# Patient Record
Sex: FEMALE | Race: WHITE | NOT HISPANIC OR LATINO | Employment: PART TIME | URBAN - METROPOLITAN AREA
[De-identification: names, ages, dates, MRNs, and addresses within clinical notes are randomized per-mention and may not be internally consistent; named-entity substitution may affect disease eponyms.]

---

## 2017-11-20 ENCOUNTER — TRANSFERRED RECORDS (OUTPATIENT)
Dept: HEALTH INFORMATION MANAGEMENT | Facility: CLINIC | Age: 67
End: 2017-11-20

## 2017-11-29 ASSESSMENT — ENCOUNTER SYMPTOMS
EYE PAIN: 0
LEG PAIN: 0
ORTHOPNEA: 0
EYE REDNESS: 0
SYNCOPE: 0
POOR WOUND HEALING: 0
EYE WATERING: 0
EYE IRRITATION: 0
EXERCISE INTOLERANCE: 0
NAIL CHANGES: 0
PALPITATIONS: 0
SKIN CHANGES: 0
LIGHT-HEADEDNESS: 1
HYPOTENSION: 0
SLEEP DISTURBANCES DUE TO BREATHING: 0
DOUBLE VISION: 0
HYPERTENSION: 0

## 2017-12-01 ENCOUNTER — TRANSFERRED RECORDS (OUTPATIENT)
Dept: HEALTH INFORMATION MANAGEMENT | Facility: CLINIC | Age: 67
End: 2017-12-01

## 2017-12-08 NOTE — TELEPHONE ENCOUNTER
APPT INFO    Date /Time: 12/12/17, 10:30am    Reason for Appt: St[place]    Ref Provider/Clinic: [facility]INC[facility]   Are there internal records? If yes, list:    Patient Contact (Y/N) & Call Details: No, referred    Action: Faxed cover sheet     OUTSIDE RECORDS CHECKLIST     CLINIC NAME COMMENTS REC (x) IMG (x)   [facility]   x n/a   [facility] Neurological [facility]  x x

## 2017-12-11 NOTE — TELEPHONE ENCOUNTER
Records Received From: Mahamed     Date/Exam/Location  (specify location if different)   Office Notes: Karla notes: 11/20/17   Radiology Reports: - MRA head 12/1/17- Karla   - MRA neck 12/1/17- Karla   - MR brain 12/1/17- Karla

## 2017-12-11 NOTE — TELEPHONE ENCOUNTER
Phone Call:    Who did you talk to? (or) Who did you call? Karla Hicks    Call Detail/Action: Karla- will have disc ready to be  today  Kurt- set  for disc

## 2017-12-12 ENCOUNTER — PRE VISIT (OUTPATIENT)
Dept: NEUROLOGY | Facility: CLINIC | Age: 67
End: 2017-12-12

## 2017-12-12 ENCOUNTER — OFFICE VISIT (OUTPATIENT)
Dept: NEUROLOGY | Facility: CLINIC | Age: 67
End: 2017-12-12

## 2017-12-12 VITALS
SYSTOLIC BLOOD PRESSURE: 145 MMHG | BODY MASS INDEX: 20.23 KG/M2 | OXYGEN SATURATION: 100 % | DIASTOLIC BLOOD PRESSURE: 77 MMHG | HEART RATE: 82 BPM | RESPIRATION RATE: 20 BRPM | WEIGHT: 128.9 LBS | TEMPERATURE: 97.5 F | HEIGHT: 67 IN

## 2017-12-12 DIAGNOSIS — G45.3 AMAUROSIS FUGAX OF LEFT EYE: Primary | ICD-10-CM

## 2017-12-12 DIAGNOSIS — Q28.3 CONGENITAL ANOMALY OF CEREBROVASCULAR SYSTEM: ICD-10-CM

## 2017-12-12 RX ORDER — VALACYCLOVIR HYDROCHLORIDE 1 G/1
2 TABLET, FILM COATED ORAL 2 TIMES DAILY PRN
Refills: 2 | COMMUNITY
Start: 2017-05-29 | End: 2020-09-24

## 2017-12-12 RX ORDER — ALBUTEROL SULFATE 90 UG/1
2 AEROSOL, METERED RESPIRATORY (INHALATION) PRN
COMMUNITY
Start: 2016-09-01 | End: 2019-03-14

## 2017-12-12 RX ORDER — ATORVASTATIN CALCIUM 10 MG/1
10 TABLET, FILM COATED ORAL DAILY
Qty: 30 TABLET | Refills: 2 | Status: SHIPPED | OUTPATIENT
Start: 2017-12-12 | End: 2019-03-14

## 2017-12-12 ASSESSMENT — PAIN SCALES - GENERAL: PAINLEVEL: MILD PAIN (3)

## 2017-12-12 NOTE — LETTER
"12/12/2017       RE: Sarah العلي  [address]  [place] [address]     Dear Colleague,    Thank you for referring your patient, Sarah العلي, to the [facility] NEUROLOGY at [facility]. Please see a copy of my visit note below.    STROKE CLINIC NOTE     Sarah العلي is a 67 year old female referred to our clinic from Brittaney Tompkins. She is a retired pediatric RN.    HPI     This is a 67 year old right handed woman with history of recent left eye peripheral vision loss and incidental finding of Right ICA aneurysm.    She has a history of Migraine with aura for 17 years.  Uncharacteristically, her Migraines started after her menopause. She went to an ophthalmologist who diagnosed her with migraine with aura. She has since had migraine attacks intermittently over the years. She reports association with strobe lights or bright lights etc. Her auras consist of fortification spectrum, scintillating scotomas and other visual phenomenon. They usually start in peripheral part of the eyes and last for 10 -15 minutes. Tylenol and Ibuprofen help her headaches; and she only seldom needs them. She has been having Aura without migraine since the last few years.    Most recently about 8 weeks back, she was in a movie theater and she started having an episode of vision loss in the temporal aspect of her left eye field. She describes it as a \" curtain was drawn across\". This episode lasted for 2- 10 minutes.     She visited an ophthalmologist who told her that her exam was fine. He gave her the diagnosis of ocular migraine and discussed with her to go to ER if it lasts more than 30 minutes.    Her PCP got MRI Brain and vascular imaging which showed a 5 mm anterior genu carotid siphon aneurysm and she was then referred to our clinic.    Her biggest question is : what are the changes of aneurysm rupture, growth and what are the changes that this aneurysm is a cause of her " visual aura episode.    Stroke risk factors     Migraine with aura.  Family history of stroke.  Right ICA Aneurysm.  HLD  Ex smoker - quit in 1983, smoked for 15 years, 1/2 ppd.  HTN  No history of kidney cysts or kidney diseases- has microhematuria.  First cousin had a fatal aneurysm rupture.    PMH     Past Medical History:   Diagnosis Date     GERD (gastroesophageal reflux disease)      Lichen planus      Osteopenia 2010       Past Surgical History:   Procedure Laterality Date     DILATION AND CURETTAGE       LAPAROSCOPY       TONSILLECTOMY & ADENOIDECTOMY         Medications: I have reviewed this patient's current medications.  Current Outpatient Prescriptions   Medication     Cholecalciferol (VITAMIN D3) 1000 UNITS CAPS     albuterol (PROAIR HFA/PROVENTIL HFA/VENTOLIN HFA) 108 (90 BASE) MCG/ACT Inhaler     valACYclovir (VALTREX) 1000 mg tablet     aspirin 81 MG tablet     atorvastatin (LIPITOR) 10 MG tablet     fluocinonide (LIDEX) 0.05 % gel     [DISCONTINUED] albuterol (PROAIR HFA, PROVENTIL HFA, VENTOLIN HFA) 108 (90 BASE) MCG/ACT inhaler     No current facility-administered medications for this visit.        Allergies   Allergen Reactions     Nickel      Other reaction(s): *Unknown       Family History: Father had a stroke at 85 years of age.    Social History: I have reviewed this patient's social history. Quit smoking.        ROS       The 10 point Review of Systems is negative other than noted in the HPI or here.   Answers for HPI/ROS submitted by the patient on 11/29/2017   General Symptoms: No  Skin Symptoms: Yes  HENT Symptoms: No  EYE SYMPTOMS: Yes  HEART SYMPTOMS: Yes  LUNG SYMPTOMS: No  INTESTINAL SYMPTOMS: No  URINARY SYMPTOMS: No  GYNECOLOGIC SYMPTOMS: No  BREAST SYMPTOMS: No  SKELETAL SYMPTOMS: No  BLOOD SYMPTOMS: No  NERVOUS SYSTEM SYMPTOMS: No  MENTAL HEALTH SYMPTOMS: No  Changes in hair: No  Changes in moles/birth marks: No  Itching: No  Rashes: No  Changes in nails: No  Acne: No  Hair in  "places you don't want it: No  Change in facial hair: No  Warts: No  Non-healing sores: No  Scarring: No  Flaking of skin: No  Color changes of hands/feet in cold : Yes  Sun sensitivity: No  Skin thickening: No  Eye pain: No  Vision loss: Yes  Dry eyes: No  Watery eyes: No  Eye bulging: No  Double vision: No  Flashing of lights: Yes  Spots: No  Floaters: No  Redness: No  Crossed eyes: No  Tunnel Vision: No  Yellowing of eyes: No  Eye irritation: No  Chest pain or pressure: No  Fast or irregular heartbeat: No  Pain in legs with walking: No  Trouble breathing while lying down: No  Fingers or toes appear blue: Yes  High blood pressure: No  Low blood pressure: No  Fainting: No  Murmurs: No  Pacemaker: No  Varicose veins: No  Edema or swelling: No  Wake up at night with shortness of breath: No  Light-headedness: Yes  Exercise intolerance: No      Objective       VITALS  /77  Pulse 82  Temp 97.5  F (36.4  C)  Resp 20  Ht 1.697 m (5' 6.8\")  Wt 58.5 kg (128 lb 14.4 oz)  SpO2 100%  Breastfeeding? No  BMI 20.31 kg/m2    PHYSICAL EXAMINATION    General:  patient lying in bed without any acute distress    HEENT:  normocephalic/atraumatic  Cardio:  RRR  Pulmonary:  no respiratory distress  Abdomen:  soft, non-tender, non-distended  Extremities:  no edema, peripheral pulses palpable  Skin:  intact, warm/dry     Neurologic  Mental Status:  fully alert, attentive and oriented, follows commands, speech clear and fluent  Cranial Nerves:  visual fields intact, PERRL, EOMI with normal smooth pursuit, facial sensation intact and symmetric, facial movements symmetric, hearing not formally tested but intact to conversation, palate elevation symmetric and uvula midline, no dysarthria, shoulder shrug strong bilaterally, tongue protrusion midline  Motor:  no abnormal movements, normal tone throughout, normal muscle bulk, no pronator drift  Reflexes:  2+ and symmetric throughout  Sensory:  intact/symmetric to light touch and pin " "prick throughout upper and lower extremities  Coordination:  FNF and HS intact without dysmetria  Station/Gait:  normal width, stride length, turn, and arm swing     LABS/IMAGING and other ancillary data     Recent Labs   Lab Test  08/10/15   0906  07/22/13   0828   CHOL  216*  232*   HDL  83  78   LDL  109  117   TRIG  118  181*   CHOLHDLRATIO  2.6  3.0     No results found for: A1C    No results found for this or any previous visit (from the past 4320 hour(s)).    MRI Brain and MRA reviewed.    ASSESSMENT     # Right Carotid artery aneurysm- Carotid cave region- anteromedial aspect.  # Episode of vision loss: negative scotoma/migraine aura vs amaurosis fugax( less likely)    PLAN     Will get ECHO.  Continue ASA indefinitely and Statin for 3 months.  Home blood pressure checks.  Discussed with neuro IR, will get a CTA( will help us determine if the carotid cave aneurysm is intradural vs extradural).  Does not need to follow up with us. She will call us after her ECHO and CTA are done, so that we can go over the results.  Discussed about the risks of rupture and growth for anterior circulation aneurysms.  F/u in neuro IR clinic in 1 year.      Alfred VILLAFUERTE  PGY-5, Vascular Neurology Fellow  10:39 AM December 12, 2017      VASCULAR NEUROLOGY ATTENDING ATTESTATION    I saw the patient December 12, 2017 with the stroke fellow.  I reviewed all laboratory studies and neuroimaging.  I discussed the plan with the fellow and agree with their note.    In summary, this is a 67 year old female with migraine aura who developed transient monocular vision loss OS with follow-up imaging revealing a medially and posteriorly projecting 4.5 mm aneurysm at the genu of the R carotid siphon.  Upon clarification, the visual deficit was felt to be monocular and gradual as \"if a shade came across her vision\" but was vertical rather than the classic horizontal visual field cut.      Follow-up CTA was unchanged from her prior MRA.   " "PAULIE unremarkable.  .    Impression:  1. atypical amaurosis fugax vs. \"retinal migraine\" or atypical migraine aura -- unable to clarify definitively by history  2. Incidental finding of R. ICA aneurysm  3. Unremarkable interim stroke evaluation    Plan:   1. Aspirin 81mg daily  2. Atorvastatin 10mg for 3 months, continue indefinitely if no side effects  3. Risk of rupture of a <7mm anuerysm in the anterior circulation without prior SAH is low.  Follow-up with Dr. Brandt in one year with repeat MRA.  4. Record blood pressures twice a day      Bimal Cortes MD, MS  Vascular Neurology    "

## 2017-12-12 NOTE — PATIENT INSTRUCTIONS
Investigation for possible amaurosis fugax:    1. Echocardiogram  2. Repeat imaging of your blood vessels    Risk factors for aneurysm growth/stroke  1. Hypertension: please check BP twice a day and record them.  Goal < 140/90, but even tighter control is now advocated    For stroke prevention:  1. Continue aspirin 81mg indefinitely  2. Low dose Atorvastatin for 3 months, then could stop.

## 2017-12-12 NOTE — TELEPHONE ENCOUNTER
Received Imaging From: Karla     Image Type (x): Disc:_x____  Pacs:_____      Exam Date/Name: MR head 12/1/17  MR angio head 12/1/17  MR angio 12/1/17 Comments:

## 2017-12-12 NOTE — PROGRESS NOTES
"STROKE CLINIC NOTE     Sarah العلي is a 67 year old female referred to our clinic from Brittaney Tompkins. She is a retired pediatric RN.    HPI     This is a 67 year old right handed woman with history of recent left eye peripheral vision loss and incidental finding of Right ICA aneurysm.    She has a history of Migraine with aura for 17 years.  Uncharacteristically, her Migraines started after her menopause. She went to an ophthalmologist who diagnosed her with migraine with aura. She has since had migraine attacks intermittently over the years. She reports association with strobe lights or bright lights etc. Her auras consist of fortification spectrum, scintillating scotomas and other visual phenomenon. They usually start in peripheral part of the eyes and last for 10 -15 minutes. Tylenol and Ibuprofen help her headaches; and she only seldom needs them. She has been having Aura without migraine since the last few years.    Most recently about 8 weeks back, she was in a movie theater and she started having an episode of vision loss in the temporal aspect of her left eye field. She describes it as a \" curtain was drawn across\". This episode lasted for 2- 10 minutes.     She visited an ophthalmologist who told her that her exam was fine. He gave her the diagnosis of ocular migraine and discussed with her to go to ER if it lasts more than 30 minutes.    Her PCP got MRI Brain and vascular imaging which showed a 5 mm anterior genu carotid siphon aneurysm and she was then referred to our clinic.    Her biggest question is : what are the changes of aneurysm rupture, growth and what are the changes that this aneurysm is a cause of her visual aura episode.    Stroke risk factors     Migraine with aura.  Family history of stroke.  Right ICA Aneurysm.  HLD  Ex smoker - quit in 1983, smoked for 15 years, 1/2 ppd.  HTN  No history of kidney cysts or kidney diseases- has microhematuria.  First cousin had a fatal aneurysm " rupture.    PMH     Past Medical History:   Diagnosis Date     GERD (gastroesophageal reflux disease)      Lichen planus      Osteopenia 2010       Past Surgical History:   Procedure Laterality Date     DILATION AND CURETTAGE       LAPAROSCOPY       TONSILLECTOMY & ADENOIDECTOMY         Medications: I have reviewed this patient's current medications.  Current Outpatient Prescriptions   Medication     Cholecalciferol (VITAMIN D3) 1000 UNITS CAPS     albuterol (PROAIR HFA/PROVENTIL HFA/VENTOLIN HFA) 108 (90 BASE) MCG/ACT Inhaler     valACYclovir (VALTREX) 1000 mg tablet     aspirin 81 MG tablet     atorvastatin (LIPITOR) 10 MG tablet     fluocinonide (LIDEX) 0.05 % gel     [DISCONTINUED] albuterol (PROAIR HFA, PROVENTIL HFA, VENTOLIN HFA) 108 (90 BASE) MCG/ACT inhaler     No current facility-administered medications for this visit.        Allergies   Allergen Reactions     Nickel      Other reaction(s): *Unknown       Family History: Father had a stroke at 85 years of age.    Social History: I have reviewed this patient's social history. Quit smoking.        ROS       The 10 point Review of Systems is negative other than noted in the HPI or here.   Answers for HPI/ROS submitted by the patient on 11/29/2017   General Symptoms: No  Skin Symptoms: Yes  HENT Symptoms: No  EYE SYMPTOMS: Yes  HEART SYMPTOMS: Yes  LUNG SYMPTOMS: No  INTESTINAL SYMPTOMS: No  URINARY SYMPTOMS: No  GYNECOLOGIC SYMPTOMS: No  BREAST SYMPTOMS: No  SKELETAL SYMPTOMS: No  BLOOD SYMPTOMS: No  NERVOUS SYSTEM SYMPTOMS: No  MENTAL HEALTH SYMPTOMS: No  Changes in hair: No  Changes in moles/birth marks: No  Itching: No  Rashes: No  Changes in nails: No  Acne: No  Hair in places you don't want it: No  Change in facial hair: No  Warts: No  Non-healing sores: No  Scarring: No  Flaking of skin: No  Color changes of hands/feet in cold : Yes  Sun sensitivity: No  Skin thickening: No  Eye pain: No  Vision loss: Yes  Dry eyes: No  Watery eyes: No  Eye bulging:  "No  Double vision: No  Flashing of lights: Yes  Spots: No  Floaters: No  Redness: No  Crossed eyes: No  Tunnel Vision: No  Yellowing of eyes: No  Eye irritation: No  Chest pain or pressure: No  Fast or irregular heartbeat: No  Pain in legs with walking: No  Trouble breathing while lying down: No  Fingers or toes appear blue: Yes  High blood pressure: No  Low blood pressure: No  Fainting: No  Murmurs: No  Pacemaker: No  Varicose veins: No  Edema or swelling: No  Wake up at night with shortness of breath: No  Light-headedness: Yes  Exercise intolerance: No      Objective       VITALS  /77  Pulse 82  Temp 97.5  F (36.4  C)  Resp 20  Ht 1.697 m (5' 6.8\")  Wt 58.5 kg (128 lb 14.4 oz)  SpO2 100%  Breastfeeding? No  BMI 20.31 kg/m2    PHYSICAL EXAMINATION    General:  patient lying in bed without any acute distress    HEENT:  normocephalic/atraumatic  Cardio:  RRR  Pulmonary:  no respiratory distress  Abdomen:  soft, non-tender, non-distended  Extremities:  no edema, peripheral pulses palpable  Skin:  intact, warm/dry     Neurologic  Mental Status:  fully alert, attentive and oriented, follows commands, speech clear and fluent  Cranial Nerves:  visual fields intact, PERRL, EOMI with normal smooth pursuit, facial sensation intact and symmetric, facial movements symmetric, hearing not formally tested but intact to conversation, palate elevation symmetric and uvula midline, no dysarthria, shoulder shrug strong bilaterally, tongue protrusion midline  Motor:  no abnormal movements, normal tone throughout, normal muscle bulk, no pronator drift  Reflexes:  2+ and symmetric throughout  Sensory:  intact/symmetric to light touch and pin prick throughout upper and lower extremities  Coordination:  FNF and HS intact without dysmetria  Station/Gait:  normal width, stride length, turn, and arm swing     LABS/IMAGING and other ancillary data     Recent Labs   Lab Test  08/10/15   0906  07/22/13   0828   CHOL  216*  232* " "  HDL  83  78   LDL  109  117   TRIG  118  181*   CHOLHDLRATIO  2.6  3.0     No results found for: A1C    No results found for this or any previous visit (from the past 4320 hour(s)).    MRI Brain and MRA reviewed.    ASSESSMENT     # Right Carotid artery aneurysm- Carotid cave region- anteromedial aspect.  # Episode of vision loss: negative scotoma/migraine aura vs amaurosis fugax( less likely)    PLAN     Will get ECHO.  Continue ASA indefinitely and Statin for 3 months.  Home blood pressure checks.  Discussed with neuro IR, will get a CTA( will help us determine if the carotid cave aneurysm is intradural vs extradural).  Does not need to follow up with us. She will call us after her ECHO and CTA are done, so that we can go over the results.  Discussed about the risks of rupture and growth for anterior circulation aneurysms.  F/u in neuro IR clinic in 1 year.      Alfred VILLAFUERTE  PGY-5, Vascular Neurology Fellow  10:39 AM December 12, 2017      VASCULAR NEUROLOGY ATTENDING ATTESTATION    I saw the patient December 12, 2017 with the stroke fellow.  I reviewed all laboratory studies and neuroimaging.  I discussed the plan with the fellow and agree with their note.    In summary, this is a 67 year old female with migraine aura who developed transient monocular vision loss OS with follow-up imaging revealing a medially and posteriorly projecting 4.5 mm aneurysm at the genu of the R carotid siphon.  Upon clarification, the visual deficit was felt to be monocular and gradual as \"if a shade came across her vision\" but was vertical rather than the classic horizontal visual field cut.      Follow-up CTA was unchanged from her prior MRA.   PAULIE unremarkable.  .    Impression:  1. atypical amaurosis fugax vs. \"retinal migraine\" or atypical migraine aura -- unable to clarify definitively by history  2. Incidental finding of R. ICA aneurysm  3. Unremarkable interim stroke evaluation    Plan:   1. Aspirin 81mg " daily  2. Atorvastatin 10mg for 3 months, continue indefinitely if no side effects  3. Risk of rupture of a <7mm anuerysm in the anterior circulation without prior SAH is low.  Follow-up with Dr. Brandt in one year with repeat MRA.  4. Record blood pressures twice a day      Bimal Cortes MD, MS  Vascular Neurology

## 2017-12-12 NOTE — MR AVS SNAPSHOT
After Visit Summary   12/12/2017    Sarah العلي    MRN: [id number]           Patient Information     Date Of Birth          1950        Visit Information        Provider Department      12/12/2017 10:30 AM Bimal Cortes MD [facility] Neurology        Today's Diagnoses     Amaurosis fugax of left eye    -  1    Congenital anomaly of cerebrovascular system          Care Instructions    Investigation for possible amaurosis fugax:    1. Echocardiogram  2. Repeat imaging of your blood vessels    Risk factors for aneurysm growth/stroke  1. Hypertension: please check BP twice a day and record them.  Goal < 140/90, but even tighter control is now advocated    For stroke prevention:  1. Continue aspirin 81mg indefinitely  2. Low dose Atorvastatin for 3 months, then could stop.          Follow-ups after your visit        Your next 10 appointments already scheduled     Dec 11, 2018  3:10 PM CST   (Arrive by 2:55 PM)   New Stroke with Tyler Brandt MD   [facility] ([facility])    [address] [place] [id number] [phone]              Who to contact     Please call your clinic at [phone] to:    Ask questions about your health    Make or cancel appointments    Discuss your medicines    Learn about your test results    Speak to your doctor   If you have compliments or concerns about an experience at your clinic, or if you wish to file a complaint, please contact [facility] Patient Relations at [phone] or email us at [email or web address]         Additional Information About Your Visit        MyChart Information     [employer] gives you secure access to your electronic health record. If you see a primary care provider, you can also send messages to your care team and make appointments. If you have questions, please call your primary care clinic.  If you do not have a primary  "care provider, please call [phone] and they will assist you.      [employer] is an electronic gateway that provides easy, online access to your medical records. With [employer], you can request a clinic appointment, read your test results, renew a prescription or communicate with your care team.     To access your existing account, please contact your [facility] or call [phone] for assistance.        Care EveryWhere ID     This is your Care EveryWhere ID. This could be used by other organizations to access your [facility] medical records  [id number]        Your Vitals Were     Pulse Temperature Respirations Height Pulse Oximetry Breastfeeding?    82 97.5  F (36.4  C) 20 1.697 m (5' 6.8\") 100% No    BMI (Body Mass Index)                   20.31 kg/m2            Blood Pressure from Last 3 Encounters:   12/12/17 145/77   08/04/15 123/71   04/21/15 130/69    Weight from Last 3 Encounters:   12/12/17 58.5 kg (128 lb 14.4 oz)   08/04/15 62.3 kg (137 lb 6.4 oz)   04/21/15 60.8 kg (134 lb)                 Today's Medication Changes          These changes are accurate as of: 12/12/17 11:59 PM.  If you have any questions, ask your nurse or doctor.               Start taking these medicines.        Dose/Directions    atorvastatin 10 MG tablet   Commonly known as:  LIPITOR   Used for:  Amaurosis fugax of left eye   Started by:  Bimal Cortes MD        Dose:  10 mg   Take 1 tablet (10 mg) by mouth daily   Quantity:  30 tablet   Refills:  2            Where to get your medicines      These medications were sent to [facility] [address] [place], [place] - [address] [facility]  [address] [facility] [address]    Hours:  Added 10/26 CK Checked with pharmacy Phone:  [phone]     atorvastatin 10 MG tablet                Primary Care Provider Office Phone # Fax #    Brittaney Tompkins [phone]       [facility] [address]  [place] " [id number]        Equal Access to Services     [facility]: Hadii francy cardoso sarinarenate Judyali, wacarolynemilee woodcarmelinaha, qacarlos eliasnatachaanny balderas. So [facility] [phone].    ATENCIÓN: Si habla español, tiene a quispe disposición servicios gratuitos de asistencia lingüística. Emyame al [phone].    We comply with applicable federal civil rights laws and [place] laws. We do not discriminate on the basis of race, color, national origin, age, disability, sex, sexual orientation, or gender identity.            Thank you!     Thank you for choosing [facility] NEUROLOGY  for your care. Our goal is always to provide you with excellent care. Hearing back from our patients is one way we can continue to improve our services. Please take a few minutes to complete the written survey that you may receive in the mail after your visit with us. Thank you!             Your Updated Medication List - Protect others around you: Learn how to safely use, store and throw away your medicines at www.disposemymeds.org.          This list is accurate as of: 12/12/17 11:59 PM.  Always use your most recent med list.                   Brand Name Dispense Instructions for use Diagnosis    albuterol 108 (90 BASE) MCG/ACT Inhaler    PROAIR HFA/PROVENTIL HFA/VENTOLIN HFA     Inhale 2 puffs into the lungs as needed        aspirin 81 MG tablet      Take 81 mg by mouth daily        atorvastatin 10 MG tablet    LIPITOR    30 tablet    Take 1 tablet (10 mg) by mouth daily    Amaurosis fugax of left eye       fluocinonide 0.05 % topical gel    LIDEX     Apply topically 2 times daily        valACYclovir 1000 mg tablet    VALTREX     Take 2 tablets by mouth 2 times daily as needed        vitamin D3 1000 UNITS Caps      Take 1 capsule by mouth daily

## 2017-12-12 NOTE — NURSING NOTE
Chief Complaint   Patient presents with     Consult     UMP- AMAUROSIS FUGAX     Howard Domínguez, CMA

## 2017-12-15 ENCOUNTER — RADIANT APPOINTMENT (OUTPATIENT)
Dept: CARDIOLOGY | Facility: CLINIC | Age: 67
End: 2017-12-15
Payer: COMMERCIAL

## 2017-12-15 ENCOUNTER — RADIANT APPOINTMENT (OUTPATIENT)
Dept: CT IMAGING | Facility: CLINIC | Age: 67
End: 2017-12-15
Payer: COMMERCIAL

## 2017-12-15 DIAGNOSIS — G45.3 AMAUROSIS FUGAX OF LEFT EYE: ICD-10-CM

## 2017-12-15 DIAGNOSIS — Q28.3 CONGENITAL ANOMALY OF CEREBROVASCULAR SYSTEM: ICD-10-CM

## 2017-12-15 LAB
CREAT BLD-MCNC: 0.8 MG/DL (ref 0.52–1.04)
GFR SERPL CREATININE-BSD FRML MDRD: 72 ML/MIN/1.7M2

## 2017-12-15 RX ORDER — IOPAMIDOL 755 MG/ML
75 INJECTION, SOLUTION INTRAVASCULAR ONCE
Status: COMPLETED | OUTPATIENT
Start: 2017-12-15 | End: 2017-12-15

## 2017-12-15 RX ADMIN — Medication 6 ML: at 09:21

## 2017-12-15 RX ADMIN — IOPAMIDOL 75 ML: 755 INJECTION, SOLUTION INTRAVASCULAR at 07:49

## 2017-12-15 NOTE — DISCHARGE INSTRUCTIONS

## 2017-12-18 ENCOUNTER — TELEPHONE (OUTPATIENT)
Dept: NEUROLOGY | Facility: CLINIC | Age: 67
End: 2017-12-18

## 2017-12-18 NOTE — TELEPHONE ENCOUNTER
Message sent to Dr. Garzon and Dr. Cortes to review results. Patient informed and will await call back from stroke team.

## 2017-12-18 NOTE — TELEPHONE ENCOUNTER
----- Message from Judi Carroll RN sent at 12/18/2017 10:42 AM CST -----  Regarding: FW: Pt calling asking for a call back from [facility] about MRI/CT/Echo Results  Contact: [phone]      ----- Message -----     From: Radha Aguirre     Sent: 12/18/2017  10:35 AM       To: [facility]-Neurosci--Adult-Csc  Subject: Pt calling asking for a call back from [facility] #    Pt calling asking for a call back from [facility] about about MRI/CT/Echo Results. Pt can be reached at [phone]    Thank You,  Radha    Please DO NOT send this message and/or reply back to sender.  Call Center Representatives DO NOT respond to messages.

## 2017-12-21 NOTE — TELEPHONE ENCOUNTER
FW:  Pt calling asking for a call back today about medical question  Received: Today       Perlita Gandhi, RN  Angelita Godfrey RN       Phone Number: [phone]                       Previous Messages       ----- Message -----      From: Radha Aguirre      Sent: 12/21/2017   8:15 AM        To: [facility]-Neurosci--Adult-Csc   Subject:  Pt calling asking for a call b*     Pt calling waiting for CTA/MRI/Echo Results and is going out of town tomorrow and needs some medical questions answered before she travels. Pt mentioned how important it is that she gets a call back today.   Pt can be reached at [phone]     Thank You,   Radha          Patient states she driving to [place] tomorrow. Patient is wondering about the 90 day risk and wonders if it is safe to travel. She will not be driving. May inform her via My Chart. Message sent to Dr. Cortes and Dr. Garzon.

## 2017-12-26 ENCOUNTER — TELEPHONE (OUTPATIENT)
Dept: NEUROLOGY | Facility: CLINIC | Age: 67
End: 2017-12-26

## 2017-12-26 NOTE — TELEPHONE ENCOUNTER
Spoke with patient regarding results of work-up.  Tests results are reassuring, no changes in management.    Bimal Cortes MD, MS  Vascular Neurology

## 2018-04-12 ENCOUNTER — TRANSFERRED RECORDS (OUTPATIENT)
Dept: HEALTH INFORMATION MANAGEMENT | Facility: CLINIC | Age: 68
End: 2018-04-12

## 2018-12-06 ENCOUNTER — OFFICE VISIT (OUTPATIENT)
Dept: FAMILY MEDICINE | Facility: CLINIC | Age: 68
End: 2018-12-06
Payer: COMMERCIAL

## 2018-12-06 VITALS
SYSTOLIC BLOOD PRESSURE: 130 MMHG | WEIGHT: 129.2 LBS | RESPIRATION RATE: 16 BRPM | BODY MASS INDEX: 20.36 KG/M2 | DIASTOLIC BLOOD PRESSURE: 82 MMHG | OXYGEN SATURATION: 100 % | HEART RATE: 73 BPM | TEMPERATURE: 97.5 F

## 2018-12-06 DIAGNOSIS — H91.93 BILATERAL HEARING LOSS, UNSPECIFIED HEARING LOSS TYPE: ICD-10-CM

## 2018-12-06 DIAGNOSIS — Z76.89 ENCOUNTER TO ESTABLISH CARE: Primary | ICD-10-CM

## 2018-12-06 DIAGNOSIS — R03.0 WHITE COAT SYNDROME WITHOUT DIAGNOSIS OF HYPERTENSION: ICD-10-CM

## 2018-12-06 DIAGNOSIS — I67.1 RIGHT INTERNAL CAROTID ARTERY ANEURYSM: ICD-10-CM

## 2018-12-06 DIAGNOSIS — M85.80 OSTEOPENIA, UNSPECIFIED LOCATION: ICD-10-CM

## 2018-12-06 NOTE — PATIENT INSTRUCTIONS
Here is the plan from today's visit    Encounter to establish care    White coat syndrome without diagnosis of hypertension  - please check BP at home periodically before next visit and bring BP cuff to calibrate    Bilateral hearing loss, unspecified hearing loss type  - AUDIOLOGY ADULT REFERRAL - INTERNAL - Hearing aid consultation    Right internal carotid artery aneurysm  Ocular migraine with aura  - established with neurology and neuroophthalmogy. Will review all records    Osteopenia, unspecified location  - DX Hip/Pelvis/Spine (DEXA); Future      Please call or return to clinic if your symptoms don't go away.    Follow up plan  1-2 months for recheck  Can choose to do complete annual Medicare Wellness exam  Bring BP cuff next visit  Thank you for coming to [facility] today.  Lab Testing:  **If you had lab testing today and your results are reassuring or normal they will be mailed to you or sent through [employer] within 7 days.   **If the lab tests need quick action we will call you with the results.  The phone number we will call with results is # [phone] (home) none (work). If this is not the best number please call our clinic and change the number.  Medication Refills:  If you need any refills please call your pharmacy and they will contact us.   If you need to  your refill at a new pharmacy, please contact the new pharmacy directly. The new pharmacy will help you get your medications transferred faster.   Scheduling:  If you have any concerns about today's visit or wish to schedule another appointment please call our office during normal business hours [phone] (8-5:00 M-F)  If a referral was made to a [facility] and you don't get a call from central scheduling please call [phone].  If a Mammogram was ordered for you at [facility] call [phone] to schedule or change your appointment.  If you had an XRay/CT/Ultrasound/MRI ordered the number is  [phone] to schedule or change your radiology appointment.   Medical Concerns:  If you have urgent medical concerns please call [phone] at any time of the day.    Alyson Unger MD    Audiology referral  [phone]  Hi there, pt is scheduled on 01/17           Jacye

## 2018-12-06 NOTE — MR AVS SNAPSHOT
After Visit Summary   12/6/2018    Sarah العلي    MRN: [id number]           Patient Information     Date Of Birth          1950        Visit Information        Provider Department      12/6/2018 2:00 PM Alyson Unger MD [facility]        Today's Diagnoses     Encounter to establish care    -  1    White coat syndrome without diagnosis of hypertension        Bilateral hearing loss, unspecified hearing loss type        Right internal carotid artery aneurysm        Osteopenia, unspecified location          Care Instructions    Here is the plan from today's visit    Encounter to establish care    White coat syndrome without diagnosis of hypertension  - please check BP at home periodically before next visit and bring BP cuff to calibrate    Bilateral hearing loss, unspecified hearing loss type  - AUDIOLOGY ADULT REFERRAL - INTERNAL - Hearing aid consultation    Right internal carotid artery aneurysm  Ocular migraine with aura  - established with neurology and neuroophthalmogy. Will review all records    Osteopenia, unspecified location  - DX Hip/Pelvis/Spine (DEXA); Future      Please call or return to clinic if your symptoms don't go away.    Follow up plan  1-2 months for recheck  Can choose to do complete annual Medicare Wellness exam  Bring BP cuff next visit  Thank you for coming to [facility] today.  Lab Testing:  **If you had lab testing today and your results are reassuring or normal they will be mailed to you or sent through [employer] within 7 days.   **If the lab tests need quick action we will call you with the results.  The phone number we will call with results is # [phone] (home) none (work). If this is not the best number please call our clinic and change the number.  Medication Refills:  If you need any refills please call your pharmacy and they will contact us.   If you need to  your refill at a new pharmacy, please contact the new pharmacy  directly. The new pharmacy will help you get your medications transferred faster.   Scheduling:  If you have any concerns about today's visit or wish to schedule another appointment please call our office during normal business hours [phone] (8-5:00 M-F)  If a referral was made to a [facility] and you don't get a call from central scheduling please call [phone].  If a Mammogram was ordered for you at [facility] call [phone] to schedule or change your appointment.  If you had an XRay/CT/Ultrasound/MRI ordered the number is [phone] to schedule or change your radiology appointment.   Medical Concerns:  If you have urgent medical concerns please call [phone] at any time of the day.    Alyson Unger MD          Follow-ups after your visit        Additional Services     AUDIOLOGY ADULT REFERRAL - INTERNAL       Your provider has referred you to: ealth: [facility] [place] [phone]   [email or web address]    Specialty Testing:  Hearing Aid Consultation                  Future tests that were ordered for you today     Open Future Orders        Priority Expected Expires Ordered    DX Hip/Pelvis/Spine (DEXA) Routine  12/6/2019 12/6/2018            Who to contact     Please call your clinic at [phone] to:    Ask questions about your health    Make or cancel appointments    Discuss your medicines    Learn about your test results    Speak to your doctor            Additional Information About Your Visit        [employer] Information     [employer] gives you secure access to your electronic health record. If you see a primary care provider, you can also send messages to your care team and make appointments. If you have questions, please call your primary care clinic.  If you do not have a primary care provider, please call [phone] and they will assist you.      [employer] is  an electronic gateway that provides easy, online access to your medical records. With [employer], you can request a clinic appointment, read your test results, renew a prescription or communicate with your care team.     To access your existing account, please contact your [facility] or call [phone] for assistance.        Care EveryWhere ID     This is your Care EveryWhere ID. This could be used by other organizations to access your [facility] medical records  [id number]        Your Vitals Were     Pulse Temperature Respirations Pulse Oximetry BMI (Body Mass Index)       73 97.5  F (36.4  C) (Oral) 16 100% 20.36 kg/m2        Blood Pressure from Last 3 Encounters:   12/06/18 130/82   12/12/17 145/77   08/04/15 123/71    Weight from Last 3 Encounters:   12/06/18 129 lb 3.2 oz (58.6 kg)   12/12/17 128 lb 14.4 oz (58.5 kg)   08/04/15 137 lb 6.4 oz (62.3 kg)              We Performed the Following     AUDIOLOGY ADULT REFERRAL - INTERNAL        Primary Care Provider Office Phone # Fax #    Brittaney MILLER Abhinavkory [phone]       [facility] [address] [address]        Equal Access to Services     ALIVIA BEAVER : Hadii aad ku hadasho Soomaali, waaxda luqadaha, qaybta kaalmada adeegyada, waxay idiin hayaan alli romano larosalva . So [facility] [phone].    ATENCIÓN: Si habla español, tiene a quispe disposición servicios gratuitos de asistencia lingüística. Llame al [phone].    We comply with applicable federal civil rights laws and [place] laws. We do not discriminate on the basis of race, color, national origin, age, disability, sex, sexual orientation, or gender identity.            Thank you!     Thank you for choosing [facility]  for your care. Our goal is always to provide you with excellent care. Hearing back from our patients is one way we can continue to improve our services. Please take a few minutes to complete the  written survey that you may receive in the mail after your visit with us. Thank you!             Your Updated Medication List - Protect others around you: Learn how to safely use, store and throw away your medicines at www.disposemymeds.org.          This list is accurate as of 12/6/18  3:18 PM.  Always use your most recent med list.                   Brand Name Dispense Instructions for use Diagnosis    albuterol 108 (90 Base) MCG/ACT inhaler    PROAIR HFA/PROVENTIL HFA/VENTOLIN HFA     Inhale 2 puffs into the lungs as needed        aspirin 81 MG tablet    ASA     Take 81 mg by mouth daily        atorvastatin 10 MG tablet    LIPITOR    30 tablet    Take 1 tablet (10 mg) by mouth daily    Amaurosis fugax of left eye       fluocinonide 0.05 % external gel    LIDEX     Apply topically 2 times daily        valACYclovir 1000 mg tablet    VALTREX     Take 2 tablets by mouth 2 times daily as needed        vitamin D3 1000 units Caps      Take 1 capsule by mouth daily        ZANTAC PO      Take 150 mg by mouth as needed for heartburn

## 2018-12-06 NOTE — PROGRESS NOTES
HPI       Sarah العلي is a 68 year old  who presents for   Chief Complaint   Patient presents with     Establish Care     discuss healthcare: right ear hearing/pressure in the ear, migraines, checks BP at home per pt     Refill Request     ventolin inhaler     Sarah العلي is a 69 yo woman who presents today to establish care with new PCP.    PMH significant for small (5mm) ICA aneurysm found incidentally on imaging obtained by [facility] to evaluate possible TIA in 6/18. Ultimately diagnosed with ocular migraine with aura and established with Dr. Ramírez in [facility].  Sought second opinion re aneurysm at [place]. All providers have recommended surveillance without surgical intervention.   Migraines are managed with Ibuprofen with onset of aura. Typically keeps her from going on to have headache. When headache does come, it is L sided, associated with photophobia.    Additional active issues:     Raynaud's phenomenon, episodic   -Not on any medications     GERD  - well controlled on ranitidine and diet  -stopped taking PPI because she read that is is associated with dementia    Osteopenia   -last DEXA scan was 2015.   -she does not take calcium because it exacerbates her constipation  -takes vitamin D    Constipation  -taking stool softener prn    Chronic microscopic hematuria.   -had a cystogram, renal US, and IVP done.    Problem, Medication and Allergy Lists were      Patient Active Problem List    Diagnosis Date Noted     White coat syndrome without diagnosis of hypertension 12/06/2018     Priority: Medium     Bilateral hearing loss, unspecified hearing loss type 12/06/2018     Priority: Medium     Right internal carotid artery aneurysm 12/06/2018     Priority: Medium     5 mm       CARDIOVASCULAR SCREENING; LDL GOAL LESS THAN 160 07/17/2013     Priority: Medium     GERD (gastroesophageal reflux disease)      Priority: Medium   ,     Current Outpatient Medications   Medication Sig  Dispense Refill     albuterol (PROAIR HFA/PROVENTIL HFA/VENTOLIN HFA) 108 (90 BASE) MCG/ACT Inhaler Inhale 2 puffs into the lungs as needed       aspirin 81 MG tablet Take 81 mg by mouth daily       Cholecalciferol (VITAMIN D3) 1000 UNITS CAPS Take 1 capsule by mouth daily       fluocinonide (LIDEX) 0.05 % gel Apply topically 2 times daily       RaNITidine HCl (ZANTAC PO) Take 150 mg by mouth as needed for heartburn       valACYclovir (VALTREX) 1000 mg tablet Take 2 tablets by mouth 2 times daily as needed  2     atorvastatin (LIPITOR) 10 MG tablet Take 1 tablet (10 mg) by mouth daily (Patient not taking: Reported on 12/6/2018) 30 tablet 2   ,     Allergies   Allergen Reactions     Nickel      Other reaction(s): *Unknown   .    Patient is a new patient to this clinic and so  I reviewed/updated the Past Medical History, the Family History and the Social History .         Review of Systems:   Review of Systems   Constitutional: Negative.    HENT: Positive for hearing loss. Negative for congestion, dental problem, ear discharge, ear pain, mouth sores, nosebleeds, postnasal drip, rhinorrhea, sinus pressure, sinus pain, sneezing, sore throat, tinnitus and trouble swallowing.         Decreased hearing B. R> L. Has occurred over time   Eyes: Negative for discharge, redness and itching.   Respiratory: Negative.    Cardiovascular: Negative.    Gastrointestinal: Positive for constipation. Negative for abdominal distention, abdominal pain, anal bleeding, blood in stool, diarrhea, nausea, rectal pain and vomiting.   Endocrine: Negative.    Genitourinary: Negative.    Musculoskeletal: Negative.    Allergic/Immunologic: Negative.    Neurological: Positive for headaches. Negative for dizziness, tremors, seizures, syncope, facial asymmetry, speech difficulty, weakness, light-headedness and numbness.        Perimenopausal onset of migraines. Occur 3-4 times a month. Always starts with aura. Abortive therapy stops full blown headache  from occurring.   Hematological: Negative.    Psychiatric/Behavioral: Negative for confusion, decreased concentration, dysphoric mood and sleep disturbance. The patient is nervous/anxious.         Anxious about aneurysm              Physical Exam:     Vitals:    12/06/18 1409 12/06/18 1410   BP: 146/80 130/82   Pulse: 73    Resp: 16    Temp: 97.5  F (36.4  C)    TempSrc: Oral    SpO2: 100%    Weight: 129 lb 3.2 oz (58.6 kg)      Body mass index is 20.36 kg/(m^2).       Physical Exam   Constitutional: She is oriented to person, place, and time. She appears well-developed and well-nourished. No distress.   HENT:   Mouth/Throat: Oropharynx is clear and moist.   Eyes: Conjunctivae and EOM are normal. Pupils are equal, round, and reactive to light. No scleral icterus.   Neck: Normal range of motion. Neck supple. No JVD present. No thyromegaly present.   Cardiovascular: Normal rate and regular rhythm. Exam reveals no gallop and no friction rub.   No murmur heard.  Pulmonary/Chest: Effort normal and breath sounds normal. No respiratory distress.   Abdominal: Soft. There is no tenderness.   Musculoskeletal: She exhibits no edema.   Lymphadenopathy:     She has no cervical adenopathy.   Neurological: She is alert and oriented to person, place, and time. She has normal reflexes. No cranial nerve deficit. Coordination normal.   Psychiatric: She has a normal mood and affect.         Results:   No testing ordered today    Assessment and Plan      Sarah العلي is a 68 y.o female with a pmh of ICA-aneurysm,  that presents to establish care.     Encounter to establish care    Right internal carotid artery aneurysm  Ocular migraine with aura  - established with neurology and neuroophthalmogy. Will review all records    Suspected white coat syndrome without diagnosis of hypertension  - please check BP at home periodically before next visit and bring BP cuff to calibrate    Bilateral hearing loss, unspecified hearing loss  type  -patient ok with using hearing aid  -mostly localizes to right ear.   -previously saw ENT  - AUDIOLOGY ADULT REFERRAL - INTERNAL - Hearing aid consultation      Osteopenia, unspecified location  - DX Hip/Pelvis/Spine (DEXA); Future  -Increase po calcium intake   -continue taking Vitamin D  -moderate weight bearing exercise as tolerated     Follow up plan  1-2 months for recheck  Can choose to do complete annual Medicare Wellness exam  Bring BP cuff next visit     There are no discontinued medications.    Options for treatment and follow-up care were reviewed with the patient. Sarah العلي  engaged in the decision making process and verbalized understanding of the options discussed and agreed with the final plan.    Yosvany Aguilar, MS3  Family Medicine Service    I was present with the medical student who participated in the service and in the documentation of this note. I have verified the history and personally performed the physical exam and medical decision making, and have verified the content of the note, which accurately reflects my assessment of the patient and the plan of care.     Alyson Unger MD

## 2018-12-10 ENCOUNTER — TRANSFERRED RECORDS (OUTPATIENT)
Dept: HEALTH INFORMATION MANAGEMENT | Facility: CLINIC | Age: 68
End: 2018-12-10

## 2018-12-11 ENCOUNTER — TELEPHONE (OUTPATIENT)
Dept: FAMILY MEDICINE | Facility: CLINIC | Age: 68
End: 2018-12-11

## 2018-12-11 NOTE — TELEPHONE ENCOUNTER
[facility] phone call message - order or referral request from patient:     Order or referral being requested: Referral to radiology  At Location: [facility] radiology    Additional Details: Patient calling to advise that she called to schedule DEXA scan, but was not able to schedule appointment because referral did not include location of osteopenia. Medicare will not pay for scan without this information.    Referral only -Specialty Radiology Location [facility] radiology    OK to leave a message on voice mail? Yes    Primary language: English      needed? No    Call taken on December 11, 2018 at 8:21 AM by Camilla Gray    Order request route to [facility] TRIAGE   Referrals Route to [facility] (Green/[place]/Purple) CARE COORDINATOR

## 2018-12-12 ENCOUNTER — TELEPHONE (OUTPATIENT)
Dept: FAMILY MEDICINE | Facility: CLINIC | Age: 68
End: 2018-12-12

## 2018-12-12 DIAGNOSIS — M85.852 OSTEOPENIA OF LEFT HIP: Primary | ICD-10-CM

## 2018-12-12 ASSESSMENT — ENCOUNTER SYMPTOMS
ALLERGIC/IMMUNOLOGIC NEGATIVE: 1
DIARRHEA: 0
TROUBLE SWALLOWING: 0
RESPIRATORY NEGATIVE: 1
ENDOCRINE NEGATIVE: 1
ABDOMINAL PAIN: 0
EYE REDNESS: 0
CONSTITUTIONAL NEGATIVE: 1
SINUS PAIN: 0
ABDOMINAL DISTENTION: 0
ANAL BLEEDING: 0
HEMATOLOGIC/LYMPHATIC NEGATIVE: 1
DYSPHORIC MOOD: 0
CONFUSION: 0
DIZZINESS: 0
RECTAL PAIN: 0
SPEECH DIFFICULTY: 0
CONSTIPATION: 1
DECREASED CONCENTRATION: 0
HEADACHES: 1
LIGHT-HEADEDNESS: 0
NUMBNESS: 0
RHINORRHEA: 0
SEIZURES: 0
FACIAL ASYMMETRY: 0
CARDIOVASCULAR NEGATIVE: 1
MUSCULOSKELETAL NEGATIVE: 1
EYE ITCHING: 0
SLEEP DISTURBANCE: 0
BLOOD IN STOOL: 0
SORE THROAT: 0
NAUSEA: 0
TREMORS: 0
VOMITING: 0
NERVOUS/ANXIOUS: 1
EYE DISCHARGE: 0
WEAKNESS: 0
SINUS PRESSURE: 0

## 2018-12-12 NOTE — TELEPHONE ENCOUNTER
[facility] phone call message- general phone call:    Reason for call: patient states she has a dexa scan scheduled for 12/21 and they need the specific location of the osteopenia (hips) for medicare to pay. She had the last one done at the [facility] in 2015.    Action Desired: She would like a return call when this is corrected.    Return call needed: Yes    OK to leave a message on voice mail? Yes    Advised patient response may take up to 2 business days: Yes    Primary language: English      needed? No    Call taken on December 12, 2018 at 9:41 AM by Geraldine Frazier    Route to [facility] TRIAGE

## 2018-12-13 NOTE — TELEPHONE ENCOUNTER
12/11/18 Contacted patient to inform her that order has been updated and she can call to schedule her Dexa Scan. Patient Thankful.    Leidy Lynn  Care Coordinator

## 2018-12-21 ENCOUNTER — OFFICE VISIT (OUTPATIENT)
Dept: AUDIOLOGY | Facility: CLINIC | Age: 68
End: 2018-12-21
Attending: FAMILY MEDICINE
Payer: COMMERCIAL

## 2018-12-21 ENCOUNTER — ANCILLARY PROCEDURE (OUTPATIENT)
Dept: BONE DENSITY | Facility: CLINIC | Age: 68
End: 2018-12-21
Attending: FAMILY MEDICINE
Payer: COMMERCIAL

## 2018-12-21 ENCOUNTER — TELEPHONE (OUTPATIENT)
Dept: FAMILY MEDICINE | Facility: CLINIC | Age: 68
End: 2018-12-21

## 2018-12-21 DIAGNOSIS — H93.11 TINNITUS, RIGHT: Primary | ICD-10-CM

## 2018-12-21 DIAGNOSIS — H90.3 SENSORINEURAL HEARING LOSS, BILATERAL: Primary | ICD-10-CM

## 2018-12-21 DIAGNOSIS — M85.852 OSTEOPENIA OF LEFT HIP: ICD-10-CM

## 2018-12-21 DIAGNOSIS — R26.89 IMBALANCE: ICD-10-CM

## 2018-12-21 NOTE — TELEPHONE ENCOUNTER
[facility] phone call message - order or referral request from patient:     Order or referral being requested: Requested order for Otologist referral    Referral only -Specialty Otologist-referral to [facility] [address]    OK to leave a message on voice mail? Yes    Primary language: English      needed? No    Call taken on December 21, 2018 at 3:00 PM by Treva Pena    Order request route to [facility] TRIAGE   Referrals Route to [facility] (Green/[place]/Purple) CARE COORDINATOR

## 2018-12-21 NOTE — PROGRESS NOTES
"AUDIOLOGY REPORT    SUBJECTIVE:  Sarah العلي is a 68 year old female who was seen in Audiology at the [facility] [place] for audiologic evaluation, referred by Alyson Unger. The patient underwent an audiologic evaluation at [facility] in 2004 and results indicated a severe high-frequency sensorineural hearing loss for the right ear with normal hearing in the left. The patient attributed this asymmetry in hearing to the fact that she wore an in-the-ear piece for talking on the phone at work for approximately twenty years. The patient reports the onset of bilateral \"ear plugging\" in February of 2018 after an airline flight. The patient reports that her left ear did eventually clear but that her right ear remains \"plugged\" since that time, although the severity has lessened. Another hearing evaluation completed at [facility] April of 2018 indicated borderline normal to moderate sensorineural hearing loss for the left ear and borderline normal to severe sensorineural hearing loss for the right ear with 100% speech understanding bilaterally. The patient reports that she discussed with the clinic concerns for imbalance at that time. The patient reports that an old MRI was reviewed and that ENT had no further concerns at that time; the patient is unsure if contrast was used for that image but she does not believe so. The patient reports the onset of right constant buzzing tinnitus a few weeks ago and she reports a suspected further decline in right ear hearing. The patient denies any other ear related issues, true vertigo, history of head trauma.      OBJECTIVE:  Fall Risk Screen:  1. Have you fallen two or more times in the past year? No  2. Have you fallen and had an injury in the past year? No    Otoscopic exam indicates ears are clear of cerumen bilaterally     Pure Tone Thresholds assessed using conventional audiometry with good  reliability from 250-8000 Hz " bilaterally using circumaural headphones and reassessed using insert earphones    RIGHT:  Moderate sloping to severe sensorineural hearing loss; 10-25 dB decrease 250-3000 Hz and 20 dB improvement 8000 Hz    LEFT:    Normal at 250 Hz sloping to mild sensorineural hearing loss 500-1000 Hz rising to borderline normal hearing 3338-1797 Hz; 25 dB improvement 8000 Hz  Tympanogram:    RIGHT: normal eardrum mobility    LEFT:   normal eardrum mobility    Reflexes (reported by stimulus ear):  RIGHT: Ipsilateral is present at normal levels  RIGHT: Contralateral is absent at frequencies tested  LEFT:   Ipsilateral is present at normal levels  LEFT:   Contralateral is absent at frequencies tested    Speech Reception/Detection Threshold:    RIGHT: 45 dB HL (SDT)    LEFT:   30 dB HL (SRT)  Word Recognition Score:     RIGHT: 24% at 75 dB HL using NU-6 recorded word list; significant decrease (100% 4/2018)    LEFT:   100% at 65 dB HL using NU-6 recorded word list; stable    ASSESSMENT:  Left ear normal to mild sensorineural hearing loss, right ear moderate to severe sensorineural hearing loss with decreased speech understanding abilities.    PLAN:    Patient was counseled regarding hearing loss and impact on communication.  It is recommended that the patient consult with our team in ear, nose and throat due to asymmetry in hearing and speech understanding and symptoms of right-ear tinnitus and imbalance. Patient may consider the use of a BiCROS hearing system (contralateral routing of signals to hearing aid on better hearing ear); traditional hearing amplification is contraindicated for the right ear due to decreased speech understanding abilities.  Please call this clinic with questions regarding these results or recommendations.      You Aranda.  Licensed Audiologist  [place] #[id number]

## 2018-12-26 NOTE — TELEPHONE ENCOUNTER
12/26/18 Forwarded message to  for Otologists referral, if appropriate.    Leidy Lynn  Care Coordinator

## 2018-12-28 NOTE — TELEPHONE ENCOUNTER
ENT referral  [phone]  Hi!      I tried calling pt but phone number in chart is not working.       Thanks!   Isabel     Sent letter to patient home

## 2018-12-31 ENCOUNTER — TELEPHONE (OUTPATIENT)
Dept: FAMILY MEDICINE | Facility: CLINIC | Age: 68
End: 2018-12-31

## 2018-12-31 NOTE — TELEPHONE ENCOUNTER
[facility] phone call message- patient requesting results.    Test: Dexa  Date of test: 12/21/2018    Additional Comments:please call with results    Lab tab checked to verify if result comment present with in each order: Yes    Letters tab checked to verify if lab result letter has been entered: Yes    OK to leave a message on voice mail? Yes    Advised patient response may take up to 2 business days: Yes    Primary language: English      needed? No    Call taken on December 31, 2018 at 10:22 AM by Treva Pena    [facility]

## 2019-01-07 DIAGNOSIS — M81.0 AGE-RELATED OSTEOPOROSIS WITHOUT CURRENT PATHOLOGICAL FRACTURE: Primary | ICD-10-CM

## 2019-01-07 DIAGNOSIS — H90.5 UNILATERAL SENSORINEURAL HEARING LOSS: Primary | ICD-10-CM

## 2019-01-08 NOTE — PROGRESS NOTES
Endocrinology referral    Patient is scheduled on Friday, 2/22/19 at 9:50am with Dr. Nichols. Have a great day!      Thanks,   Alina

## 2019-01-14 ENCOUNTER — PATIENT OUTREACH (OUTPATIENT)
Dept: CARE COORDINATION | Facility: CLINIC | Age: 69
End: 2019-01-14

## 2019-01-23 NOTE — TELEPHONE ENCOUNTER
FUTURE VISIT INFORMATION      FUTURE VISIT INFORMATION:    Date: 1/26/19    Time: 8AM    Location: [facility]  REFERRAL INFORMATION:    Referring provider:  Alyson Unger MD    Referring providers clinic:  [facility]     Reason for visit/diagnosis  Unilateral sensorineural hearing loss     RECORDS REQUESTED FROM:       Clinic name Comments Records Status Imaging Status   [facility]  12/21/18 Audio with Sammie Smart EPIC    [facility] 12/10/18 MRI Brain Angio Report: scanned in [facility]    [facility] 4/12/18 Audio     4/4/16 notes with Dr Roque Cruz  Scanned in [facility]   2/26/18 telephone encounter with Dr Brittaney Tompkins    2/8/18 notes with Dr Tompkins   Care Everywhere

## 2019-01-26 ENCOUNTER — ANCILLARY PROCEDURE (OUTPATIENT)
Dept: CT IMAGING | Facility: CLINIC | Age: 69
End: 2019-01-26
Payer: COMMERCIAL

## 2019-01-26 ENCOUNTER — PRE VISIT (OUTPATIENT)
Dept: OTOLARYNGOLOGY | Facility: CLINIC | Age: 69
End: 2019-01-26

## 2019-01-26 ENCOUNTER — OFFICE VISIT (OUTPATIENT)
Dept: OTOLARYNGOLOGY | Facility: CLINIC | Age: 69
End: 2019-01-26
Attending: FAMILY MEDICINE
Payer: COMMERCIAL

## 2019-01-26 VITALS — WEIGHT: 129 LBS | BODY MASS INDEX: 20.25 KG/M2 | HEIGHT: 67 IN

## 2019-01-26 DIAGNOSIS — H90.3 SENSORINEURAL HEARING LOSS (SNHL) OF BOTH EARS: ICD-10-CM

## 2019-01-26 DIAGNOSIS — R42 DIZZINESS: Primary | ICD-10-CM

## 2019-01-26 DIAGNOSIS — R42 DIZZINESS: ICD-10-CM

## 2019-01-26 ASSESSMENT — MIFFLIN-ST. JEOR: SCORE: 1147.77

## 2019-01-26 ASSESSMENT — PAIN SCALES - GENERAL: PAINLEVEL: NO PAIN (0)

## 2019-01-26 NOTE — PATIENT INSTRUCTIONS
1.  You were seen in the ENT Clinic today by Dr. Patel.  If you have any questions or concerns after your appointment, please call [phone]. Press option #1 for scheduling related needs. Press option #3 for Nurse advice.  2.  Please schedule an appointment for the following:   - CT Scan - Temporal Bones   - Audiology - Vestibular Testing  3.  Plan is to return to clinic to see Dr. Nissen or Dr. Conde for further evaluation.    Dolores Fitch LPN  [facility]    The patient presents with a history of dizziness.  The patient and I have discussed proceeding with further evaluation of the symptoms.  The patient will be referred for vestibular evaluation and hearing testing, a CT scan temporal bones and a neurotology evaluation with Dr. Manuel Conde or Dr. Rick Nissen.

## 2019-01-26 NOTE — PROGRESS NOTES
The patient presents with a history of dizziness. The patient reports that this began in the past year after an airplane flight. During landing, she was unable to clear her ears. After the flight, she developed dizziness. She reports that after flying or driving, she develops dizziness more than during the period of motion. She has a history of migraine headaches. She has been evaluated in the past year for a period of decreased vision in one part of the left eye vision. She has a progressive sensorineural hearing loss that is worse in the right ear than in the left ear. The patient denies sinusitis, rhinitis, facial pain, nasal obstruction or purulent nasal discharge. The patient denies chronic or recurrent tonsillitis, chronic or recurrent pharyngitis. The patient is formerly a stewardess and a nurse.     This patient is seen in consultation at the request of Dr. Alyson Unger.    All other systems were reviewed and they are either negative or they are not directly pertinent to this Otolaryngology examination.      Past Medical History:    Past Medical History:   Diagnosis Date     Cerebral aneurysm 12/2017     GERD (gastroesophageal reflux disease)      Lichen planus      Migraine     with auora     Osteopenia 2010       Past Surgical History:    Past Surgical History:   Procedure Laterality Date     DILATION AND CURETTAGE       LAPAROSCOPY       TONSILLECTOMY & ADENOIDECTOMY         Medications:      Current Outpatient Medications:      albuterol (PROAIR HFA/PROVENTIL HFA/VENTOLIN HFA) 108 (90 BASE) MCG/ACT Inhaler, Inhale 2 puffs into the lungs as needed, Disp: , Rfl:      aspirin 81 MG tablet, Take 81 mg by mouth daily, Disp: , Rfl:      Cholecalciferol (VITAMIN D3) 1000 UNITS CAPS, Take 1 capsule by mouth daily, Disp: , Rfl:      fluocinonide (LIDEX) 0.05 % gel, Apply topically 2 times daily, Disp: , Rfl:      RaNITidine HCl (ZANTAC PO), Take 150 mg by mouth as needed for heartburn, Disp: , Rfl:       valACYclovir (VALTREX) 1000 mg tablet, Take 2 tablets by mouth 2 times daily as needed, Disp: , Rfl: 2     atorvastatin (LIPITOR) 10 MG tablet, Take 1 tablet (10 mg) by mouth daily (Patient not taking: Reported on 12/6/2018), Disp: 30 tablet, Rfl: 2    Allergies:    Atorvastatin and Nickel    Physical Examination:    The patient is a well developed, well nourished female in no apparent distress.  She is normocephalic, atraumatic with pupils equally round and reactive to light.    Oral Cavity Examination:  Normal mucosa with no masses or lesions  Nasal Examination: Normal mucosa with no masses or lesions  Ear Examination: Ear canals clear, tympanic membranes and middle ear spaces normal  Neurological Examination: Facial nerve function intact and symmetric.  The patient has no gaze induced or spontaneous nystagmus.   Integumentary Examination: No lesions on the skin of the head and neck  Neck Examination: No masses or lesions, no lymphadenopathy  Endocrine Examination: Normal thyroid examination    Assessment and Plan:    The patient presents with a history of dizziness.  The patient and I have discussed proceeding with further evaluation of the symptoms.  The patient will be referred for vestibular evaluation and hearing testing, a CT scan temporal bones and a neurotology evaluation with Dr. Manuel Conde or Dr. Rick Nissen.    CC: Dr. Alyson Unger

## 2019-01-26 NOTE — NURSING NOTE
"Chief Complaint   Patient presents with     Consult     Hearing Problem     Rapid Decrease Word Recognition      Tinnitus     Recent issue - Imbalance as well     Ht 1.702 m (5' 7\")   Wt 58.5 kg (129 lb)   BMI 20.20 kg/m      Dolores Fitch LPN    "

## 2019-01-26 NOTE — LETTER
1/26/2019       RE: Sarah العلي  [address] [place] [address]     Dear Colleague,    Thank you for referring your patient, Sarah العلي, to the [facility] at [facility]. Please see a copy of my visit note below.    The patient presents with a history of dizziness. The patient reports that this began in the past year after an airplane flight. During landing, she was unable to clear her ears. After the flight, she developed dizziness. She reports that after flying or driving, she develops dizziness more than during the period of motion. She has a history of migraine headaches. She has been evaluated in the past year for a period of decreased vision in one part of the left eye vision. She has a progressive sensorineural hearing loss that is worse in the right ear than in the left ear. The patient denies sinusitis, rhinitis, facial pain, nasal obstruction or purulent nasal discharge. The patient denies chronic or recurrent tonsillitis, chronic or recurrent pharyngitis. The patient is formerly a stewardess and a nurse.     This patient is seen in consultation at the request of Dr. Alyson Unger.    All other systems were reviewed and they are either negative or they are not directly pertinent to this Otolaryngology examination.      Past Medical History:    Past Medical History:   Diagnosis Date     Cerebral aneurysm 12/2017     GERD (gastroesophageal reflux disease)      Lichen planus      Migraine     with auora     Osteopenia 2010       Past Surgical History:    Past Surgical History:   Procedure Laterality Date     DILATION AND CURETTAGE       LAPAROSCOPY       TONSILLECTOMY & ADENOIDECTOMY         Medications:      Current Outpatient Medications:      albuterol (PROAIR HFA/PROVENTIL HFA/VENTOLIN HFA) 108 (90 BASE) MCG/ACT Inhaler, Inhale 2 puffs into the lungs as needed, Disp: , Rfl:      aspirin 81 MG tablet, Take 81 mg by mouth daily,  Disp: , Rfl:      Cholecalciferol (VITAMIN D3) 1000 UNITS CAPS, Take 1 capsule by mouth daily, Disp: , Rfl:      fluocinonide (LIDEX) 0.05 % gel, Apply topically 2 times daily, Disp: , Rfl:      RaNITidine HCl (ZANTAC PO), Take 150 mg by mouth as needed for heartburn, Disp: , Rfl:      valACYclovir (VALTREX) 1000 mg tablet, Take 2 tablets by mouth 2 times daily as needed, Disp: , Rfl: 2     atorvastatin (LIPITOR) 10 MG tablet, Take 1 tablet (10 mg) by mouth daily (Patient not taking: Reported on 12/6/2018), Disp: 30 tablet, Rfl: 2    Allergies:    Atorvastatin and Nickel    Physical Examination:    The patient is a well developed, well nourished female in no apparent distress.  She is normocephalic, atraumatic with pupils equally round and reactive to light.    Oral Cavity Examination:  Normal mucosa with no masses or lesions  Nasal Examination: Normal mucosa with no masses or lesions  Ear Examination: Ear canals clear, tympanic membranes and middle ear spaces normal  Neurological Examination: Facial nerve function intact and symmetric.  The patient has no gaze induced or spontaneous nystagmus.   Integumentary Examination: No lesions on the skin of the head and neck  Neck Examination: No masses or lesions, no lymphadenopathy  Endocrine Examination: Normal thyroid examination    Assessment and Plan:    The patient presents with a history of dizziness.  The patient and I have discussed proceeding with further evaluation of the symptoms.  The patient will be referred for vestibular evaluation and hearing testing, a CT scan temporal bones and a neurotology evaluation with Dr. Manuel Conde or Dr. Rick Nissen.    CC: Dr. Alyson Unger      Again, thank you for allowing me to participate in the care of your patient.      Sincerely,    Abelardo Patel MD

## 2019-02-08 ASSESSMENT — ENCOUNTER SYMPTOMS
SINUS PAIN: 0
DIZZINESS: 1
DYSURIA: 0
HEADACHES: 0
SMELL DISTURBANCE: 0
DIFFICULTY URINATING: 0
TROUBLE SWALLOWING: 0
POOR WOUND HEALING: 0
WEAKNESS: 0
TASTE DISTURBANCE: 0
SEIZURES: 0
NECK MASS: 0
SPEECH CHANGE: 0
PARALYSIS: 0
NUMBNESS: 0
TREMORS: 0
HEMATURIA: 0
HOARSE VOICE: 0
SINUS CONGESTION: 0
FLANK PAIN: 0
NAIL CHANGES: 0
LOSS OF CONSCIOUSNESS: 0
SKIN CHANGES: 0
SORE THROAT: 0
TINGLING: 0
DISTURBANCES IN COORDINATION: 0
MEMORY LOSS: 0

## 2019-02-21 NOTE — PROGRESS NOTES
Endocrinology Note         Sarah is a 68 year old female presents today for osteoporosis.    HPI  Sarah is a 68 year old female with hx of right internal carotid artery aneurysm, ocular migraine with aura, Raynaud's phenomenon, GERD presents today for osteoporosis    Patient also with osteopenia for long time.  DEXA scan in 2013 showed T score of -2.4 left femoral neck.  She has had serial DEXA scan every 2 or 3 years that continues to show osteopenia in the left femoral neck.  The most recent DEXA scan in December 2018 revealed T score of -2.7 at left femoral neck.    Patient denies history of fracture. She states her mother may have had osteoporosis.  She has been on proton pump inhibitor for more than 10 years for GERD and stopped 2 years ago.  She was on several short course of prednisone for erythema multiforme. Her last prednisone course was 2 years ago.  She has never been on any steroid injection.  She denies any history of rheumatoid arthritis or autoimmune disease.  She does not smoked.  She drinks wine a couple of time a month.  Weight has been stable.  BMI ranged in 20 kg/m2.  She is active and walks regularly. She denies imbalance or gait instability.  She has had constipation so she does not take calcium supplement.  She reports 3 glasses of milk, yogurt, cheese, fish and dark green vegetable.    Past Medical History  Past Medical History:   Diagnosis Date     Cerebral aneurysm 12/2017     GERD (gastroesophageal reflux disease)      Lichen planus      Migraine     with auora     Osteopenia 2010       Allergies  Allergies   Allergen Reactions     Atorvastatin Rash     Nickel Rash     Other reaction(s): *Unknown  Other reaction(s): *Unknown     Medications  Current Outpatient Medications   Medication Sig Dispense Refill     albuterol (PROAIR HFA/PROVENTIL HFA/VENTOLIN HFA) 108 (90 BASE) MCG/ACT Inhaler Inhale 2 puffs into the lungs as needed       aspirin 81 MG tablet Take 81 mg by mouth daily        atorvastatin (LIPITOR) 10 MG tablet Take 1 tablet (10 mg) by mouth daily (Patient not taking: Reported on 2018) 30 tablet 2     Cholecalciferol (VITAMIN D3) 1000 UNITS CAPS Take 1 capsule by mouth daily       fluocinonide (LIDEX) 0.05 % gel Apply topically 2 times daily       RaNITidine HCl (ZANTAC PO) Take 150 mg by mouth as needed for heartburn       valACYclovir (VALTREX) 1000 mg tablet Take 2 tablets by mouth 2 times daily as needed  2     Family History  family history includes Breast Cancer in her paternal aunt; Cancer in her paternal grandmother; Cerebrovascular Disease in her father; Diabetes in her maternal grandmother; Heart Failure in her maternal grandfather; Other - See Comments in her mother.   Maternal grandmother had type 1 diabetes  Cousin has type 1 diabetes    Social History  Social History     Tobacco Use     Smoking status: Former Smoker     Types: Cigarettes     Last attempt to quit: 1983     Years since quittin.1     Smokeless tobacco: Never Used   Substance Use Topics     Alcohol use: Yes     Alcohol/week: 0.6 - 1.2 oz     Types: 1 - 2 Glasses of wine per week     Comment: EVERY 2 MONTHS     Drug use: No   former smoker, quit   Drink wine a couple times per month  She is retired from register nurse    [place]  Constitutional: no weight change, good energy  Eyes: no vision change, diplopia or red eyes   Neck: no difficulty swallowing, no choking, no neck pain, no neck swelling  Cardiovascular: no chest pain, palpitations  Respiratory: no dyspnea, cough, shortness of breath or wheezing   GI: no nausea, vomiting, diarrhea, + constipation, no abdominal pain   : no change in urine, no dysuria or hematuria  Musculoskeletal: no joint or muscle pain or swelling   Integumentary: no concerning lesions or moles   Neuro: no loss of strength or sensation, no numbness or tingling, no tremor, no dizziness, no headache   Endo: no polyuria or polydipsia, no temperature intolerance  "  Heme/Lymph: no concerning bumps, no bleeding problems   Allergy: no environmental allergies   Psych: no depression or anxiety, no sleep problems.    Physical Exam  /75   Pulse 79   Ht 1.691 m (5' 6.58\")   Wt 59 kg (130 lb 1.6 oz)   BMI 20.64 kg/m    Body mass index is 20.64 kg/m .  Constitutional: no distress, comfortable, pleasant   Eyes: anicteric, normal extra-ocular movements, no lid lag or retraction  Neck: no thyromegaly, no discrete nodule  Cardiovascular: regular rate and rhythm, normal S1 and S2, no murmurs  Respiratory: clear to auscultation, no wheezes or crackles, normal breath sounds   Gastrointestinal:  nontender, no hepatosplenomegaly, no masses   Musculoskeletal: no edema   Skin: no concerning lesions, no jaundice   Neurological: cranial nerves intact, 2+reflexes at patella, normal gait, no tremor on outstretched hands bilaterally  Psychological: appropriate mood       RESULTS  I have personally reviewed labs and images. I also reviewed labs with patient and discussed the result and plan of care.    DXA 7/17/2013  FINDINGS: The worst lumbar spine T-score measurement is -0.7 at L2.   The right femoral neck T-score measurement is -2.0 and the left femoral neck measurement is -2.4, compared with a measurement of -1.6 on the 2011 exam. The bone density measurement left femoral neck is 0.702 gm/cm2.     IMPRESSION: Prominent osteopenia/borderline osteoporosis in the femoral neck, progressed since 2011.    DXA 8/10/2015  T-SCORES:  Lumbar Spine L1-L4 T-score: -0.5     Left Hip (Neck) T-score: -2.3  Right Hip (Neck) T-score: -1.9     Hip lowest BMD: 0.712 gm/cm2.     PERCENT CHANGE since 7/17/2013:  Lumbar Spine: +0.1%   Femurs: +2.7%     IMPRESSION: Bilateral hip osteopenia.     DXA 12/21/2018      ASSESSMENT:    Sarah is a 68 year old female with hx of right internal carotid artery aneurysm, ocular migraine with aura, Raynaud's phenomenon, GERD presents today for osteoporosis    1) " Osteoporosis: she has had long standing low bone density at least since 2013. The most recent DXA in 12/2018 showed osteoporosis particularly in the left hip. She is at risk from hip fracture.   - risk factors includes prolonged use of proton pump inhibitor, family hx of osteoporosis, intermittent use of steroid  - discussed indicator for pharmacologic treatment including bisphosphonate vs. Denosumab. Risk and benefit discussed  - she opts for zoledronic acid infusion at this time given hx of GERD  - she will continue to optimize calcium from dairy and vitamin D supplement  - she will continue with regular weight bear exercise -- walking    PLAN:   - lab today for calcium, vitamin D, Cr, PTH, phosphorus, TTG (celiac test)  - plan for Reclast infusion  - repeat DXA in 1 year after Reclast infusion and return visit afterward    Magdalena Davis MD  Division of Diabetes and Endocrinology  Department of Medicine  [phone]

## 2019-02-22 ENCOUNTER — TELEPHONE (OUTPATIENT)
Dept: ENDOCRINOLOGY | Facility: CLINIC | Age: 69
End: 2019-02-22

## 2019-02-22 ENCOUNTER — MYC MEDICAL ADVICE (OUTPATIENT)
Dept: ENDOCRINOLOGY | Facility: CLINIC | Age: 69
End: 2019-02-22

## 2019-02-22 ENCOUNTER — OFFICE VISIT (OUTPATIENT)
Dept: ENDOCRINOLOGY | Facility: CLINIC | Age: 69
End: 2019-02-22
Payer: COMMERCIAL

## 2019-02-22 VITALS
WEIGHT: 130.1 LBS | HEART RATE: 79 BPM | DIASTOLIC BLOOD PRESSURE: 75 MMHG | HEIGHT: 67 IN | SYSTOLIC BLOOD PRESSURE: 134 MMHG | BODY MASS INDEX: 20.42 KG/M2

## 2019-02-22 DIAGNOSIS — M81.0 AGE-RELATED OSTEOPOROSIS WITHOUT CURRENT PATHOLOGICAL FRACTURE: Primary | ICD-10-CM

## 2019-02-22 DIAGNOSIS — M81.0 AGE-RELATED OSTEOPOROSIS WITHOUT CURRENT PATHOLOGICAL FRACTURE: ICD-10-CM

## 2019-02-22 LAB
ALBUMIN SERPL-MCNC: 4.1 G/DL (ref 3.4–5)
CALCIUM SERPL-MCNC: 9 MG/DL (ref 8.5–10.1)
CREAT SERPL-MCNC: 0.71 MG/DL (ref 0.52–1.04)
DEPRECATED CALCIDIOL+CALCIFEROL SERPL-MC: 35 UG/L (ref 20–75)
GFR SERPL CREATININE-BSD FRML MDRD: 87 ML/MIN/{1.73_M2}
PTH-INTACT SERPL-MCNC: 54 PG/ML (ref 18–80)

## 2019-02-22 RX ORDER — ZOLEDRONIC ACID 5 MG/100ML
5 INJECTION, SOLUTION INTRAVENOUS ONCE
Status: CANCELLED
Start: 2019-02-25 | End: 2019-02-25

## 2019-02-22 ASSESSMENT — MIFFLIN-ST. JEOR: SCORE: 1146.01

## 2019-02-22 ASSESSMENT — PAIN SCALES - GENERAL: PAINLEVEL: NO PAIN (0)

## 2019-02-22 NOTE — LETTER
2/22/2019       RE: Sarah العلي  [id number] [place] [address]     Dear Colleague,    Thank you for referring your patient, Sarah العلي, to the [facility] ENDOCRINOLOGY at [facility]. Please see a copy of my visit note below.         Endocrinology Note         Sarah is a 68 year old female presents today for osteoporosis.    HPI  Sarah is a 68 year old female with hx of right internal carotid artery aneurysm, ocular migraine with aura, Raynaud's phenomenon, GERD presents today for osteoporosis    Patient also with osteopenia for long time.  DEXA scan in 2013 showed T score of -2.4 left femoral neck.  She has had serial DEXA scan every 2 or 3 years that continues to show osteopenia in the left femoral neck.  The most recent DEXA scan in December 2018 revealed T score of -2.7 at left femoral neck.    Patient denies history of fracture. She states her mother may have had osteoporosis.  She has been on proton pump inhibitor for more than 10 years for GERD and stopped 2 years ago.  She was on several short course of prednisone for erythema multiforme. Her last prednisone course was 2 years ago.  She has never been on any steroid injection.  She denies any history of rheumatoid arthritis or autoimmune disease.  She does not smoked.  She drinks wine a couple of time a month.  Weight has been stable.  BMI ranged in 20 kg/m2.  She is active and walks regularly. She denies imbalance or gait instability.  She has had constipation so she does not take calcium supplement.  She reports 3 glasses of milk, yogurt, cheese, fish and dark green vegetable.    Past Medical History  Past Medical History:   Diagnosis Date     Cerebral aneurysm 12/2017     GERD (gastroesophageal reflux disease)      Lichen planus      Migraine     with auora     Osteopenia 2010       Allergies  Allergies   Allergen Reactions     Atorvastatin Rash     Nickel Rash     Other reaction(s):  *Unknown  Other reaction(s): *Unknown     Medications  Current Outpatient Medications   Medication Sig Dispense Refill     albuterol (PROAIR HFA/PROVENTIL HFA/VENTOLIN HFA) 108 (90 BASE) MCG/ACT Inhaler Inhale 2 puffs into the lungs as needed       aspirin 81 MG tablet Take 81 mg by mouth daily       atorvastatin (LIPITOR) 10 MG tablet Take 1 tablet (10 mg) by mouth daily (Patient not taking: Reported on 2018) 30 tablet 2     Cholecalciferol (VITAMIN D3) 1000 UNITS CAPS Take 1 capsule by mouth daily       fluocinonide (LIDEX) 0.05 % gel Apply topically 2 times daily       RaNITidine HCl (ZANTAC PO) Take 150 mg by mouth as needed for heartburn       valACYclovir (VALTREX) 1000 mg tablet Take 2 tablets by mouth 2 times daily as needed  2     Family History  family history includes Breast Cancer in her paternal aunt; Cancer in her paternal grandmother; Cerebrovascular Disease in her father; Diabetes in her maternal grandmother; Heart Failure in her maternal grandfather; Other - See Comments in her mother.   Maternal grandmother had type 1 diabetes  Cousin has type 1 diabetes    Social History  Social History     Tobacco Use     Smoking status: Former Smoker     Types: Cigarettes     Last attempt to quit: 1983     Years since quittin.1     Smokeless tobacco: Never Used   Substance Use Topics     Alcohol use: Yes     Alcohol/week: 0.6 - 1.2 oz     Types: 1 - 2 Glasses of wine per week     Comment: EVERY 2 MONTHS     Drug use: No   former smoker, quit 1983  Drink wine a couple times per month  She is retired from register nurse    [place]  Constitutional: no weight change, good energy  Eyes: no vision change, diplopia or red eyes   Neck: no difficulty swallowing, no choking, no neck pain, no neck swelling  Cardiovascular: no chest pain, palpitations  Respiratory: no dyspnea, cough, shortness of breath or wheezing   GI: no nausea, vomiting, diarrhea, + constipation, no abdominal pain   : no change in urine, no  "dysuria or hematuria  Musculoskeletal: no joint or muscle pain or swelling   Integumentary: no concerning lesions or moles   Neuro: no loss of strength or sensation, no numbness or tingling, no tremor, no dizziness, no headache   Endo: no polyuria or polydipsia, no temperature intolerance   Heme/Lymph: no concerning bumps, no bleeding problems   Allergy: no environmental allergies   Psych: no depression or anxiety, no sleep problems.    Physical Exam  /75   Pulse 79   Ht 1.691 m (5' 6.58\")   Wt 59 kg (130 lb 1.6 oz)   BMI 20.64 kg/m     Body mass index is 20.64 kg/m .  Constitutional: no distress, comfortable, pleasant   Eyes: anicteric, normal extra-ocular movements, no lid lag or retraction  Neck: no thyromegaly, no discrete nodule  Cardiovascular: regular rate and rhythm, normal S1 and S2, no murmurs  Respiratory: clear to auscultation, no wheezes or crackles, normal breath sounds   Gastrointestinal:  nontender, no hepatosplenomegaly, no masses   Musculoskeletal: no edema   Skin: no concerning lesions, no jaundice   Neurological: cranial nerves intact, 2+reflexes at patella, normal gait, no tremor on outstretched hands bilaterally  Psychological: appropriate mood       RESULTS  I have personally reviewed labs and images. I also reviewed labs with patient and discussed the result and plan of care.    DXA 7/17/2013  FINDINGS: The worst lumbar spine T-score measurement is -0.7 at L2.   The right femoral neck T-score measurement is -2.0 and the left femoral neck measurement is -2.4, compared with a measurement of -1.6 on the 2011 exam. The bone density measurement left femoral neck is 0.702 gm/cm2.     IMPRESSION: Prominent osteopenia/borderline osteoporosis in the femoral neck, progressed since 2011.    DXA 8/10/2015  T-SCORES:  Lumbar Spine L1-L4 T-score: -0.5     Left Hip (Neck) T-score: -2.3  Right Hip (Neck) T-score: -1.9     Hip lowest BMD: 0.712 gm/cm2.     PERCENT CHANGE since 7/17/2013:  Lumbar " Spine: +0.1%   Femurs: +2.7%     IMPRESSION: Bilateral hip osteopenia.     DXA 12/21/2018      ASSESSMENT:    Sarah is a 68 year old female with hx of right internal carotid artery aneurysm, ocular migraine with aura, Raynaud's phenomenon, GERD presents today for osteoporosis    1) Osteoporosis: she has had long standing low bone density at least since 2013. The most recent DXA in 12/2018 showed osteoporosis particularly in the left hip. She is at risk from hip fracture.   - risk factors includes prolonged use of proton pump inhibitor, family hx of osteoporosis, intermittent use of steroid  - discussed indicator for pharmacologic treatment including bisphosphonate vs. Denosumab. Risk and benefit discussed  - she opts for zoledronic acid infusion at this time given hx of GERD  - she will continue to optimize calcium from dairy and vitamin D supplement  - she will continue with regular weight bear exercise -- walking    PLAN:   - lab today for calcium, vitamin D, Cr, PTH, phosphorus, TTG (celiac test)  - plan for Reclast infusion  - repeat DXA in 1 year after Reclast infusion and return visit afterward      Magdalena Davis MD

## 2019-02-22 NOTE — TELEPHONE ENCOUNTER
----- Message from Magdalena Davis MD sent at 2/22/2019 10:57 AM CST -----  Regarding: Reclast infusion  Hello,    I order for Reclast infusion. Could you please send to PA team to check on coverage? If approved, please let me know.    Thanks,  Magdalena

## 2019-02-22 NOTE — PATIENT INSTRUCTIONS
Lab today  Will set up Reclast infusion. My nurse will contact you once PA approval  Return in 1 year after bone density done    If you have any questions, please do not hesitate to call [phone] and ask for Endocrinology clinic.      After clinic hours, please contact [phone] and ask for Endocrinologist-on call    Sincerely,    Magdalena Davis MD  Endocrinology      CALCIUM Recommendation 1200 mg/day in divided dose of no more than 500 mg/dose. This includes what is in your food and also what is in any supplements you are taking.      Dietary sources of calcium: These also contain vitamin D  Milk / buttermilk              8 oz            300 mg  Dry milk powder       5 Tbsp             300 mg  Yogurt                          1 cup           400 mg  Ice cream                    1/2 cup          100 mg  Hard cheese                     1.5 oz          300 mg  Cottage cheese                1/2 cup        65 mg  Spinach, cooked          1 cup              240 mg  Tofu, soft regular           4 oz          120 to 390 mg  Sardines                       3 oz               370 mg  Mackerel canned         3 oz                250 mg  Canned salmon with bones 3 oz        170-210 mg  Calcium fortified cereals are available  SILK soy and almond milk products are calcium fortified  Orange juice  Fortified with Calcium       8 oz            300 mg  Low fat dairy sources are recommended      Recommended exercise goals:    30 minutes of moderate exercise on most days of the week .  Weight bearing exercise (ie, walking, jogging, hiking, dancing)    Strength training 2 or more times/week in addition to other weight -being exercise.  Strength training -- uses weight or resistance beyond that seen in everyday activities -(pilates, weight training with free weights, weight machines or resistance bands)

## 2019-02-25 LAB
TTG IGA SER-ACNC: <1 U/ML
TTG IGG SER-ACNC: <1 U/ML

## 2019-02-25 NOTE — TELEPHONE ENCOUNTER
Pt called about her appt last week with Dr. Nichols and the Reclast infusion.  Pt has questions about where and how to get the infusion set up, etc.  Please follow up with pt.  Thank you!

## 2019-03-01 ENCOUNTER — OFFICE VISIT (OUTPATIENT)
Dept: AUDIOLOGY | Facility: CLINIC | Age: 69
End: 2019-03-01
Payer: COMMERCIAL

## 2019-03-01 DIAGNOSIS — R42 DIZZINESS AND GIDDINESS: Primary | ICD-10-CM
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
## 2019-03-14 ENCOUNTER — OFFICE VISIT (OUTPATIENT)
Dept: FAMILY MEDICINE | Facility: CLINIC | Age: 69
End: 2019-03-14
Payer: COMMERCIAL

## 2019-03-14 VITALS
HEART RATE: 85 BPM | DIASTOLIC BLOOD PRESSURE: 63 MMHG | SYSTOLIC BLOOD PRESSURE: 124 MMHG | TEMPERATURE: 97.6 F | OXYGEN SATURATION: 100 % | WEIGHT: 130.8 LBS | RESPIRATION RATE: 16 BRPM | HEIGHT: 67 IN | BODY MASS INDEX: 20.53 KG/M2

## 2019-03-14 DIAGNOSIS — Z00.00 MEDICARE ANNUAL WELLNESS VISIT, INITIAL: ICD-10-CM

## 2019-03-14 DIAGNOSIS — J38.5 LARYNGOSPASM: Primary | ICD-10-CM

## 2019-03-14 RX ORDER — ALBUTEROL SULFATE 90 UG/1
2 AEROSOL, METERED RESPIRATORY (INHALATION) PRN
Qty: 8.5 G | Refills: 3 | Status: SHIPPED | OUTPATIENT
Start: 2019-03-14 | End: 2019-04-04

## 2019-03-14 ASSESSMENT — MIFFLIN-ST. JEOR: SCORE: 1147.99

## 2019-03-14 NOTE — PROGRESS NOTES
>65 year old Wellness Visit ( Medicare etc)         HPI       This 68 year old female presents as an established patient  Alyson Unger who presents for a  Annual Wellness Exam.    Other issues patient wants to address today:    yes, blood pressure review and calibration with her machine,  Slightly confused how often to do Mammograms-last done 10/6/2017, pt thought it was every 2 years, would like ophthalmology referral    Visual Acuity: :  Testing not done declined by patient    Patient Active Problem List   Diagnosis     GERD (gastroesophageal reflux disease)     CARDIOVASCULAR SCREENING; LDL GOAL LESS THAN 160     White coat syndrome without diagnosis of hypertension     Bilateral hearing loss, unspecified hearing loss type     Right internal carotid artery aneurysm     Osteopenia of left hip     Age-related osteoporosis without current pathological fracture       Past Medical History:   Diagnosis Date     Cerebral aneurysm 2017     GERD (gastroesophageal reflux disease)      Lichen planus      Migraine     with auora     Osteopenia 2010        Family History   Problem Relation Age of Onset     Cerebrovascular Disease Father          age 85     Other - See Comments Mother          58 scleroderma     Heart Failure Maternal Grandfather      Diabetes Maternal Grandmother      Cancer Paternal Grandmother          70 endometr ca     Breast Cancer Paternal Aunt          Problem List, Family History and past Medical History reviewed and unchanged/updated.       Health risk Assessment/ Review of Systems:       Constitutional:   Fevers or night sweats?  NO      Eyes:   Vision problems?  NO            Hearing:  Do you feel you have hearing loss?  YES-SEEING AUDIOLOGY  Cardiovascular:  Chest pain, palpitations, or pain with walking?  NO        Respiratory:   Breathing problems or cough?  NO    :   Difficulty controlling urination?  NO        Musculoskeletal:   Stiffness or sore joints?  NO            Skin:    concerning lesions or moles?  NO           Nervous System:   loss of strength or sensation,  numbness or tingling,  tremor,  dizziness,  headache?  NO         Mental Health:   depression or anxiety,  sleep problems?  NO   Cognition:  Memory problems?  NO       Weight Loss: Have you lost 10 or more pounds unintentionally in the previous year?  NO  Energy: How much of the time during the past 3 weeks did you feel tired?   (check one of the following):   ?  All of the time  ? Most of the time  ? Some of the time  ? A little of the time  X None of the Time    PHQ-2 Score:   PHQ-2 (  [employer]) 3/14/2019 2018   Q1: Little interest or pleasure in doing things 0 0   Q2: Feeling down, depressed or hopeless 0 0   PHQ-2 Score 0 0       PHQ-9 Score:   No flowsheet data found.  Medical Care:     What other specialists or organizations are involved in your medical care?  Neurology, neurosurgery and ENT   Patient Care Team       Relationship Specialty Notifications Start End    Alyson Unger MD PCP - General Family Practice  18     Phone: [phone] Fax: [phone]          [address] [facility] [address] [facility] [address]    Joselito Rebolledo MD MD Family Medicine - Sports Medicine  4/16/15     Phone: [phone] Fax: [phone]         [address] [facility] [address]    Magdalena Davis MD MD Internal Medicine  19     Phone: [phone] Fax: [phone]         [address] [place] [address] [address] [facility] [address]        Do you have an advanced directive? yes      Social History / Home Safety     Social History     Tobacco Use     Smoking status: Former Smoker     Types: Cigarettes     Last attempt to quit: 1983     Years since quittin.2     Smokeless tobacco: Never Used   Substance Use Topics     Alcohol use: Yes     Alcohol/week: 0.6 - 1.2 oz     Types: 1 - 2 Glasses of wine per week     Comment: EVERY 2 MONTHS     Marital Status:  Who lives in your household? , pt  and daughter  Does your home have any of the following safety concerns? Loose rugs in the hallway, no grab bars in the bathroom, no handrails on the stairs or have poorly lit areas?  No   Do you feel threatened or controlled by a partner, ex-partner or anyone in your life? {Yes No   Has anyone hurt you physically, for example by pushing, hitting, slapping or kicking you   or forcing you to have sex? No       Functional Status     Do you need help with dressing yourself, bathing, or walking?No   Do you need help with the phone, transportation, shopping, preparing meals, housework, laundry, medications or managing money?No   By yourself and not using aids, do you have any difficulty walking up 10 steps without resting? No   By yourself and not using aids, do you have any difficulty walking several hundred yards? No              Risk Behaviors and Healthy Habits     History   Smoking Status     Former Smoker     Types: Cigarettes     Quit date: 1/1/1983   Smokeless Tobacco     Never Used     How many servings of fruits and vegetables do you eat a day? 4-5  Exercise:walking  2-3 days/week for an average of 30-45 minutes  Do you frequently drive without a seatbelt? No   History   Smoking Status     Former Smoker     Types: Cigarettes     Quit date: 1/1/1983   Smokeless Tobacco     Never Used     Do you use any other drugs? No       Do you use alcohol?Yes  Number of drinking days a week very occasion (couple times a month)      Functional Ability   Was the patient's timed Up & Go test (Get up from chair walk, 10 feet turn, return to chair and sit down) unsteady or longer than 30 seconds? No     Fall risk:     1. Have you fallen two or more times in the past year? No   2. Have you fallen and had an injury in the past year?  No         Evaulation of Cognitive Function     Family member/caregiver input: Normal    1) Repeat 3 items (Leader, Season, Table)    2) Clock draw: NORMAL  3) 3 item recall: Recalls 3 objects  Results:  "3 items recalled: COGNITIVE IMPAIRMENT LESS LIKELY    Mini-CogTM Copyright S Anibal. Licensed by the author for use in [facility]; reprinted with permission ([email or web address]@.[email or web address]). All rights reserved.        Other Assessments:     CV Risk based on Pooled Cohort Risk (consider assessing every 4-6 years; consider statin in patients with 10-yr risk > 7.5%):   The ASCVD Risk score (Ynes SOTOMAYOR Jr., et al., 2013) failed to calculate for the following reasons:    Cannot find a previous HDL lab    Cannot find a previous total cholesterol lab     Lipids from 11/17 in Care Everywhere  Total 231  HDL 86    TG 90      Advance Directives: Discussed with patient and family as appropriate.  Has patient completed advance directives? Yes: Patient states has Advance Directive and will bring in a copy to clinic.      Immunization History   Administered Date(s) Administered     Flu, Unspecified 10/01/2005     Influenza (H1N1) 12/21/2009     Influenza (High Dose) 3 valent vaccine 10/15/2018     Influenza Vaccine IM 3yrs+ 4 Valent IIV4 09/18/2015     Pneumo Conj 13-V (2010&after) 08/04/2015     Pneumococcal 23 valent 09/01/2016     TDAP Vaccine (Boostrix) 02/23/2011     Td (Adult), Adsorbed 10/01/2005     Tdap (Adacel,Boostrix) 02/01/2010     Zoster vaccine, live 07/22/2013     Reviewed Immunization Record Today    Physical Exam     Vitals: /63 (BP Location: Left arm, Patient Position: Sitting, Cuff Size: Adult Regular)   Pulse 85   Temp 97.6  F (36.4  C) (Oral)   Resp 16   Ht 1.689 m (5' 6.5\")   Wt 59.3 kg (130 lb 12.8 oz)   SpO2 100%   BMI 20.80 kg/m    BMI= Body mass index is 20.8 kg/m .  GENERAL APPEARANCE: alert and no distress  HENT: ear canals patent and TM's normal; mouth without ulcers or lesions; and oral mucous membranes moist  Hearing loss screen:  Decreased on L. Working with ENT  DENTITION:  Good repair?  yes  RESP: lungs clear to auscultation - no rales, rhonchi or wheezes  CV: regular rates and " rhythm, no murmur, click or rub, no peripheral edema and peripheral pulses strong  SKIN: no suspicious lesions or rashes  NEURO: Normal strength and tone  MENTAL STATUS EXAM  Appearance: appropriate  Attitude: cooperative  Behavior: normal  Eye Contact: normal  Speech: normal  Orientation: oreinted to person , place, time and situation  Mood:  good  Affect: Mood Congruent  Thought Process: clear        Assessment and Plan       Welcome to Medicare Preventive Visit / Initial Medicare Annual Wellness Exam - reviewed Preventive Services and Plan form with patient as specified in Patient Instructions.    Sarah was seen today for medicare visit.    Diagnoses and all orders for this visit:     Medicare annual wellness visit, initial   - preventative care and recommended screening UTD     Osteoporosis       - Reclast infusions per orders         Unilateral hearing loss, tinnitus   Episodic dizziness   - Seen by Audiology, imaging done. Has appt with ENT scheduled.     Laryngospasm  -     Refill albuterol (PROAIR HFA/PROVENTIL HFA/VENTOLIN HFA) 108 (90 Base) MCG/ACT inhaler; Inhale 2 puffs into the lungs as needed               Options for treatment and follow-up care were reviewed with the Sarah العلي and/or guardian engaged in the decision making process and verbalized understanding of the options discussed and agreed with the final plan.    Alyson Unger MD

## 2019-03-14 NOTE — PATIENT INSTRUCTIONS
If you had an XRay/CT/Ultrasound/MRI ordered the number is [phone] to schedule or change your radiology appointment.   Medical Concerns:  If you have urgent medical concerns please call [phone] at any time of the day.    Alyson Unger MD  [facility] [employer]ICARE PERSONAL PREVENTIVE SERVICES PLAN - SERVICES     Review these tests with your doctor then decide which ones you want and take this page home for your reference  Immunization History   Administered Date(s) Administered     Flu, Unspecified 10/01/2005     Influenza (H1N1) 12/21/2009     Influenza (High Dose) 3 valent vaccine 10/15/2018     Influenza Vaccine IM 3yrs+ 4 Valent IIV4 09/18/2015     Pneumo Conj 13-V (2010&after) 08/04/2015     Pneumococcal 23 valent 09/01/2016     TDAP Vaccine (Boostrix) 02/23/2011     Td (Adult), Adsorbed 10/01/2005     Tdap (Adacel,Boostrix) 02/01/2010     Zoster vaccine, live 07/22/2013          IMMUNIZATIONS Description Recommend today?     Influenza (flu shot) Helps to prevent flu; you should get it every year no   PCV 13 Prevents pneumonia; given once no   PPSV 23 Prevents pneumonia; given once No; is up to date.   Tetanus Prevents tetanus; need every 10 years No; is up to date.   Herpes Zoster (shingles) Prevents or lessens symptoms from shingles; given once Yes; Recommended   Hepatitis B  If you have any of the following risk factors you should be immunized for hepatitis B: severe kidney disease, people who live in the same house as a carrier of Hepatitis B virus, people who live in  institutions (e.g. nursing homes or group homes), homosexual men, patients with hemophilia who received Factor VIII or IX concentrates, abusers of illicit injectable drugs No; is up to date.           Health Maintenance   Topic Date Due     ADVANCE DIRECTIVE PLANNING Q5 YRS  07/27/2005     ZOSTER IMMUNIZATION (2 of 3) 09/16/2013     MEDICARE ANNUAL WELLNESS VISIT  07/27/2015     MAMMO SCREEN Q2 YR (SYSTEM ASSIGNED)  08/12/2018     PHQ-2  Q1 YR  12/06/2019     FALL RISK ASSESSMENT  01/26/2020     LIPID SCREEN Q5 YR FEMALE (SYSTEM ASSIGNED)  08/10/2020     DTAP/TDAP/TD IMMUNIZATION (3 - Td) 02/23/2021     COLON CANCER SCREEN (SYSTEM ASSIGNED)  03/19/2028     DEXA SCAN SCREENING (SYSTEM ASSIGNED)  Completed     INFLUENZA VACCINE  Completed     PNEUMOCOCCAL IMMUNIZATION 65+ LOW/MEDIUM RISK  Completed     HEPATITIS C SCREENING  Completed     IPV IMMUNIZATION  Aged Out     MENINGITIS IMMUNIZATION  Aged Out       SCREENING TESTS     Description   Year of Last Screening   Recommended Today?   Heart disease screening blood tests    Cholesterol level Reducing cholesterol can reduce your risk of heart attacks by 25%.  Screening is recommended yearly if you are at risk of heart disease otherwise every 4-5 years  No; is up to date.   Diabetes screening tests    Hemoglobin A1c blood test   Finding and treating diabetes early can reduce complications.  Screening recommended/covered yearly if you have high blood pressure, high cholesterol, obesity (BMI >30), or a history of high blood glucose tests; or 2 of the following: family history of diabetes, overweight (BMI >25 but <30), age 65 years or older, and a history of diabetes of pregnancy or gave birth to baby weighing more than 9 lbs.  No; is up to date.   Hepatitis B screening Finding hepatitis B early can reduce complications.  Screening is recommended for persons with selected risk factors.  No; is up to date.   Hepatitis C screening Finding hepatitis C early can reduce complications.  Screening is recommended for all persons born from 1945 through 1965 and for those with selected other risk factors.   No; is up to date.   HIV screening Finding HIV early can reduce complications.  Screening is recommended for persons with risk factors for HIV infection.  No; is up to date.   Glaucoma screening Early detection of glaucoma can prevent blindness.   Please talk to your eye doctor about this.       Health  Maintenance   Topic Date Due     ADVANCE DIRECTIVE PLANNING Q5 YRS  07/27/2005     ZOSTER IMMUNIZATION (2 of 3) 09/16/2013     MEDICARE ANNUAL WELLNESS VISIT  07/27/2015     MAMMO SCREEN Q2 YR (SYSTEM ASSIGNED)  08/12/2018     PHQ-2 Q1 YR  12/06/2019     FALL RISK ASSESSMENT  01/26/2020     LIPID SCREEN Q5 YR FEMALE (SYSTEM ASSIGNED)  08/10/2020     DTAP/TDAP/TD IMMUNIZATION (3 - Td) 02/23/2021     COLON CANCER SCREEN (SYSTEM ASSIGNED)  03/19/2028     DEXA SCAN SCREENING (SYSTEM ASSIGNED)  Completed     INFLUENZA VACCINE  Completed     PNEUMOCOCCAL IMMUNIZATION 65+ LOW/MEDIUM RISK  Completed     HEPATITIS C SCREENING  Completed     IPV IMMUNIZATION  Aged Out     MENINGITIS IMMUNIZATION  Aged Out   }      SCREENING TESTS     Description   Year of Last Screening   Recommended Today?   Colorectal cancer screening    Fecal occult blood test     Screening colonoscopy Screening for colon cancer has been shown to reduce death from colon cancer by 25-30%. Screening recommended to start at 50 years and continuing until age 75 years.    No; is up to date.   Breast Cancer Screening (women)    Mammogram Mammogram screening for breast cancer has been shown to reduce the risk of dying from breast cancer and prolong life. Screening is recommended every 1-2 years for women aged 50 to 74 years.   No; is up to date.   Cervical Cancer screening (women)    Pap Cervical pap smears can reduce cervical cancer. Screening is recommended annually if high risk (history of abnormal pap smears) otherwise every 2-3 years, stop screening at 65 years of age if history of normal paps.  No; is up to date.   Screening for Osteoporosis:  Bone mass measurements (women)    Dexa Scan Screening and treating Osteoporosis can reduce the risk of hip and spine fractures. Screening is recommended in women 65 years or older and in women and men at risk of osteoporosis.  No; is up to date.   Screening for Lung Cancer     Low-dose CT scanning Screening can reduce  mortality in persons aged 55-80 who have smoked at least 30 pack-years and who are either still smoking or have quit in the past 15 years.  No: is not indicated today.   Abdominal Aortic Aneurysm (AAA) screening    Ultrasound (US)   An aneurysm treated before rupture is very safe -a ruptured aneurysm can be fatal.  Screening  by US for AAA is limited to patients who meet one of the following criteria:    Men who are 65-75 years old and have smoked more than 100 cigarettes in their lifetime    Anyone with a family history of abdominal aortic aneurysm  No: is not indicated today.       MEDICARE WELLNESS EXAM PATIENT INSTRUCTIONS    Yearly exam:     See your health care provider every year in order to review changes in your health, review medicines that you take, and discuss preventive care needs such as immunizations and cancer screening.    Get a flu shot each year.     Advance Directive:    If you have not done so, you are encouraged to complete an advance directive. If you would like support with this, please contact the clinic  through the main clinic line. More information about advance directives can be found at:     [email or web address]    Nutrition:     Eat at least 5 servings of fruits and vegetables each day.     Eat whole-grain bread, whole-wheat pasta and brown rice instead of white grains and rice.     Talk to your doctor about Calcium and Vitamin D.     Lifestyle:    Exercise for at least 150 minutes a week (30 minutes a day, 5 days a week). This will help you control your weight and prevent disease.     Limit alcohol to one drink per day.     If you smoke, try to quit - your doctor will be happy to help.     Wear sunscreen to prevent skin cancer.     See your dentist every six months for an exam and cleaning.     See your eye doctor every 1 to 2 years to screen for conditions such as glaucoma, macular degeneration and cataracts.

## 2019-03-22 NOTE — TELEPHONE ENCOUNTER
FUTURE VISIT INFORMATION      FUTURE VISIT INFORMATION:    Date: 3/26/19    Time: 1:30PM (audio)/ 2:30PM ([place])    [place] [facility] [place]:    Referring provider:  Dr Abelardo Valadez    Referring providers clinic[place]  [facility] [place] [facility]    Reason for visit/diagnosis  Dizziness     RECORDS REQUESTED FROM:       Clinic name Comments Records Status Imaging Status   [facility]  12/21/18 Audio with Sammie Smart EPIC     [facility] 12/10/18 MRI Brain Angio Report: scanned in [facility]     ENT Specialty Care 4/12/18 Audio      4/4/16 notes with Dr Roque Cruz  Scanned in [facility] Miguelina   2/26/18 telephone encounter with Dr Brittaney Tompkins     2/8/18 notes with Dr Tompkins    Care Everywhere       [facility] [place] [facility] [place]19 notes with Dr Valadez [facility]                        [facility] 1/26/19 CT Temporal bone  12/15/17 CT Angio Neck  [facility] PACS

## 2019-03-25 DIAGNOSIS — H91.90 HEARING LOSS, UNSPECIFIED HEARING LOSS TYPE, UNSPECIFIED LATERALITY: Primary | ICD-10-CM

## 2019-03-26 ENCOUNTER — PRE VISIT (OUTPATIENT)
Dept: OTOLARYNGOLOGY | Facility: CLINIC | Age: 69
End: 2019-03-26

## 2019-03-26 ENCOUNTER — OFFICE VISIT (OUTPATIENT)
Dept: OTOLARYNGOLOGY | Facility: CLINIC | Age: 69
End: 2019-03-26
Payer: COMMERCIAL

## 2019-03-26 ENCOUNTER — OFFICE VISIT (OUTPATIENT)
Dept: AUDIOLOGY | Facility: CLINIC | Age: 69
End: 2019-03-26
Attending: OTOLARYNGOLOGY
Payer: COMMERCIAL

## 2019-03-26 VITALS
DIASTOLIC BLOOD PRESSURE: 72 MMHG | WEIGHT: 131 LBS | HEART RATE: 70 BPM | RESPIRATION RATE: 13 BRPM | SYSTOLIC BLOOD PRESSURE: 153 MMHG | BODY MASS INDEX: 21.83 KG/M2 | HEIGHT: 65 IN

## 2019-03-26 DIAGNOSIS — H90.3 SENSORINEURAL HEARING LOSS, BILATERAL: Primary | ICD-10-CM

## 2019-03-26 DIAGNOSIS — H91.90 HEARING LOSS, UNSPECIFIED HEARING LOSS TYPE, UNSPECIFIED LATERALITY: ICD-10-CM

## 2019-03-26 ASSESSMENT — MIFFLIN-ST. JEOR: SCORE: 1130.7

## 2019-03-26 ASSESSMENT — PAIN SCALES - GENERAL: PAINLEVEL: NO PAIN (0)

## 2019-03-26 NOTE — PROGRESS NOTES
"Dear Alyson Milner:    I had the pleasure of meeting Sarah العلي in consultation today at the [facility] at your request.    CHIEF COMPLAINT: Right hearing loss    HISTORY OF PRESENT ILLNESS: Patient is a 68-year-old in today for neuro-otologic consultation on her right ear and hearing loss.  She noticed both ears were plugged and felt full after a flight last February.  Left seemed open but the right never did.  An audiogram at that time showed a mild right sensorineural hearing loss maintaining good discrimination.  In November she suddenly noticed she could not hear when having a conversation with her son from the right side.  She subsequently was found to have a severe right sensorineural hearing loss, mainly with severe loss of discrimination level.  She really has not had any dizziness.  She denies any pain or drainage in her ears.  Further denies any dysphagia, hoarseness, facial paresthesias.  She has had a recent full workup on the year including ENG, VEMP, ECOG, and CT scan.  All of which were normal.    ALLERGIES:    Allergies   Allergen Reactions     Atorvastatin Rash     Nickel Rash     Other reaction(s): *Unknown  Other reaction(s): *Unknown       HABITS: Social History    Substance and Sexual Activity      Alcohol use: Yes        Alcohol/week: 0.6 - 1.2 oz        Comment: Minimal 1 glass wine approx. 2x/month     History   Smoking Status     Former Smoker     Packs/day: 0.50     Years: 12.00     Types: Cigarettes     Start date: 9/1/1968     Quit date: 1/1/1980   Smokeless Tobacco     Never Used         PAST MEDICAL HISTORY: Please see today's intake form (for the remainder of the PMH) which I reviewed and signed.  Past Medical History:   Diagnosis Date     Benign positional vertigo decades     Cerebral aneurysm 12/2017     GERD (gastroesophageal reflux disease)      Hearing problem '04 & \"18    SNHL mild L ear;moderate-severe R ear  >75% loss word compre     " Lichen planus      Migraine     with auora     Migraines     Ocular migraine/ Migraine with Aura     Osteopenia 2010     Sensorineural hearing loss  &  worsened     Tinnitus 2018    R ear only       FAMILY HISTORY/SOCIAL HISTORY:   Family History   Problem Relation Age of Onset     Cerebrovascular Disease Father          CVA 2006     Other - See Comments Mother          58 scleroderma     Heart Failure Maternal Grandfather      Heart Disease Maternal Grandfather         CHF     Diabetes Maternal Grandmother         Type 1     Cancer Paternal Grandmother         Endometrial CA of uterus     Breast Cancer Paternal Aunt      Diabetes Cousin         Type 1      Social History     Socioeconomic History     Marital status:      Spouse name: Not on file     Number of children: Not on file     Years of education: Not on file     Highest education level: Not on file   Occupational History     Occupation: RN     Comment: [employer]   Social Needs     Financial resource strain: Not on file     Food insecurity:     Worry: Not on file     Inability: Not on file     Transportation needs:     Medical: Not on file     Non-medical: Not on file   Tobacco Use     Smoking status: Former Smoker     Packs/day: 0.50     Years: 12.00     Pack years: 6.00     Types: Cigarettes     Start date: 1968     Last attempt to quit: 1980     Years since quittin.2     Smokeless tobacco: Never Used   Substance and Sexual Activity     Alcohol use: Yes     Alcohol/week: 0.6 - 1.2 oz     Comment: Minimal 1 glass wine approx. 2x/month     Drug use: No     Sexual activity: Yes     Partners: Male   Lifestyle     Physical activity:     Days per week: Not on file     Minutes per session: Not on file     Stress: Not on file   Relationships     Social connections:     Talks on phone: Not on file     Gets together: Not on file     Attends Yazdanism service: Not on file     Active member of club or  organization: Not on file     Attends meetings of clubs or organizations: Not on file     Relationship status: Not on file     Intimate partner violence:     Fear of current or ex partner: Not on file     Emotionally abused: Not on file     Physically abused: Not on file     Forced sexual activity: Not on file   Other Topics Concern     Not on file   Social History Narrative    Semi retired, 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 3 x's    How much calcium per day? diet       How much caffeine per day? 2-3 cups    How much vitamin D per day? 2000 iu    Do you/your family wear seatbelts?  Yes    Do you/your family use safety helmets? n/a    Do you/your family use sunscreen? Yes    Do you/your family keep firearms in the home? No    Do you/your family have a smoke detector(s)? Yes        Do you feel safe in your home? Yes    Has anyone ever touched you in an unwanted manner? No     Explain         August 4, 2015 Alina Grant LPN               REVIEW OF SYSTEMS: Patient Supplied Answers to Review of Systems   ENT ROS 3/23/2019   Neurology -   Ears, Nose, Throat Hearing loss, Ringing/noise in ears, Nasal congestion or drainage   Gastrointestinal/Genitourinary -   Endocrine -       The remainder of the 10 point ROS is negative    PHYSICIAL EXAMINATION:  Constitutional: The patient was well-groomed and in no acute distress.   Skin: Warm and pink.  Psychiatric: The patient's affect was calm, cooperative, and appropriate.   Respiratory: Breathing comfortably without stridor or exertion of accessory muscles.  Eyes: Pupils were equal and reactive. Extraocular movement intact.   Head: Normocephalic and atraumatic. No lesions or scars.  Ears: Patient placed under the microscope for microscopic evaluation and cleaning of cerumen which was obscuring full visualization of both TMs. Under high power magnification, the right ear was  examined and cleaned of cerumen using curet, alligator forceps, and suction.  After cleaning, TM is fully visualized and has normal position with normal middle ear aeration. The left ear was then cleaned and inspected using microscope, instruments and similar techniques. After cleaning of cerumen, the TM has normal position with normal aeration to middle ear.  Nose: Sinuses were nontender. Anterior rhinoscopy revealed midline septum and absence of purulence or polyps.  Oral Cavity: Normal tongue, floor of mouth, buccal mucosa, and palate. No lesions or masses on inspection or palpation. No abnormal lymph tissue in the oropharynx.   Neck: The parotid is soft without masses. Supple with normal laryngeal and tracheal landmarks.   Lymphatic: There is no palpable lymphadenopathy or other masses in the neck.   Neurologic: Alert and oriented x 3. Cranial nerves III-XI within normal limits. Voice quality normal.  Cerebellar Function Tests:  Grossly normal    Audiogram: Audiogram performed shows mild mid frequency sensorineural hearing loss in the left ear and a moderate sensorineural hearing loss through all frequencies in the right.  She only has 24% discrimination in the right ear, 100% on the left.      IMPRESSION AND PLAN:   1. Severe right sensorineural hearing loss: Discussed this with her, with all her normal studies, most commonly with consider a right viral cause with the sudden sensorineural hearing loss syndrome.  Discussed this in detail.  Optimally would obtain an MRI scan but her CT scan review was normal and she prefers not to have gadolinium.  I did review MRAs which were done a year ago and has some visualization of the IAC, I do not see any enlargement of the right 8th nerve on the somewhat limited views.  This, along with a normal CT scan, along with her age, we discussed if any retrocochlear pathology found it was quite small and would like to monitor.  She again prefers just to monitor.  We discussed  options for hearing aids which she will pursue at her discretion.  Also would recommend follow-up in 6 months and then yearly to be sure left ear remains stable.    Thank you very much for the opportunity to participate in the care of your patient.    Rick L Nissen MD

## 2019-03-26 NOTE — NURSING NOTE
"Chief Complaint   Patient presents with     RECHECK     Dizziness, sensorineural hearing loss      Blood pressure 153/72, pulse 70, resp. rate 13, height 1.66 m (5' 5.35\"), weight 59.4 kg (131 lb), not currently breastfeeding.    Fly Quesada LPN    "

## 2019-03-26 NOTE — PROGRESS NOTES
AUDIOLOGY REPORT    SUMMARY: Audiology visit completed. See audiogram for results.      RECOMMENDATIONS: Follow-up with ENT.      You Aranda.  Licensed Audiologist  [place] #[id number]

## 2019-03-26 NOTE — LETTER
3/26/2019       RE: Sarah العلي  [address]  [place] [address]     Dear Colleague,    Thank you for referring your patient, Sarah العلي, to the [facility] at [facility]. Please see a copy of my visit note below.    Dear Alyson Milner:    I had the pleasure of meeting Sarah العلي in consultation today at the [facility] at your request.    CHIEF COMPLAINT: Right hearing loss    HISTORY OF PRESENT ILLNESS: Patient is a 68-year-old in today for neuro-otologic consultation on her right ear and hearing loss.  She noticed both ears were plugged and felt full after a flight last February.  Left seemed open but the right never did.  An audiogram at that time showed a mild right sensorineural hearing loss maintaining good discrimination.  In November she suddenly noticed she could not hear when having a conversation with her son from the right side.  She subsequently was found to have a severe right sensorineural hearing loss, mainly with severe loss of discrimination level.  She really has not had any dizziness.  She denies any pain or drainage in her ears.  Further denies any dysphagia, hoarseness, facial paresthesias.  She has had a recent full workup on the year including ENG, VEMP, ECOG, and CT scan.  All of which were normal.    ALLERGIES:    Allergies   Allergen Reactions     Atorvastatin Rash     Nickel Rash     Other reaction(s): *Unknown  Other reaction(s): *Unknown       HABITS: Social History    Substance and Sexual Activity      Alcohol use: Yes        Alcohol/week: 0.6 - 1.2 oz        Comment: Minimal 1 glass wine approx. 2x/month     History   Smoking Status     Former Smoker     Packs/day: 0.50     Years: 12.00     Types: Cigarettes     Start date: 9/1/1968     Quit date: 1/1/1980   Smokeless Tobacco     Never Used         PAST MEDICAL HISTORY: Please see today's intake form (for the  "remainder of the PMH) which I reviewed and signed.  Past Medical History:   Diagnosis Date     Benign positional vertigo decades     Cerebral aneurysm 2017     GERD (gastroesophageal reflux disease)      Hearing problem  & \"    SNHL mild L ear;moderate-severe R ear  >75% loss word compre     Lichen planus      Migraine     with auora     Migraines     Ocular migraine/ Migraine with Aura     Osteopenia 2010     Sensorineural hearing loss  &  worsened     Tinnitus 2018    R ear only       FAMILY HISTORY/SOCIAL HISTORY:   Family History   Problem Relation Age of Onset     Cerebrovascular Disease Father          CVA      Other - See Comments Mother          58 scleroderma     Heart Failure Maternal Grandfather      Heart Disease Maternal Grandfather         CHF     Diabetes Maternal Grandmother         Type 1     Cancer Paternal Grandmother         Endometrial CA of uterus     Breast Cancer Paternal Aunt      Diabetes Cousin         Type 1      Social History     Socioeconomic History     Marital status:      Spouse name: Not on file     Number of children: Not on file     Years of education: Not on file     Highest education level: Not on file   Occupational History     Occupation: RN     Comment: [employer]   Social Needs     Financial resource strain: Not on file     Food insecurity:     Worry: Not on file     Inability: Not on file     Transportation needs:     Medical: Not on file     Non-medical: Not on file   Tobacco Use     Smoking status: Former Smoker     Packs/day: 0.50     Years: 12.00     Pack years: 6.00     Types: Cigarettes     Start date: 1968     Last attempt to quit: 1980     Years since quittin.2     Smokeless tobacco: Never Used   Substance and Sexual Activity     Alcohol use: Yes     Alcohol/week: 0.6 - 1.2 oz     Comment: Minimal 1 glass wine approx. 2x/month     Drug use: No     Sexual activity: Yes     Partners: Male "   Lifestyle     Physical activity:     Days per week: Not on file     Minutes per session: Not on file     Stress: Not on file   Relationships     Social connections:     Talks on phone: Not on file     Gets together: Not on file     Attends Anabaptism service: Not on file     Active member of club or organization: Not on file     Attends meetings of clubs or organizations: Not on file     Relationship status: Not on file     Intimate partner violence:     Fear of current or ex partner: Not on file     Emotionally abused: Not on file     Physically abused: Not on file     Forced sexual activity: Not on file   Other Topics Concern     Not on file   Social History Narrative    Semi retired, 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 3 x's    How much calcium per day? diet       How much caffeine per day? 2-3 cups    How much vitamin D per day? 2000 iu    Do you/your family wear seatbelts?  Yes    Do you/your family use safety helmets? n/a    Do you/your family use sunscreen? Yes    Do you/your family keep firearms in the home? No    Do you/your family have a smoke detector(s)? Yes        Do you feel safe in your home? Yes    Has anyone ever touched you in an unwanted manner? No     Explain         August 4, 2015 Alina Grant LPN               REVIEW OF SYSTEMS: Patient Supplied Answers to Review of Systems   ENT ROS 3/23/2019   Neurology -   Ears, Nose, Throat Hearing loss, Ringing/noise in ears, Nasal congestion or drainage   Gastrointestinal/Genitourinary -   Endocrine -       The remainder of the 10 point ROS is negative    PHYSICIAL EXAMINATION:  Constitutional: The patient was well-groomed and in no acute distress.   Skin: Warm and pink.  Psychiatric: The patient's affect was calm, cooperative, and appropriate.   Respiratory: Breathing comfortably without stridor or exertion of accessory muscles.  Eyes: Pupils were  equal and reactive. Extraocular movement intact.   Head: Normocephalic and atraumatic. No lesions or scars.  Ears: Patient placed under the microscope for microscopic evaluation and cleaning of cerumen which was obscuring full visualization of both TMs. Under high power magnification, the right ear was examined and cleaned of cerumen using curet, alligator forceps, and suction.  After cleaning, TM is fully visualized and has normal position with normal middle ear aeration. The left ear was then cleaned and inspected using microscope, instruments and similar techniques. After cleaning of cerumen, the TM has normal position with normal aeration to middle ear.  Nose: Sinuses were nontender. Anterior rhinoscopy revealed midline septum and absence of purulence or polyps.  Oral Cavity: Normal tongue, floor of mouth, buccal mucosa, and palate. No lesions or masses on inspection or palpation. No abnormal lymph tissue in the oropharynx.   Neck: The parotid is soft without masses. Supple with normal laryngeal and tracheal landmarks.   Lymphatic: There is no palpable lymphadenopathy or other masses in the neck.   Neurologic: Alert and oriented x 3. Cranial nerves III-XI within normal limits. Voice quality normal.  Cerebellar Function Tests:  Grossly normal    Audiogram: Audiogram performed shows mild mid frequency sensorineural hearing loss in the left ear and a moderate sensorineural hearing loss through all frequencies in the right.  She only has 24% discrimination in the right ear, 100% on the left.      IMPRESSION AND PLAN:   1. Severe right sensorineural hearing loss: Discussed this with her, with all her normal studies, most commonly with consider a right viral cause with the sudden sensorineural hearing loss syndrome.  Discussed this in detail.  Optimally would obtain an MRI scan but her CT scan review was normal and she prefers not to have gadolinium.  I did review MRAs which were done a year ago and has some  visualization of the IAC, I do not see any enlargement of the right 8th nerve on the somewhat limited views.  This, along with a normal CT scan, along with her age, we discussed if any retrocochlear pathology found it was quite small and would like to monitor.  She again prefers just to monitor.  We discussed options for hearing aids which she will pursue at her discretion.  Also would recommend follow-up in 6 months and then yearly to be sure left ear remains stable.    Thank you very much for the opportunity to participate in the care of your patient.    Rick L Nissen MD

## 2019-03-26 NOTE — PATIENT INSTRUCTIONS
1.  You were seen in the ENT Clinic today by Dr. Nissen.  If you have any questions or concerns after your appointment, please call [phone]. Press option #1 for scheduling related needs. Press option #3 for Nurse advice.    2.  Plan is to return to clinic in 6 months with an Audiogram and Tympanogram prior to appointment, or sooner with any concerns.      Dolores Fitch LPN  [facility]  [phone]

## 2019-04-03 DIAGNOSIS — J38.5 LARYNGOSPASM: ICD-10-CM

## 2019-04-03 NOTE — TELEPHONE ENCOUNTER

## 2019-04-04 ENCOUNTER — TELEPHONE (OUTPATIENT)
Dept: FAMILY MEDICINE | Facility: CLINIC | Age: 69
End: 2019-04-04

## 2019-04-04 RX ORDER — ALBUTEROL SULFATE 90 UG/1
2 AEROSOL, METERED RESPIRATORY (INHALATION) PRN
Qty: 8.5 G | Refills: 3 | Status: SHIPPED | OUTPATIENT
Start: 2019-04-04 | End: 2023-05-10

## 2019-04-22 ENCOUNTER — INFUSION THERAPY VISIT (OUTPATIENT)
Dept: INFUSION THERAPY | Facility: CLINIC | Age: 69
End: 2019-04-22
Attending: INTERNAL MEDICINE
Payer: COMMERCIAL

## 2019-04-22 ENCOUNTER — APPOINTMENT (OUTPATIENT)
Dept: LAB | Facility: CLINIC | Age: 69
End: 2019-04-22
Attending: INTERNAL MEDICINE
Payer: COMMERCIAL

## 2019-04-22 VITALS
SYSTOLIC BLOOD PRESSURE: 110 MMHG | DIASTOLIC BLOOD PRESSURE: 68 MMHG | BODY MASS INDEX: 21.7 KG/M2 | TEMPERATURE: 96 F | WEIGHT: 131.8 LBS | RESPIRATION RATE: 16 BRPM | HEART RATE: 60 BPM | OXYGEN SATURATION: 100 %

## 2019-04-22 DIAGNOSIS — K21.9 GASTROESOPHAGEAL REFLUX DISEASE, ESOPHAGITIS PRESENCE NOT SPECIFIED: Primary | ICD-10-CM

## 2019-04-22 DIAGNOSIS — M81.0 AGE-RELATED OSTEOPOROSIS WITHOUT CURRENT PATHOLOGICAL FRACTURE: ICD-10-CM

## 2019-04-22 LAB
CALCIUM SERPL-MCNC: 9.7 MG/DL (ref 8.5–10.1)
CREAT SERPL-MCNC: 0.73 MG/DL (ref 0.52–1.04)
GFR SERPL CREATININE-BSD FRML MDRD: 85 ML/MIN/{1.73_M2}

## 2019-04-22 PROCEDURE — 82310 ASSAY OF CALCIUM: CPT | Performed by: INTERNAL MEDICINE

## 2019-04-22 PROCEDURE — 96365 THER/PROPH/DIAG IV INF INIT: CPT

## 2019-04-22 PROCEDURE — 25000128 H RX IP 250 OP 636: Mod: ZF | Performed by: INTERNAL MEDICINE

## 2019-04-22 PROCEDURE — 82565 ASSAY OF CREATININE: CPT | Performed by: INTERNAL MEDICINE

## 2019-04-22 RX ORDER — ZOLEDRONIC ACID 5 MG/100ML
5 INJECTION, SOLUTION INTRAVENOUS ONCE
Status: CANCELLED
Start: 2019-04-22

## 2019-04-22 RX ORDER — ZOLEDRONIC ACID 5 MG/100ML
5 INJECTION, SOLUTION INTRAVENOUS ONCE
Status: COMPLETED | OUTPATIENT
Start: 2019-04-22 | End: 2019-04-22

## 2019-04-22 RX ADMIN — ZOLEDRONIC ACID 5 MG: 0.05 INJECTION, SOLUTION INTRAVENOUS at 13:14

## 2019-04-22 ASSESSMENT — PAIN SCALES - GENERAL: PAINLEVEL: NO PAIN (0)

## 2019-04-22 NOTE — PROGRESS NOTES
Nursing Note  Sarah العلي presents today to [facility] for:   Chief Complaint   Patient presents with     Blood Draw     labs drawn with piv start by rn.  vs taken     Infusion     Reclast     During today's [facility] appointment, orders from Dr. Magdalena Tomas were completed.  Frequency: once    Progress note:  Patient identification verified by name and date of birth.  Assessment completed.  Vitals recorded in Doc Flowsheets.  Patient was provided with education regarding infusion and possible side effects.  Patient verbalized understanding.     present during visit today: Not Applicable.    Premedications: were not ordered.    Infusion length and rate:  infusion given over approximately 15 minutes.    Labs: were drawn prior to appointment on 4/22/19 during lab appointment.    Vascular access: peripheral IV was placed prior to appointment at [facility] on 4/22/19 during lab.    Treatment Conditions: patient's Creatinine Clearance and Calcium are within paramaters to administer medication per orders.    Post Infusion Assessment:  Patient tolerated infusion without incident.  Site patent and intact, free from redness, edema or discomfort.  No evidence of extravasations.  Access discontinued per protocol.     Discharge Plan:   Follow up plan of care with: ongoing infusions at [facility].  Discharge instructions were reviewed with patient.  Patient/representative verbalized understanding of discharge instructions and all questions answered.  Patient discharged from [facility] in stable condition.    Ericka Severson, RN    Administrations This Visit     zoledronic Acid (RECLAST) infusion 5 mg     Admin Date  04/22/2019 Action  New Bag Dose  5 mg Rate  400 mL/hr Route  Intravenous Administered By  Severson, Ericka, RN                /59 (BP Location:  Right arm, Patient Position: Sitting, Cuff Size: Adult Regular)   Pulse 66   Temp 96  F (35.6  C) (Oral)   Resp 16   Wt 59.8 kg (131 lb 12.8 oz)   SpO2 100%   BMI 21.70 kg/m

## 2019-04-22 NOTE — NURSING NOTE
Chief Complaint   Patient presents with     Blood Draw     labs drawn with piv start by rn.  vs taken     Labs drawn with PIV start by rn.  Pt tolerated well.  VS taken and pt checked in for next appt.  Annette Banks RN

## 2019-04-22 NOTE — PATIENT INSTRUCTIONS
Dear Sarah العلي    Thank you for choosing [facility] ([facility]) for your infusion.  The following information is a summary of our appointment as well as important reminders.        We look forward in seeing you on your next appointment here at [facility].  Please don t hesitate to call us at [phone] to reschedule any of your appointments or to speak with one of the [facility] registered nurses.  It was a pleasure taking care of you today.    Sincerely,    [facility]  [address] [place] [address]  [address]  Phone:  [phone]    Patient Education     Patient Education    Zoledronic Acid Solution for injection    Zoledronic Acid Solution for injection [Hypercalcemia of Malignancy]    Zoledronic Acid Solution for injection [Pagets Disease]  Zoledronic Acid Solution for injection  What is this medicine?  ZOLEDRONIC ACID (CARA le dron ik AS id) lowers the amount of calcium loss from bone. It is used to treat Paget's disease and osteoporosis in women.  This medicine may be used for other purposes; ask your health care provider or pharmacist if you have questions.  What should I tell my health care provider before I take this medicine?  They need to know if you have any of these conditions:    aspirin-sensitive asthma    cancer, especially if you are receiving medicines used to treat cancer    dental disease or wear dentures    infection    kidney disease    low levels of calcium in the blood    past surgery on the parathyroid gland or intestines    receiving corticosteroids like dexamethasone or prednisone    an unusual or allergic reaction to zoledronic acid, other medicines, foods, dyes, or preservatives    pregnant or trying to get pregnant    breast-feeding  How should I use this medicine?  This medicine is for infusion into a vein. It is given by a health care professional in a hospital  or clinic setting.  Talk to your pediatrician regarding the use of this medicine in children. This medicine is not approved for use in children.  Overdosage: If you think you have taken too much of this medicine contact a poison control center or emergency room at once.  NOTE: This medicine is only for you. Do not share this medicine with others.  What if I miss a dose?  It is important not to miss your dose. Call your doctor or health care professional if you are unable to keep an appointment.  What may interact with this medicine?    certain antibiotics given by injection    NSAIDs, medicines for pain and inflammation, like ibuprofen or naproxen    some diuretics like bumetanide, furosemide    teriparatide  This list may not describe all possible interactions. Give your health care provider a list of all the medicines, herbs, non-prescription drugs, or dietary supplements you use. Also tell them if you smoke, drink alcohol, or use illegal drugs. Some items may interact with your medicine.  What should I watch for while using this medicine?  Visit your doctor or health care professional for regular checkups. It may be some time before you see the benefit from this medicine. Do not stop taking your medicine unless your doctor tells you to. Your doctor may order blood tests or other tests to see how you are doing.  Women should inform their doctor if they wish to become pregnant or think they might be pregnant. There is a potential for serious side effects to an unborn child. Talk to your health care professional or pharmacist for more information.  You should make sure that you get enough calcium and vitamin D while you are taking this medicine. Discuss the foods you eat and the vitamins you take with your health care professional.  Some people who take this medicine have severe bone, joint, and/or muscle pain. This medicine may also increase your risk for jaw problems or a broken thigh bone. Tell your doctor right  away if you have severe pain in your jaw, bones, joints, or muscles. Tell your doctor if you have any pain that does not go away or that gets worse.  Tell your dentist and dental surgeon that you are taking this medicine. You should not have major dental surgery while on this medicine. See your dentist to have a dental exam and fix any dental problems before starting this medicine. Take good care of your teeth while on this medicine. Make sure you see your dentist for regular follow-up appointments.  What side effects may I notice from receiving this medicine?  Side effects that you should report to your doctor or health care professional as soon as possible:    allergic reactions like skin rash, itching or hives, swelling of the face, lips, or tongue    anxiety, confusion, or depression    breathing problems    changes in vision    eye pain    feeling faint or lightheaded, falls    jaw pain, especially after dental work    mouth sores    muscle cramps, stiffness, or weakness    redness, blistering, peeling or loosening of the skin, including inside the mouth    trouble passing urine or change in the amount of urine  Side effects that usually do not require medical attention (report to your doctor or health care professional if they continue or are bothersome):    bone, joint, or muscle pain    constipation    diarrhea    fever    hair loss    irritation at site where injected    loss of appetite    nausea, vomiting    stomach upset    trouble sleeping    trouble swallowing    weak or tired  This list may not describe all possible side effects. Call your doctor for medical advice about side effects. You may report side effects to FDA at 0-690-FDA-8641.  Where should I keep my medicine?  This drug is given in a hospital or clinic and will not be stored at home.  NOTE:This sheet is a summary. It may not cover all possible information. If you have questions about this medicine, talk to your doctor, pharmacist, or  health care provider. Copyright  2016 Gold Standard

## 2019-06-11 ENCOUNTER — TELEPHONE (OUTPATIENT)
Dept: FAMILY MEDICINE | Facility: CLINIC | Age: 69
End: 2019-06-11

## 2019-06-11 DIAGNOSIS — Z13.9 SCREENING FOR CONDITION: Primary | ICD-10-CM

## 2019-06-11 NOTE — TELEPHONE ENCOUNTER
Called patient to notify that referral for 3d mammogram was placed as requested, unable to get hold of the patient, left message to call back, when returns phone call, please relay message.    Solitario Joyce RN

## 2019-07-08 ENCOUNTER — ANCILLARY PROCEDURE (OUTPATIENT)
Dept: MAMMOGRAPHY | Facility: CLINIC | Age: 69
End: 2019-07-08
Attending: FAMILY MEDICINE
Payer: COMMERCIAL

## 2019-07-08 DIAGNOSIS — Z12.31 VISIT FOR SCREENING MAMMOGRAM: ICD-10-CM

## 2019-07-08 DIAGNOSIS — Z13.9 SCREENING FOR CONDITION: ICD-10-CM

## 2019-07-16 ENCOUNTER — TELEPHONE (OUTPATIENT)
Dept: AUDIOLOGY | Facility: CLINIC | Age: 69
End: 2019-07-16

## 2019-07-16 NOTE — TELEPHONE ENCOUNTER
[facility] Call Center    Phone Message    May a detailed message be left on voicemail: yes    Reason for Call: Other: Patient has some question for Sammie Kidd regarding her potentially purchasing an hearing aid.  Patient would like a phone call from Sammie Kidd asap to discuss the above. Thanks.     Action Taken: Message routed to:  [facility] ([facility] Audiology

## 2019-07-16 NOTE — TELEPHONE ENCOUNTER
"Spoke with pt over the phone and told her our Audiologist do not do contultations over the phone and that a \"hearing aid consultation will be required to discuss all available options\" per Sammie Kidd. She said she will call the scheduling line if she decides to pursue.   "

## 2019-09-18 DIAGNOSIS — H91.90 HEARING LOSS, UNSPECIFIED HEARING LOSS TYPE, UNSPECIFIED LATERALITY: Primary | ICD-10-CM

## 2019-09-19 ENCOUNTER — OFFICE VISIT (OUTPATIENT)
Dept: AUDIOLOGY | Facility: CLINIC | Age: 69
End: 2019-09-19
Attending: OTOLARYNGOLOGY
Payer: COMMERCIAL

## 2019-09-19 DIAGNOSIS — H91.90 HEARING LOSS, UNSPECIFIED HEARING LOSS TYPE, UNSPECIFIED LATERALITY: ICD-10-CM

## 2019-09-19 DIAGNOSIS — H90.3 SENSORINEURAL HEARING LOSS, BILATERAL: Primary | ICD-10-CM

## 2019-09-19 NOTE — PROGRESS NOTES
AUDIOLOGY REPORT    SUMMARY: Audiology visit completed. See audiogram for results.      RECOMMENDATIONS: Follow-up with Dr. Nissen in ENT as scheduled on 10/15/19.  Today's results will be sent to Dr. Nissen today to review and to determine if testing should be repeated on 10/15/19 due to the improvements noted in right ear today.      Patient was given a copy of today's results to take to hearing aid provider for right hearing aid adjustments based on the changes.  Based on today's results, it is recommended patient continue with 2 hearing aids rather than changing to BICROS.      The patient expressed understanding and agreement with this plan.      Ariadna Randhawa, CCC-A  Licensed Audiologist  [place] #[id number]    Cc Rick Nissen MD

## 2019-09-20 ENCOUNTER — TELEPHONE (OUTPATIENT)
Dept: OTOLARYNGOLOGY | Facility: CLINIC | Age: 69
End: 2019-09-20

## 2019-09-23 NOTE — TELEPHONE ENCOUNTER
Per Dolores Fitch LPN, spoke to patient and informed her that Dr. Patel and our audiologist recommend that she have another audiogram prior to her appointment on 10/15/19. Explained that given the significant change between her audiogram in March to the audiogram from September, it is possible there are other fluctuations and we would like to evaluate that. Patient states that she is not comfortable with the cost of another audiogram, and declined.    Karis Acevedo, EMT

## 2019-10-02 ENCOUNTER — HEALTH MAINTENANCE LETTER (OUTPATIENT)
Age: 69
End: 2019-10-02

## 2019-10-17 ENCOUNTER — ALLIED HEALTH/NURSE VISIT (OUTPATIENT)
Dept: FAMILY MEDICINE | Facility: CLINIC | Age: 69
End: 2019-10-17
Payer: COMMERCIAL

## 2019-10-17 DIAGNOSIS — Z23 NEED FOR PROPHYLACTIC VACCINATION AND INOCULATION AGAINST INFLUENZA: Primary | ICD-10-CM

## 2019-10-18 NOTE — NURSING NOTE
- Patient specifically requested for the normal Fluzone quad, preservefree/prefilled 0.5ml, 6+months instead of Fluzone highdose 65+ years.     Law Na, MA

## 2019-11-06 ENCOUNTER — DOCUMENTATION ONLY (OUTPATIENT)
Dept: OTHER | Facility: CLINIC | Age: 69
End: 2019-11-06

## 2019-11-13 ENCOUNTER — TELEPHONE (OUTPATIENT)
Dept: FAMILY MEDICINE | Facility: CLINIC | Age: 69
End: 2019-11-13

## 2019-11-13 NOTE — TELEPHONE ENCOUNTER
[facility] phone call message - order or referral request from patient:     Order or referral being requested: Referral to Imagining At Location: [facility] and Requested order for a DEXA scan    Additional Details: Need to change diagnosis to osteoperosis so insurance will cover.    Referral only -Specialty  Location     OK to leave a message on voice mail? Yes     Primary language: English      needed? No    Call taken on November 13, 2019 at 8:06 AM by Olivia Kelly    Order request route to [facility] TRIAGE   Referrals Route to [facility] ([facility]/[place]/[facility]) CARE COORDINATOR

## 2019-11-18 DIAGNOSIS — M81.0 AGE-RELATED OSTEOPOROSIS WITHOUT CURRENT PATHOLOGICAL FRACTURE: Primary | ICD-10-CM

## 2019-12-19 ENCOUNTER — OFFICE VISIT (OUTPATIENT)
Dept: FAMILY MEDICINE | Facility: CLINIC | Age: 69
End: 2019-12-19
Payer: COMMERCIAL

## 2019-12-19 VITALS
HEART RATE: 67 BPM | HEIGHT: 66 IN | TEMPERATURE: 97.6 F | SYSTOLIC BLOOD PRESSURE: 132 MMHG | BODY MASS INDEX: 19.99 KG/M2 | WEIGHT: 124.4 LBS | OXYGEN SATURATION: 100 % | RESPIRATION RATE: 16 BRPM | DIASTOLIC BLOOD PRESSURE: 83 MMHG

## 2019-12-19 DIAGNOSIS — J05.0 CROUP: Primary | ICD-10-CM

## 2019-12-19 RX ORDER — PREDNISONE 20 MG/1
20 TABLET ORAL DAILY
Qty: 5 TABLET | Refills: 0 | Status: SHIPPED | OUTPATIENT
Start: 2019-12-19 | End: 2019-12-24

## 2019-12-19 ASSESSMENT — MIFFLIN-ST. JEOR: SCORE: 1111.4

## 2019-12-19 NOTE — PROGRESS NOTES
"       HPI       Sarah العلي is a 69 year old  who presents for   Chief Complaint   Patient presents with     Pharyngitis     x 18days, coughing, mucus is clear to yellow, painful to swallow     Derm Problem     noticed in the morning, not irritating but there is red bumps.      Chief complaint: sore throat  Onset: 12/8/19   Initially started off with URI symptoms however primarily affected the larynx and below for nearly 1 week.  Since then she has had a sore throat as well as more turbulent breathing in the upper airway.  Patient had tonsillectomy in the past  Denies any difficulty swallowing, shortness of breath, chest pain, fever, and chills.  Has had croup like attacks as an adult and feels very similar.   -thought to be croup after confirmed by an x-ray   -2017 seen at [facility] and [facility]'d with croup - given 5 day course of prednisone and improved clinically     +++++++    Problem, Medication and Allergy Lists were reviewed and updated if needed..    Patient is an established patient of this clinic..         Review of Systems:   Review of Systems  Skin: negative except as above  Respiratory: negative except as above  Cardiovascular: negative except as above  Gastrointestinal: negative except as above  Genitourinary: negative except as above  Musculoskeletal: negative except as above  Neurologic: negative except as above  Psychiatric: negative except as above  Hematologic/Lymphatic/Immunologic: negative except as above  Endocrine: negative except as above         Physical Exam:     Vitals:    12/19/19 1410   BP: 132/83   Pulse: 67   Resp: 16   Temp: 97.6  F (36.4  C)   TempSrc: Oral   SpO2: 100%   Weight: 56.4 kg (124 lb 6.4 oz)   Height: 1.685 m (5' 6.34\")     Body mass index is 19.87 kg/m .  Vitals were reviewed and were normal     Physical Exam  Constitutional: Oriented to person, place, and time. Appears well-developed and well-nourished.   HENT:   Head: Normocephalic and atraumatic.   Eyes: Conjunctivae are " normal.   Neck: Normal range of motion. Mild/moderate turbulent tracheal air flow.   Cardiovascular: Normal rate, regular rhythm, normal heart sounds and intact distal pulses.    Pulmonary/Chest: Effort normal and breath sounds normal. No respiratory distress. No wheezes.   Musculoskeletal: Normal range of motion.   Neurological: Alert and oriented to person, place, and time.   Skin: Skin is warm and dry.   Psychiatric: Has a normal mood and affect. Behavior is normal.       Results:   No testing ordered today    Assessment and Plan        1. Croup  Patient has been diagnosed 2 years ago after being seen at [facility] with croup.  These findings were confirmed on an x-ray and she responded well to 5-day course of prednisone.  She is now presenting with similar symptoms that started nearly 2 weeks ago.  We discussed considering a repeat x-ray versus ENT referral and the patient preferred a repeat dose of prednisone and monitor symptoms.  If her symptoms do not improve within the next 5 days would consider ENT referral at that time for potential laryngoscopy.  - predniSONE (DELTASONE) 20 MG tablet; Take 1 tablet (20 mg) by mouth daily for 5 days  Dispense: 5 tablet; Refill: 0       There are no discontinued medications.    Options for treatment and follow-up care were reviewed with the patient. Sarah العلي  engaged in the decision making process and verbalized understanding of the options discussed and agreed with the final plan.    Carolynn Stiles MD

## 2019-12-19 NOTE — PROGRESS NOTES
Preceptor Attestation:   Patient seen and discussed with the resident. Assessment and plan reviewed with resident and agreed upon.   Supervising Physician:  DO [facility]

## 2019-12-27 ENCOUNTER — OFFICE VISIT (OUTPATIENT)
Dept: FAMILY MEDICINE | Facility: CLINIC | Age: 69
End: 2019-12-27
Payer: COMMERCIAL

## 2019-12-27 VITALS
SYSTOLIC BLOOD PRESSURE: 135 MMHG | HEART RATE: 72 BPM | RESPIRATION RATE: 16 BRPM | OXYGEN SATURATION: 100 % | TEMPERATURE: 97.7 F | BODY MASS INDEX: 20.34 KG/M2 | HEIGHT: 66 IN | DIASTOLIC BLOOD PRESSURE: 83 MMHG | WEIGHT: 126.6 LBS

## 2019-12-27 DIAGNOSIS — R05.9 COUGH: Primary | ICD-10-CM

## 2019-12-27 RX ORDER — AZITHROMYCIN 250 MG/1
TABLET, FILM COATED ORAL
Qty: 6 TABLET | Refills: 0 | Status: SHIPPED | OUTPATIENT
Start: 2019-12-27 | End: 2020-01-01

## 2019-12-27 ASSESSMENT — MIFFLIN-ST. JEOR: SCORE: 1121.38

## 2019-12-27 NOTE — PROGRESS NOTES
Preceptor Attestation:   Patient seen, evaluated and discussed with the resident. I have verified the content of the note, which accurately reflects my assessment of the patient and the plan of care.   Supervising Physician:  Matheus Araiza MD

## 2019-12-27 NOTE — PROGRESS NOTES
"       HPI       Sarah العلي is a 69 year old  who presents for   Chief Complaint   Patient presents with     RECHECK     took prednisone for 5 days for her croup with little relief. Occasionally a clear productive cough. Chest congestion still occuring with some SOB, sore throat has improved     Patient here for recheck of respitary concern.  Patient was seen in clinic last week and was diagnosed with croup.  She understands this is an uncommon diagnosis in adults has had it before.  Patient reports she has continued to have coughing although the hoarseness is gone so much better.  Patient has fits of coughing.  Does have a somewhat abnormal tell him to the sound of cough.  Patient has been around her daughter and grandchildren in the last week as she is been visiting them in [place] for the holiday.  Patient denies any fevers.  Does not feel short of breath or have any chest pain today.         +++++++      Problem, Medication and Allergy Lists were reviewed and updated if needed..    Patient is an established patient of this clinic..         Review of Systems:   Review of Systems  ROS: 5 point ROS neg other than the symptoms noted above in the HPI.       Physical Exam:     Vitals:    12/27/19 1616 12/27/19 1619   BP: (!) 143/83 135/83   Pulse: 72    Resp: 16    Temp: 97.7  F (36.5  C)    TempSrc: Oral    SpO2: 100%    Weight: 57.4 kg (126 lb 9.6 oz)    Height: 1.685 m (5' 6.34\")      Body mass index is 20.23 kg/m .  Vitals were reviewed and were normal     Physical Exam  General- No acute distress, well-appearing  Skin- warm, no obvious skin lesions  Neuro- AOX3, CN 2-12 grossly intact  Cardio- RRR, no murmurs  Pulmonary- Coarse breath sounds, mid chest  Psych- appropriate mood and affect    Results:   No testing ordered today    Assessment and Plan        Sarah was seen today for recheck.    Diagnoses and all orders for this visit:    Cough    Differential diagnosis includes walking pneumonia, pertussis " versus persistent viral upper respiratory infection.  Patient is well-appearing and in no acute distress.  I do have some concern about pertussis in particular because she is been around family members who would need prophylaxis I think testing is warranted.  Walking pneumonia is also high on my differential will plan to treat empirically with azithromycin.  Given the duration of symptoms if this is pertussis I would not necessarily treat that because appear to be treating empirically with azithromycin that would cover pertussis as well.  Performed NP swab for Bordetella today and will call patient with results likely early next week.  We will plan on prophylaxing family if test positive for Bordetella.        -     B. pertussis/parapertussis PCR-NP  -     azithromycin (ZITHROMAX) 250 MG tablet; Take 2 tablets (500 mg) by mouth daily for 1 day, THEN 1 tablet (250 mg) daily for 4 days.           There are no discontinued medications.    Options for treatment and follow-up care were reviewed with the patient. Saarh العلي  engaged in the decision making process and verbalized understanding of the options discussed and agreed with the final plan.    Elliott Acevedo, DO

## 2019-12-29 LAB
B PARAPERT DNA SPEC QL NAA+PROBE: NOT DETECTED
B PERT DNA SPEC QL NAA+PROBE: NOT DETECTED
BORDETELLA COMMENT: NORMAL

## 2020-01-01 ENCOUNTER — TELEPHONE (OUTPATIENT)
Dept: FAMILY MEDICINE | Facility: CLINIC | Age: 70
End: 2020-01-01

## 2020-01-01 NOTE — TELEPHONE ENCOUNTER
Message Return  1/1/2020  2:51 PM    I called patient's daughter to attempt to reschedule daughter's appointment on 1/3/20 to 1 PM. Patient also is coming to [facility] on 1/3/20 for continued cold symptoms and was to see Dr. Ramesh at 3:40 PM.     I asked if patient and daughter could come to clinic at 1:20 PM for appointment and she would like to reschedule to see me since it is earlier in the day and so she would not have to wait to see Dr. Ramesh later in the afternoon which is understandable.     I will route this note to  to ensure that patient has appointment with me on 1/1/20 at 1:20 PM and to cancel appointment with Dr. Ramesh 1/3/20.     Patient: Sarah العلي   Phone number-  [phone] (home)       Phone conversation with: Patient

## 2020-01-08 NOTE — PROGRESS NOTES
"HPI:  Patient has a medical history of longstanding ocular migraine (shimmering lights 10-15 minutes). She does not see ocular migraine much anymore. Medical history is significant for a right paraclinoid aneurysm - being followed at [facility]. Occasional floaters. Occasional watering.       Interval Update: [place] to [place].     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 Seen at [facility] and recommends monitoring.    Tinnitis    Sensorineural hearing loss    Migraines    Cerebral aneurysm 12/2017    Vertigo    Erythema multiforme associated with HSV outbreaks    Ocular History:    Ocular Migraine since 2000    Eye Medications:    None    Assessment and Plan:  1.   Presbyopia, both eyes.     NVO. Polycarbonate.     2.   Longstanding Ocular Migraine. Since 2000    Monitor.     3.   Cataract, both eyes.     Not bothersome. Monitor.     4.   Meibomian Gland Dysfunction, both eyes.     Start ocusoft lid scrubs qAM both eyes.     Start warm compress at bedtime both eyes.     Start optional lid massage at bedtime both eyes.     Start fish oil / omega 3 (2000 mg/day)      Medical History:  Past Medical History:   Diagnosis Date     Benign positional vertigo decades     Cerebral aneurysm 12/2017     GERD (gastroesophageal reflux disease)      Hearing problem '04 & \"18    SNHL mild L ear;moderate-severe R ear  >75% loss word compre     Lichen planus      Migraine     with auora     Migraines 2000    Ocular migraine/ Migraine with Aura     Osteopenia 2010     Sensorineural hearing loss 2/04 & 4/18 2/18 worsened     Tinnitus 12/2018    R ear only       Medications:  Current Outpatient Medications   Medication Sig Dispense Refill     albuterol (PROAIR HFA/PROVENTIL HFA/VENTOLIN HFA) 108 (90 Base) MCG/ACT inhaler Inhale 2 puffs into the lungs as needed 8.5 g 3     aspirin 81 MG tablet Take 81 mg by mouth daily       Cholecalciferol (VITAMIN D3) 1000 UNITS CAPS Take 1 capsule by mouth " daily       fluocinonide (LIDEX) 0.05 % gel Apply topically 2 times daily       RaNITidine HCl (ZANTAC PO) Take 150 mg by mouth as needed for heartburn       valACYclovir (VALTREX) 1000 mg tablet Take 2 tablets by mouth 2 times daily as needed  2   Complete documentation of historical and exam elements from today's encounter can be found in the full encounter summary report (not reduplicated in this progress note). I personally obtained the chief complaint(s) and history of present illness.  I confirmed and edited as necessary the review of systems, past medical/surgical history, family history, social history, and examination findings as documented by others; and I examined the patient myself. I personally reviewed the relevant tests, images, and reports as documented above. I formulated and edited as necessary the assessment and plan and discussed the findings and management plan with the patient and family. - Jorden Almaraz OD

## 2020-01-09 ENCOUNTER — OFFICE VISIT (OUTPATIENT)
Dept: DERMATOLOGY | Facility: CLINIC | Age: 70
End: 2020-01-09
Payer: COMMERCIAL

## 2020-01-09 ENCOUNTER — ANCILLARY PROCEDURE (OUTPATIENT)
Dept: BONE DENSITY | Facility: CLINIC | Age: 70
End: 2020-01-09
Attending: FAMILY MEDICINE
Payer: COMMERCIAL

## 2020-01-09 DIAGNOSIS — L82.1 SEBORRHEIC KERATOSIS: Primary | ICD-10-CM

## 2020-01-09 DIAGNOSIS — E85.4 AMYLOIDOSIS OF SKIN (H): ICD-10-CM

## 2020-01-09 DIAGNOSIS — D18.01 CHERRY ANGIOMA: ICD-10-CM

## 2020-01-09 DIAGNOSIS — R20.2 NOTALGIA PARESTHETICA: ICD-10-CM

## 2020-01-09 DIAGNOSIS — M81.0 AGE-RELATED OSTEOPOROSIS WITHOUT CURRENT PATHOLOGICAL FRACTURE: ICD-10-CM

## 2020-01-09 DIAGNOSIS — L99 AMYLOIDOSIS OF SKIN (H): ICD-10-CM

## 2020-01-09 ASSESSMENT — PAIN SCALES - GENERAL: PAINLEVEL: NO PAIN (0)

## 2020-01-09 NOTE — PATIENT INSTRUCTIONS
1. Seborrheic keratosis      2. Notalgia paresthetica with macular amyloid    - camphor-menthol (DERMA SARNA) 0.5-0.5 % external lotion; Apply every 4 hours as needed for itch  Dispense: 222 mL; Refill: 11    3. Cherry angioma
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
## 2020-01-09 NOTE — TELEPHONE ENCOUNTER
FUTURE VISIT INFORMATION      FUTURE VISIT INFORMATION:    Date: 2/10/20    Time: 9:20 AM with SLP    Location: [facility]  REFERRAL INFORMATION:    Referring provider:  Self-Referred    Referring providers clinic:  NA    Reason for visit/diagnosis: Sore Throat, Throat Clearing, Cough, and Horseness    RECORDS REQUESTED FROM:       Clinic name Comments Records Status Imaging Status   [facility] 12/27/19 - OV with Dr. Elliott Acevedo  12/19/19 - OV with Dr. Carolynn Stiles  3/14/19 - OV with Dr. Ute Bell    [facility] 3/26/19 - OV with Dr. Nissen Epic [facility] 4/14/17 - OV with Dr. Ede Jfef Care Everywhere    [facility] 4/14/17 - XR Neck Soft Tissue  Care Everywhere 1/9 Req - In PACs                 * 1/9/20 10:39 AM Faxed req to [facility] for images to be pushed - [facility]  * 1/16/20 12:42 PM Images received and attached in PACs - [facility]

## 2020-01-09 NOTE — NURSING NOTE
Dermatology Rooming Note    Sarah العلي's goals for this visit include:   Chief Complaint   Patient presents with     Derm Problem     Magdiel is here for a skin check, states no concerns.        Mariely Beavers LPN
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
## 2020-01-14 NOTE — PROGRESS NOTES
I talked with and examined Sarah العلي and I agree with the assessment and the plan. HOLLIS Rosa MD.

## 2020-01-16 ENCOUNTER — OFFICE VISIT (OUTPATIENT)
Dept: OPHTHALMOLOGY | Facility: CLINIC | Age: 70
End: 2020-01-16
Attending: OPTOMETRIST
Payer: COMMERCIAL

## 2020-01-16 DIAGNOSIS — H02.883 MEIBOMIAN GLAND DYSFUNCTION (MGD) OF BOTH EYES: ICD-10-CM

## 2020-01-16 DIAGNOSIS — H04.123 DRY EYE SYNDROME OF BOTH EYES: Primary | ICD-10-CM

## 2020-01-16 DIAGNOSIS — H02.886 MEIBOMIAN GLAND DYSFUNCTION (MGD) OF BOTH EYES: ICD-10-CM

## 2020-01-16 DIAGNOSIS — H52.4 PRESBYOPIA OF BOTH EYES: ICD-10-CM

## 2020-01-16 DIAGNOSIS — H25.13 NUCLEAR SENILE CATARACT OF BOTH EYES: ICD-10-CM

## 2020-01-16 PROCEDURE — 92015 DETERMINE REFRACTIVE STATE: CPT | Mod: GY,ZF

## 2020-01-16 PROCEDURE — G0463 HOSPITAL OUTPT CLINIC VISIT: HCPCS | Mod: ZF

## 2020-01-16 RX ORDER — CARBOXYMETHYLCELLULOSE SODIUM 5 MG/ML
1 SOLUTION/ DROPS OPHTHALMIC 4 TIMES DAILY
Qty: 1 BOTTLE | Refills: 11 | Status: SHIPPED | OUTPATIENT
Start: 2020-01-16 | End: 2020-09-03

## 2020-01-16 ASSESSMENT — VISUAL ACUITY
OS_PH_SC+: +3
METHOD: SNELLEN - LINEAR
OD_PH_SC+: -1
OD_PH_SC: 20/20
OD_SC: J16
OS_SC: J16
OD_SC: 20/30
OD_CC: J1
OS_PH_SC: 20/25
OS_SC+: -1
OS_CC: J2
OS_SC: 20/25
OD_SC+: -1

## 2020-01-16 ASSESSMENT — CONF VISUAL FIELD
METHOD: COUNTING FINGERS
OS_NORMAL: 1
OD_NORMAL: 1

## 2020-01-16 ASSESSMENT — TONOMETRY
OS_IOP_MMHG: 15
IOP_METHOD: ICARE
OD_IOP_MMHG: 11

## 2020-01-16 ASSESSMENT — REFRACTION_MANIFEST
OS_SPHERE: +0.75
OS_ADD: +2.50
OS_CYLINDER: SPHERE
OD_AXIS: 019
OD_ADD: +2.50
OD_SPHERE: +1.00
OD_CYLINDER: +0.25

## 2020-01-16 ASSESSMENT — SLIT LAMP EXAM - LIDS
COMMENTS: MEIBOMIAN GLAND DYSFUNCTION
COMMENTS: MEIBOMIAN GLAND DYSFUNCTION

## 2020-01-16 ASSESSMENT — EXTERNAL EXAM - LEFT EYE: OS_EXAM: NORMAL

## 2020-01-16 ASSESSMENT — EXTERNAL EXAM - RIGHT EYE: OD_EXAM: NORMAL

## 2020-01-16 NOTE — PATIENT INSTRUCTIONS
1. Ocusoft lid scrubs, every morning, both eyes.     2. Warm compress with dry eye mask, 10 minutes, at bedtime.    3. Optional lid massage, at bedtime     4. Fish oil / omega 3 (2,000 mg per day).

## 2020-01-16 NOTE — NURSING NOTE
"Chief Complaints and History of Present Illnesses   Patient presents with     Annual Eye Exam     CEE.     Chief Complaint(s) and History of Present Illness(es)     Annual Eye Exam     Associated symptoms: tearing, floaters (No change for about 4-5 years.), photophobia and headache.  Negative for eye pain, dryness, flashes, haloes and glare    Comments: CEE.              Comments     In 2017 Pt had a \"curtain\" move laterally LE temporally to nasally and cut off half of her vision.  This occurrence remained undiagnosed.  Pt reports getting migraines for about 17-18 years and is light sensitive.  Pt has noticed some decrease in VA the past year.  Pt has no pain or other concerns at this time.    CANDIDA Reeves January 16, 2020 10:37 AM                  "

## 2020-01-17 ENCOUNTER — TELEPHONE (OUTPATIENT)
Dept: OTOLARYNGOLOGY | Facility: CLINIC | Age: 70
End: 2020-01-17

## 2020-01-24 ENCOUNTER — TRANSFERRED RECORDS (OUTPATIENT)
Dept: HEALTH INFORMATION MANAGEMENT | Facility: CLINIC | Age: 70
End: 2020-01-24

## 2020-02-05 ENCOUNTER — PRE VISIT (OUTPATIENT)
Dept: OTOLARYNGOLOGY | Facility: CLINIC | Age: 70
End: 2020-02-05

## 2020-02-05 NOTE — TELEPHONE ENCOUNTER
Previsit Call Details  Reason for previsit call: eCheck-in Assistance  Number of days until appointment: 6  Call attempt number: 1  Call outcome: Unsuccessful - Patient Unavailable  Notes: LM for patient requesting she go online to echeck in through [employer], explained where to log in and the check in process so she can complete check in and questioners associate with visit to allow for provider to review responses prior to appointment and allow more time with provider during visit.

## 2020-02-10 ENCOUNTER — OFFICE VISIT (OUTPATIENT)
Dept: OTOLARYNGOLOGY | Facility: CLINIC | Age: 70
End: 2020-02-10
Payer: COMMERCIAL

## 2020-02-10 ENCOUNTER — ANCILLARY PROCEDURE (OUTPATIENT)
Dept: GENERAL RADIOLOGY | Facility: CLINIC | Age: 70
End: 2020-02-10
Attending: OTOLARYNGOLOGY
Payer: COMMERCIAL

## 2020-02-10 ENCOUNTER — PRE VISIT (OUTPATIENT)
Dept: OTOLARYNGOLOGY | Facility: CLINIC | Age: 70
End: 2020-02-10

## 2020-02-10 VITALS — BODY MASS INDEX: 20.41 KG/M2 | WEIGHT: 127 LBS | HEIGHT: 66 IN

## 2020-02-10 DIAGNOSIS — R05.9 COUGH: ICD-10-CM

## 2020-02-10 DIAGNOSIS — R09.89 THROAT CLEARING: Primary | ICD-10-CM

## 2020-02-10 DIAGNOSIS — J38.7 IRRITABLE LARYNX SYNDROME: ICD-10-CM

## 2020-02-10 DIAGNOSIS — R49.0 DYSPHONIA: ICD-10-CM

## 2020-02-10 ASSESSMENT — MIFFLIN-ST. JEOR: SCORE: 1117.82

## 2020-02-10 ASSESSMENT — PAIN SCALES - GENERAL: PAINLEVEL: MILD PAIN (2)

## 2020-02-10 NOTE — NURSING NOTE
"Chief Complaint   Patient presents with     Consult     Sore throat, cough, throat clearing, dysphonia     Height 1.676 m (5' 6\"), weight 57.6 kg (127 lb), not currently breastfeeding.    Pam Galvez, EMT  "

## 2020-02-10 NOTE — LETTER
"2/10/2020      RE: Sarah العلي  [address]  [place] [address]       Dear Colleague:    I had the pleasure of meeting Sarah العلي in consultation at the [facility] of the [facility] at your request, for evaluation of multiple throat concerns. The note from our visit follows. Speech recognition software may have been used in the documentation below; input is reviewed before signature to the best of my ability. I appreciate the opportunity to participate in the care of this pleasant patient.    Please feel free to contact me with any questions.    Sincerely yours,    Desi Calix M.D., M.P.H.  , Laryngology  Director, [facility]  Otolaryngology- Head & Neck Surgery  [phone]          =====    History of Present Illness:   Sarah العلي is a pleasant 69-year-old female with a history of hearing loss and vertigo, prior history of smoking, who presents with a history of throat concerns. Symptoms include throat irritation, pain/ache in throat, mucus in throat, frequent throat-clearing and poor voice quality.      Voice  She had an upper respiratory infection (\"adult croup\") in December of 2019, and was diagnosed with mycoplasma per the patient.  She was treated with azithromycin and prednisone.  The voice problem has persisted since that time.   Her typical vocal demand is intermittent.  She is not very bothered by her voice.  She also notes that her voice cuts off, and that she has a gravelly and scratchy voice that is worse with use.       Swallowing  She also notes a feeling of swallow problems.  She does experience increased swallowing effort with all textures.  The problem has been stable over time.  She takes a regular diet with thin liquids.      Cough/Throat-clearing  She constantly has a sense of tickle in her throat, which provokes near constant throat-clearing.  This is worsening over " time.  Triggers including eating, heartburn, mucus, and lying down.  She can go hours without coughing.      Breathing  No concerns.       Throat discomfort  As above.  She also notes a feeling of burning and feels like something feels stuck.      Reflux-type symptoms  She experiences heartburn/indigestion daily. She is taking reflux medications. She was seen recently at [facility] and had a prior EGD in 2015 that showed reflux esophagitis, and no evidence of Benítez's. The plan was to manage nasal symptoms, potentially also work up with a chest x-ray.      Prior Epic and outside records were reviewed for this visit. She has previously seen Dr. Nissen for hearing loss.      MEDICATIONS:     Current Outpatient Medications   Medication Sig Dispense Refill     albuterol (PROAIR HFA/PROVENTIL HFA/VENTOLIN HFA) 108 (90 Base) MCG/ACT inhaler Inhale 2 puffs into the lungs as needed 8.5 g 3     camphor-menthol (DERMASARRA) 0.5-0.5 % external lotion Apply every 4 hours as needed for itch 222 mL 11     carboxymethylcellulose PF (CARBOXYMETHYLCELLULOSE SODIUM) 0.5 % ophthalmic solution Place 1 drop into both eyes 4 times daily 1 Bottle 11     Cholecalciferol (VITAMIN D3) 1000 UNITS CAPS Take 1 capsule by mouth daily       fluocinonide (LIDEX) 0.05 % gel Apply topically 2 times daily       methylcellulose (CITRUCEL) 500 MG TABS tablet        valACYclovir (VALTREX) 1000 mg tablet Take 2 tablets by mouth 2 times daily as needed  2     aspirin 81 MG tablet Take 81 mg by mouth daily       Eyelid Cleansers (OCUSOFT LID SCRUB) PADS Externally apply 1 Application topically 2 times daily 60 each 11     RaNITidine HCl (ZANTAC PO) Take 150 mg by mouth as needed for heartburn         ALLERGIES:    Allergies   Allergen Reactions     Atorvastatin Rash     Nickel Rash     Other reaction(s): *Unknown  Other reaction(s): *Unknown       PAST MEDICAL HISTORY:   Past Medical History:   Diagnosis Date     Benign positional vertigo  "decades     Cerebral aneurysm 2017     GERD (gastroesophageal reflux disease)      Hearing problem '04 & \"18    SNHL mild L ear;moderate-severe R ear  >75% loss word compre     Lichen planus      Migraine     with auora     Migraines     Ocular migraine/ Migraine with Aura     Osteopenia 2010     Sensorineural hearing loss  &  worsened     Tinnitus 2018    R ear only        PAST SURGICAL HISTORY:   Past Surgical History:   Procedure Laterality Date     DILATION AND CURETTAGE       LAPAROSCOPY       TONSILLECTOMY       TONSILLECTOMY & ADENOIDECTOMY         HABITS/SOCIAL HISTORY:    Social History     Tobacco Use     Smoking status: Former Smoker     Packs/day: 0.50     Years: 12.00     Pack years: 6.00     Types: Cigarettes     Start date: 1968     Last attempt to quit: 1980     Years since quittin.1     Smokeless tobacco: Never Used   Substance Use Topics     Alcohol use: Yes     Alcohol/week: 1.0 - 2.0 standard drinks     Comment: Minimal 1 glass wine approx. 2x/month         FAMILY HISTORY:    Family History   Problem Relation Age of Onset     Cerebrovascular Disease Father          CVA      Other - See Comments Mother          58 scleroderma     Heart Failure Maternal Grandfather      Heart Disease Maternal Grandfather         CHF     Diabetes Maternal Grandmother         Type 1     Cancer Paternal Grandmother         Endometrial CA of uterus     Breast Cancer Paternal Aunt      Diabetes Cousin         Type 1     Glaucoma No family hx of      Macular Degeneration No family hx of     .    REVIEW OF SYSTEMS:  The patient completed a comprehensive 11 point review of systems (below), which was reviewed. Positives are as noted below; pertinent findings are as noted in the HPI.     Patient Supplied Answers to Review of Systems  [facility] ROS 2020   Neurology -   Ears, Nose, Throat Nasal congestion or drainage, Sore throat, Trouble swallowing, Hoarseness "   Cardiopulmonary Cough   Gastrointestinal/Genitourinary Heartburn/indigestion   Allergy/Immunology Allergies or hay fever   Endocrine -            PHYSICAL EXAMINATION:  General: The patient was alert and conversant, and in no acute distress.    Eyes: PERRL, conjunctiva and lids normal, sclera nonicteric.  Nose: Anterior rhinoscopy: no gross abnormalities. no  bleeding; no  mucopurulence; septum grossly normal, mild mucoid drainage and/or crusting.  Oral cavity/oropharynx: No masses or lesions. Dentition in good condition. Floor of mouth and oral tongue soft to palpation. Tongue mobility and palate elevation intact and symmetric.  Ears: Normal auricles, external auditory canals bilaterally. Visualized portions of tympanic membranes normal bilaterally.   Neck: No palpable cervical lymphadenopathy. There was no significant tenderness to palpation of the thyrohyoid space, which was not narrow. No obvious thyroid abnormality. Landmarks palpable.  Resp: Breathing comfortably, no stridor or stertor.  Neuro: Symmetric facial function. Other cranial nerves as documented above.  Psych: Normal affect, pleasant and cooperative.  Voice/speech: No significant dysphonia.  Extremities: No cyanosis, clubbing, or edema of the upper extremities.      Intake scores  Total Score for Last Patient-Answered VHI Questionnaire  No flowsheet data found.  Total Score for Last Patient-Answered EAT Questionnaire  No flowsheet data found.  Total Score for Last Patient-Answered CSI Questionnaire  No flowsheet data found.    PROCEDURE:   Flexible fiberoptic laryngoscopy and laryngovideostroboscopy  Indications: This procedure was warranted to evaluate the patient's laryngeal anatomy and function. Risks, benefits, and alternatives were discussed.  Description: After written informed consent was obtained, a time-out was performed to confirm patient identity, procedure, and procedure site. Topical 3% lidocaine with 0.25% phenylephrine was applied to  the nasal cavities. I performed the endoscopy and no complications were apparent. Continuous and stroboscopic light were utilized to assess routine phonation and variable frequency phonation.  Performed by: Desi Calix MD MPH  SLP: NA  Findings: Normal nasopharynx. Normal base of tongue, valleculae, and epiglottis. Vocal fold mobility: right: normal; left: normal. Medial edges of the vocal folds: smooth and straight; mildly bowed at times. No focal mucosal lesions were observed on the true vocal folds. Glissade produced appropriate elongation. There was mild supraglottic recruitment with connected speech. Mucosa of false vocal folds, aryepiglottic folds, piriform sinuses, and posterior glottis unremarkable. Airway was patent.   No focal lesions on NBI.    The addition of stroboscopy allowed evaluation of the mucosal wave.   Amplitude: right: normal; left: normal. Symmetry: intermittent symmetry. Closure pattern: complete. Closure plane: at glottic level. Phase distribution: open-phase predominant habitually; normal with coaching.               IMPRESSION AND PLAN:   Sarah العلي presents with irritable larynx symptoms and residual thickened secretions in the context of an upper respiratory infection a few months ago. She is also working with [facility] given a history of reflux esophagitis.    Recommendations:  1) Laryngeal hygiene, saline rinses/gargles as she does still have some thickened secretions today  2) I recommended speech therapy as initial primary management, with goals including reducing laryngeal irritability, improving laryngeal efficiency and decreasing vocal fold trauma.  She understands that she will need a new patient evaluation when starting speech therapy as she had declined to be seen by them today. If her symptoms improve prior to the date of her evaluation with them, she understands to call ahead to cancel.  3) She reports concern for possible cancer and we did discuss  consideration of a CT scan of the neck with contrast to assess for any occult findings. She would like to hold off for now because of concerns about contrast/radiation but will call if she would like to proceed. Certainly if her symptoms worsen we will want to pursue this.  4) Patient requests a chest x-ray as it was suggested by her Gastroenterology provider for cough. I will order this. She understands if any unexpected findings I may need her to work with her primary care provider on this.  5) I deferred to [facility] on reflux-related issues.     She will return to see me if her symptoms persist despite speech therapy. I appreciate the opportunity to participate in the care of this patient.       Desi Calix MD

## 2020-02-10 NOTE — PROGRESS NOTES
"Dear Colleague:    I had the pleasure of meeting Sarah العلي in consultation at the [facility] of the [facility] at your request, for evaluation of multiple throat concerns. The note from our visit follows. Speech recognition software may have been used in the documentation below; input is reviewed before signature to the best of my ability. I appreciate the opportunity to participate in the care of this pleasant patient.    Please feel free to contact me with any questions.    Sincerely yours,    Desi Calix M.D., M.P.H.  , Laryngology  Director, [facility]  Otolaryngology- Head & Neck Surgery  [phone]          =====    History of Present Illness:   Sarah العلي is a pleasant 69-year-old female with a history of hearing loss and vertigo, prior history of smoking, who presents with a history of throat concerns. Symptoms include throat irritation, pain/ache in throat, mucus in throat, frequent throat-clearing and poor voice quality.      Voice  She had an upper respiratory infection (\"adult croup\") in December of 2019, and was diagnosed with mycoplasma per the patient.  She was treated with azithromycin and prednisone.  The voice problem has persisted since that time.   Her typical vocal demand is intermittent.  She is not very bothered by her voice.  She also notes that her voice cuts off, and that she has a gravelly and scratchy voice that is worse with use.       Swallowing  She also notes a feeling of swallow problems.  She does experience increased swallowing effort with all textures.  The problem has been stable over time.  She takes a regular diet with thin liquids.      Cough/Throat-clearing  She constantly has a sense of tickle in her throat, which provokes near constant throat-clearing.  This is worsening over time.  Triggers including eating, heartburn, mucus, and lying down.  She can go hours " without coughing.      Breathing  No concerns.       Throat discomfort  As above.  She also notes a feeling of burning and feels like something feels stuck.      Reflux-type symptoms  She experiences heartburn/indigestion daily. She is taking reflux medications. She was seen recently at [facility] and had a prior EGD in 2015 that showed reflux esophagitis, and no evidence of Benítez's. The plan was to manage nasal symptoms, potentially also work up with a chest x-ray.      Prior Epic and outside records were reviewed for this visit. She has previously seen Dr. Nissen for hearing loss.      MEDICATIONS:     Current Outpatient Medications   Medication Sig Dispense Refill     albuterol (PROAIR HFA/PROVENTIL HFA/VENTOLIN HFA) 108 (90 Base) MCG/ACT inhaler Inhale 2 puffs into the lungs as needed 8.5 g 3     camphor-menthol (DERMASARRA) 0.5-0.5 % external lotion Apply every 4 hours as needed for itch 222 mL 11     carboxymethylcellulose PF (CARBOXYMETHYLCELLULOSE SODIUM) 0.5 % ophthalmic solution Place 1 drop into both eyes 4 times daily 1 Bottle 11     Cholecalciferol (VITAMIN D3) 1000 UNITS CAPS Take 1 capsule by mouth daily       fluocinonide (LIDEX) 0.05 % gel Apply topically 2 times daily       methylcellulose (CITRUCEL) 500 MG TABS tablet        valACYclovir (VALTREX) 1000 mg tablet Take 2 tablets by mouth 2 times daily as needed  2     aspirin 81 MG tablet Take 81 mg by mouth daily       Eyelid Cleansers (OCUSOFT LID SCRUB) PADS Externally apply 1 Application topically 2 times daily 60 each 11     RaNITidine HCl (ZANTAC PO) Take 150 mg by mouth as needed for heartburn         ALLERGIES:    Allergies   Allergen Reactions     Atorvastatin Rash     Nickel Rash     Other reaction(s): *Unknown  Other reaction(s): *Unknown       PAST MEDICAL HISTORY:   Past Medical History:   Diagnosis Date     Benign positional vertigo decades     Cerebral aneurysm 12/2017     GERD (gastroesophageal reflux disease)       "Hearing problem  & \"    SNHL mild L ear;moderate-severe R ear  >75% loss word compre     Lichen planus      Migraine     with auora     Migraines     Ocular migraine/ Migraine with Aura     Osteopenia 2010     Sensorineural hearing loss  &  worsened     Tinnitus 2018    R ear only        PAST SURGICAL HISTORY:   Past Surgical History:   Procedure Laterality Date     DILATION AND CURETTAGE       LAPAROSCOPY       TONSILLECTOMY       TONSILLECTOMY & ADENOIDECTOMY         HABITS/SOCIAL HISTORY:    Social History     Tobacco Use     Smoking status: Former Smoker     Packs/day: 0.50     Years: 12.00     Pack years: 6.00     Types: Cigarettes     Start date: 1968     Last attempt to quit: 1980     Years since quittin.1     Smokeless tobacco: Never Used   Substance Use Topics     Alcohol use: Yes     Alcohol/week: 1.0 - 2.0 standard drinks     Comment: Minimal 1 glass wine approx. 2x/month         FAMILY HISTORY:    Family History   Problem Relation Age of Onset     Cerebrovascular Disease Father          CVA 2006     Other - See Comments Mother          58 scleroderma     Heart Failure Maternal Grandfather      Heart Disease Maternal Grandfather         CHF     Diabetes Maternal Grandmother         Type 1     Cancer Paternal Grandmother         Endometrial CA of uterus     Breast Cancer Paternal Aunt      Diabetes Cousin         Type 1     Glaucoma No family hx of      Macular Degeneration No family hx of     .    REVIEW OF SYSTEMS:  The patient completed a comprehensive 11 point review of systems (below), which was reviewed. Positives are as noted below; pertinent findings are as noted in the HPI.     Patient Supplied Answers to Review of Systems  [facility] ROS 2020   Neurology -   Ears, Nose, Throat Nasal congestion or drainage, Sore throat, Trouble swallowing, Hoarseness   Cardiopulmonary Cough   Gastrointestinal/Genitourinary Heartburn/indigestion "   Allergy/Immunology Allergies or hay fever   Endocrine -            PHYSICAL EXAMINATION:  General: The patient was alert and conversant, and in no acute distress.    Eyes: PERRL, conjunctiva and lids normal, sclera nonicteric.  Nose: Anterior rhinoscopy: no gross abnormalities. no  bleeding; no  mucopurulence; septum grossly normal, mild mucoid drainage and/or crusting.  Oral cavity/oropharynx: No masses or lesions. Dentition in good condition. Floor of mouth and oral tongue soft to palpation. Tongue mobility and palate elevation intact and symmetric.  Ears: Normal auricles, external auditory canals bilaterally. Visualized portions of tympanic membranes normal bilaterally.   Neck: No palpable cervical lymphadenopathy. There was no significant tenderness to palpation of the thyrohyoid space, which was not narrow. No obvious thyroid abnormality. Landmarks palpable.  Resp: Breathing comfortably, no stridor or stertor.  Neuro: Symmetric facial function. Other cranial nerves as documented above.  Psych: Normal affect, pleasant and cooperative.  Voice/speech: No significant dysphonia.  Extremities: No cyanosis, clubbing, or edema of the upper extremities.      Intake scores  Total Score for Last Patient-Answered VHI Questionnaire  No flowsheet data found.  Total Score for Last Patient-Answered EAT Questionnaire  No flowsheet data found.  Total Score for Last Patient-Answered CSI Questionnaire  No flowsheet data found.    PROCEDURE:   Flexible fiberoptic laryngoscopy and laryngovideostroboscopy  Indications: This procedure was warranted to evaluate the patient's laryngeal anatomy and function. Risks, benefits, and alternatives were discussed.  Description: After written informed consent was obtained, a time-out was performed to confirm patient identity, procedure, and procedure site. Topical 3% lidocaine with 0.25% phenylephrine was applied to the nasal cavities. I performed the endoscopy and no complications were  apparent. Continuous and stroboscopic light were utilized to assess routine phonation and variable frequency phonation.  Performed by: Desi Calix MD MPH  SLP: NA  Findings: Normal nasopharynx. Normal base of tongue, valleculae, and epiglottis. Vocal fold mobility: right: normal; left: normal. Medial edges of the vocal folds: smooth and straight; mildly bowed at times. No focal mucosal lesions were observed on the true vocal folds. Glissade produced appropriate elongation. There was mild supraglottic recruitment with connected speech. Mucosa of false vocal folds, aryepiglottic folds, piriform sinuses, and posterior glottis unremarkable. Airway was patent.   No focal lesions on NBI.    The addition of stroboscopy allowed evaluation of the mucosal wave.   Amplitude: right: normal; left: normal. Symmetry: intermittent symmetry. Closure pattern: complete. Closure plane: at glottic level. Phase distribution: open-phase predominant habitually; normal with coaching.               IMPRESSION AND PLAN:   Sarah العلي presents with irritable larynx symptoms and residual thickened secretions in the context of an upper respiratory infection a few months ago. She is also working with [facility] given a history of reflux esophagitis.    Recommendations:  1) Laryngeal hygiene, saline rinses/gargles as she does still have some thickened secretions today  2) I recommended speech therapy as initial primary management, with goals including reducing laryngeal irritability, improving laryngeal efficiency and decreasing vocal fold trauma.  She understands that she will need a new patient evaluation when starting speech therapy as she had declined to be seen by them today. If her symptoms improve prior to the date of her evaluation with them, she understands to call ahead to cancel.  3) She reports concern for possible cancer and we did discuss consideration of a CT scan of the neck with contrast to assess for any  occult findings. She would like to hold off for now because of concerns about contrast/radiation but will call if she would like to proceed. Certainly if her symptoms worsen we will want to pursue this.  4) Patient requests a chest x-ray as it was suggested by her Gastroenterology provider for cough. I will order this. She understands if any unexpected findings I may need her to work with her primary care provider on this.  5) I deferred to [facility] on reflux-related issues.     She will return to see me if her symptoms persist despite speech therapy. I appreciate the opportunity to participate in the care of this patient.

## 2020-02-10 NOTE — PROGRESS NOTES
Critical access hospital  Pedro Velazquez Jr., M.D., F.A.C.S.  Desi Calix M.D., M.P.H.  Cailin Torres, Ph.D., CCC/SLP  Perlita Alcantar M.M. (voice), M.A., CCC/SLP  Fabian Lugo M.M. (voice), M.A., [facility]/SLP    Critical access hospital    Clinician: Perlita Alcantar M.M. (voice), M.A., CCC/SLP  Seen in conjunction with: Dr. Calix  Patient: Sarah العلي  Date of Visit: 2/10/2020    HISTORY  PATIENT INFORMATION  Sarah العلي was seen in clinic by Dr. Calix today.  She did not wish to have speech services involved in her care, although it was recommended by Dr. Calix.  Please see Dr. Calix's report for additional details.    DIAGNOSIS/REASON FOR REFERRAL  Dysphonia/Throat issues/ Evaluate, perform laryngeal exam, treat as appropriate    No charge for today s session; charges will be billed at the completion of the evaluation  NO CHARGE FACILITY FEE (58783)    ICD-10 code (s): R49.0 (Dysphonia) / Irritable larynx    Perlita Alcantar M.M. (voice), MSADIA., CCC/SLP  Speech-Language Pathologist  [facility]  [phone]

## 2020-02-10 NOTE — LETTER
2/10/2020       RE: Sarah العلي  [address]  [place] [address]     Dear Colleague,    Thank you for referring your patient, Sarah العلي, to the [facility]. Please see a copy of my visit note below.    [facility]  Pedro Velazquez Jr., M.D., F.A.C.S.  Desi Calix M.D., M.P.H.  Cailin Torres, Ph.D., CCC/SLP  Perlita Alcantar M.M. (voice), M.A., CCC/SLP  Fabian Lugo M.M. (voice), M.A., CCC/SLP    [facility]    Clinician: Perlita Alcantar M.M. (voice), M.A., CCC/SLP  Seen in conjunction with: Dr. Calix  Patient: Sarah العلي  Date of Visit: 2/10/2020    HISTORY  PATIENT INFORMATION  Sarah العلي was seen in clinic by Dr. Calix today.  She did not wish to have speech services involved in her care, although it was recommended by Dr. Calix.  Please see Dr. Calix's report for additional details.    DIAGNOSIS/REASON FOR REFERRAL  Dysphonia/Throat issues/ Evaluate, perform laryngeal exam, treat as appropriate    No charge for today s session; charges will be billed at the completion of the evaluation  NO CHARGE FACILITY FEE (97921)    ICD-10 code (s): R49.0 (Dysphonia) / Irritable larynx    Perlita Alcantar M.M. (voice), M.A., CCC/SLP  Speech-Language Pathologist  [facility]  [phone]              Again, thank you for allowing me to participate in the care of your patient.      Sincerely,    HANSEL Cruz

## 2020-02-10 NOTE — PATIENT INSTRUCTIONS
Thank you for choosing  [facility]  Please follow up with Dr. Calix as needed.    Dr. Calix recommends the followin) Saline rinses/gargles  2) Speech therapy  3) Please follow up about Chest xray with your primary care provider  4) Please follow up with [facility] about any reflux related issues.  [phone] appointment scheduling option 1 and nurse advice option 3.

## 2020-05-26 ENCOUNTER — TELEPHONE (OUTPATIENT)
Dept: FAMILY MEDICINE | Facility: CLINIC | Age: 70
End: 2020-05-26

## 2020-05-26 NOTE — TELEPHONE ENCOUNTER
[facility] phone call message-patient reporting a symptom:     Symptom: Patient states she has not had a bowel movement in 7 days, feels her bowel is obstructed and does not know what to do next.     When did symptoms begin? 7 days ago    Characteristics: (location on body, intensity, what makes it better or worse, associated symptoms):      Additional Details:         Same Day Visit Offered: Not available    Additional comments:     OK to leave message on voice mail? Yes    Advised patient that RN would call back within 3 hours, unless emergent   Primary language: English      needed? No    Call taken on May 26, 2020 at 1:35 PM by April Bivens

## 2020-05-26 NOTE — TELEPHONE ENCOUNTER
"Received transfer call and spoke with patient- in regards to gastrointestinal complaints Pt reports constipation, bloating and cramping. Pt denies abdominal swelling, nausea or vomiting. Pt reported to have had recent GI consult and had taken \"Miralax and getorade\", which resulted -\"small scant of loose stools\". Pt reported having concerns- for GI obstruction. Unable to offer same day telephone consult,  [facility] in [address] given for evaluation and treatment , author notified patient that high priority message will be routed to PCP/triage assisting provider for additional advisement, patient verbalized understanding and agreed.      Will continue to follow up.    Solitario Joyce RN    "

## 2020-05-26 NOTE — TELEPHONE ENCOUNTER
"Called patient to inform provider' message/recopmmendation- unable to get hold, left message to call back the clinic-if patient returns phone call, please relay message, per triaging assisting provider-\"If larry is not having any nausea/vomiting it's probably unlikely that she is obstructed and she should be fine to continue using miralax (as much as she needs to have a bowel movement), fiber (which it looks like GI had recommended), drinking water, and staying active. It looks like this is a chronic problem for her, so if she has more concerns about it then I would recommend a virtual visit to talk more about her options for treatment. But obviously if patient develops acutely worsening symptoms I would recommend ED/urgent care\", MD Nesha.    Solitario Joyce RN    "

## 2020-05-26 NOTE — TELEPHONE ENCOUNTER
"Received transfer call and spoke with patient -Pt denies-any vomiting and nausea- All pertinent information/recomedation given-per elda assisting provider- \"If larry is not having any nausea/vomiting it's probably unlikely that she is obstructed and she should be fine to continue using miralax (as much as she needs to have a bowel movement), fiber (which it looks like GI had recommended), drinking water, and staying active. It looks like this is a chronic problem for her, so if she has more concerns about it then I would recommend a virtual visit to talk more about her options for treatment. But obviously if patient develops acutely worsening symptoms I would recommend ED/urgent care\", MD Nesha.      Patient verbalized understanding and agreed.    Solitario Joyce RN    "

## 2020-08-28 ENCOUNTER — APPOINTMENT (OUTPATIENT)
Dept: GENERAL RADIOLOGY | Facility: CLINIC | Age: 70
End: 2020-08-28
Attending: EMERGENCY MEDICINE
Payer: COMMERCIAL

## 2020-08-28 ENCOUNTER — HOSPITAL ENCOUNTER (EMERGENCY)
Facility: CLINIC | Age: 70
Discharge: HOME | End: 2020-08-28
Attending: EMERGENCY MEDICINE | Admitting: EMERGENCY MEDICINE
Payer: COMMERCIAL

## 2020-08-28 VITALS
WEIGHT: 127 LBS | RESPIRATION RATE: 16 BRPM | HEIGHT: 67 IN | TEMPERATURE: 97.7 F | DIASTOLIC BLOOD PRESSURE: 71 MMHG | BODY MASS INDEX: 19.93 KG/M2 | SYSTOLIC BLOOD PRESSURE: 131 MMHG | OXYGEN SATURATION: 100 % | HEART RATE: 67 BPM

## 2020-08-28 DIAGNOSIS — R07.9 CHEST PAIN, UNSPECIFIED TYPE: ICD-10-CM

## 2020-08-28 LAB
ANION GAP SERPL CALCULATED.3IONS-SCNC: 3 MMOL/L (ref 3–14)
BUN SERPL-MCNC: 20 MG/DL (ref 7–30)
CALCIUM SERPL-MCNC: 9.2 MG/DL (ref 8.5–10.1)
CHLORIDE SERPL-SCNC: 103 MMOL/L (ref 94–109)
CO2 SERPL-SCNC: 27 MMOL/L (ref 20–32)
CREAT SERPL-MCNC: 0.72 MG/DL (ref 0.52–1.04)
D DIMER PPP FEU-MCNC: <0.3 UG/ML FEU (ref 0–0.5)
DIFFERENTIAL METHOD BLD: NORMAL
EOSINOPHIL # BLD AUTO: 0.1 10E9/L (ref 0–0.7)
EOSINOPHIL NFR BLD AUTO: 1 %
ERYTHROCYTE [DISTWIDTH] IN BLOOD BY AUTOMATED COUNT: 12.5 % (ref 10–15)
GFR SERPL CREATININE-BSD FRML MDRD: 85 ML/MIN/{1.73_M2}
GLUCOSE SERPL-MCNC: 104 MG/DL (ref 70–99)
HCT VFR BLD AUTO: 41.3 % (ref 35–47)
HGB BLD-MCNC: 13.7 G/DL (ref 11.7–15.7)
INTERPRETATION ECG - MUSE: NORMAL
LYMPHOCYTES # BLD AUTO: 1.4 10E9/L (ref 0.8–5.3)
LYMPHOCYTES NFR BLD AUTO: 26 %
MCH RBC QN AUTO: 29.6 PG (ref 26.5–33)
MCHC RBC AUTO-ENTMCNC: 33.2 G/DL (ref 31.5–36.5)
MCV RBC AUTO: 89 FL (ref 78–100)
MONOCYTES # BLD AUTO: 0.4 10E9/L (ref 0–1.3)
MONOCYTES NFR BLD AUTO: 8 %
NEUTROPHILS # BLD AUTO: 3.5 10E9/L (ref 1.6–8.3)
NEUTROPHILS NFR BLD AUTO: 65 %
PLATELET # BLD AUTO: 295 10E9/L (ref 150–450)
POTASSIUM SERPL-SCNC: 4 MMOL/L (ref 3.4–5.3)
RBC # BLD AUTO: 4.63 10E12/L (ref 3.8–5.2)
SODIUM SERPL-SCNC: 133 MMOL/L (ref 133–144)
TROPONIN I SERPL-MCNC: <0.015 UG/L (ref 0–0.04)
WBC # BLD AUTO: 5.4 10E9/L (ref 4–11)

## 2020-08-28 PROCEDURE — 80048 BASIC METABOLIC PNL TOTAL CA: CPT | Performed by: EMERGENCY MEDICINE

## 2020-08-28 PROCEDURE — 85025 COMPLETE CBC W/AUTO DIFF WBC: CPT | Performed by: EMERGENCY MEDICINE

## 2020-08-28 PROCEDURE — 99285 EMERGENCY DEPT VISIT HI MDM: CPT | Mod: 25

## 2020-08-28 PROCEDURE — 85379 FIBRIN DEGRADATION QUANT: CPT | Performed by: EMERGENCY MEDICINE

## 2020-08-28 PROCEDURE — 71046 X-RAY EXAM CHEST 2 VIEWS: CPT

## 2020-08-28 PROCEDURE — 84484 ASSAY OF TROPONIN QUANT: CPT | Performed by: EMERGENCY MEDICINE

## 2020-08-28 PROCEDURE — 93005 ELECTROCARDIOGRAM TRACING: CPT

## 2020-08-28 ASSESSMENT — ENCOUNTER SYMPTOMS
DIZZINESS: 1
NAUSEA: 1
NERVOUS/ANXIOUS: 1
DIARRHEA: 0
DIAPHORESIS: 1
WEAKNESS: 1
SHORTNESS OF BREATH: 1
BLOOD IN STOOL: 0

## 2020-08-28 ASSESSMENT — MIFFLIN-ST. JEOR: SCORE: 1128.7

## 2020-08-28 NOTE — ED PROVIDER NOTES
History     Chief Complaint:  Chest pain    HPI   Sarah العلي is a 70 year old female with a history of GERD who presents with chest pain. The patient reports she was woken up this morning around 0500 with midchest pain that radiated across her chest, lasting about 5-10 minutes. She states the pain is similar to GERD is higher in her chest. She endorses and onset of diaphoresis, weakness, dizziness, anxiety, nausea, and shortness of breath which has resolved now. She denies diarrhea, bloody/black stools, and loss of sense of taste and smell. Of note, she was recently tested for COVID secondary to onset of headache and sore throat which have since resolved.     CARDIAC RISK FACTORS:  Sex:    female  Tobacco:   yes  Hypertension:   no  Hyperlipidemia:  no  Diabetes:   no  Family History:  no    PE/DVT RISK FACTORS:  Sex:    female  Hormones:   no  Tobacco:   yes  Cancer:   no  Travel:   no  Surgery:   no  Other immobilization: no  Personal history:  no  Family history:  no    Allergies:  Atorvastatin   Nickel     Medications:    Albuterol  Aspirin 81  Cholecalciferol   Citrucel   Zantac  Valtrex    Past Medical History:    Age-related osteoporosis  Osteopenia of left hip  Bilateral hearing loss  Right internal carotid artery aneurysm  Erythema multiforme  Laryngospasm  Oral lichen planus  GERD  Lichen planus     Past Surgical History:    Dilation and curettage  Tonsillectomy and Adenoidectomy   Mora teeth extraction    Family History:    Cerebrovascular disease   Scleroderma    Social History:  Smoking status- former smoker  Alcohol use- yes  Drug use- no   Marital Status:   [2]     Review of Systems   Constitutional: Positive for diaphoresis.   Respiratory: Positive for shortness of breath.    Cardiovascular: Positive for chest pain.   Gastrointestinal: Positive for nausea. Negative for blood in stool and diarrhea.   Neurological: Positive for dizziness and weakness.   Psychiatric/Behavioral: The  "patient is nervous/anxious.    All other systems reviewed and are negative.    Physical Exam     Patient Vitals for the past 24 hrs:   BP Temp Temp src Pulse Resp SpO2 Height Weight   08/28/20 0910 (!) 161/68 97.7  F (36.5  C) Oral 78 16 100 % 1.702 m (5' 7\") 57.6 kg (127 lb)     Physical Exam  SKIN:  Warm, dry.  HEMATOLOGIC/IMMUNOLOGIC/LYMPHATIC:  No pallor. No lower extremity edema.  HENT:  No JVD.  CARDIOVASCULAR:  Regular rate and rhythm, no murmur.  Radial pulses equal and normal.  RESPIRATORY:  No respiratory distress, breath sounds equal and normal.  GASTROINTESTINAL:  Soft, nontender abdomen.  No palpable mass.  No distention.  MUSCULOSKELETAL: Normal body habitus.  NEUROLOGIC:  Alert, conversant.  PSYCHIATRIC:  Normal mood.    Emergency Department Course     ECG (09:07:24):  Rate 79 bpm. MT interval 140. QRS duration 76. QT/QTc 368/421. P-R-T axes 81 66 41. Normal sinus rhythm. Normal ECG. Interpreted at 0912 by Woody Duke MD.     Imaging:  Radiology findings were communicated with the patient who voiced understanding of the findings.    Chest XR, PA & LAT:  Heart size is normal. Pulmonary vasculature is not  distended. Lungs are clear. No pleural fluid. No acute disease.    As read by Radiology.      Laboratory:  Laboratory findings were communicated with the patient who voiced understanding of the findings.    CBC: WNL (WBC 5.4, HGB 13.7, )  BMP: Glucose 104 (H) o/w WNL (Creatinine 0.72)     Troponin (Collected 0937): <0.015    D-dimer: <0.3     Emergency Department Course:  Past medical records, nursing notes, and vitals reviewed.    0913 EKG obtained in the ED, see results above.     0923 I performed an exam of the patient as documented above.     0939 IV was inserted and blood was drawn for laboratory testing, results above.    1031 The patient was sent for a XR chest while in the emergency department, results above.     1130 I rechecked the patient and discussed the results of her " workup thus far.    1203 I rechecked the patient     Findings and plan explained to the Patient. Patient discharged home with instructions regarding supportive care, medications, and reasons to return. The importance of close follow-up was reviewed.     Impression & Plan     Medical Decision Making:  Sarah العلي is a 70 year old female present with chest discomfort and other associated symptoms that raise concern for angina. Gladly, the evaluation was negative and she remains symptoms free after this relatively brief episode this morning. She does has a heart score of 4 based on her history and evaluation. It does place her in a moderate risk category and I advised admission to the hospital for more urgent cardiac rule out, however, the patient much preferred to be discharge and have outpatient follow up including stress test. I strongly counseled the patient to return if she has recurrent concerning symptoms over the weekend and to follow up for her stress test, which I did order.     Diagnosis:    ICD-10-CM    1. Chest pain, unspecified type  R07.9 Exercise Stress Echocardiogram     Disposition:  Discharged to home.    Scribe Disclosure:  I, Ashley Shahid, am serving as a scribe at 9:05 AM on 8/28/2020 to document services personally performed by Woody Duke MD based on my observations and the provider's statements to me.        Woody Duke MD  08/28/20 1952

## 2020-08-28 NOTE — ED TRIAGE NOTES
Pt was awoke 0500with chest pain midsternal radiating out to rest of chest - intense pain - sharp - lasting 5 min - became diaphoretic thought she was going to pass out - pain went away - went to urgent care told to come to ER for eval

## 2020-08-28 NOTE — ED AVS SNAPSHOT
Emergency Department  [address] [place] [address]  Phone:  [phone]  Fax:  [phone]                                    Sarah العلي   MRN: [id number]    Department:   Emergency Department   Date of Visit:  8/28/2020           After Visit Summary Signature Page    I have received my discharge instructions, and my questions have been answered. I have discussed any challenges I see with this plan with the nurse or doctor.    ..........................................................................................................................................  Patient/Patient Representative Signature      ..........................................................................................................................................  Patient Representative Print Name and Relationship to Patient    ..................................................               ................................................  Date                                   Time    ..........................................................................................................................................  Reviewed by Signature/Title    ...................................................              ..............................................  Date                                               Time          22EPIC Rev 08/18

## 2020-08-28 NOTE — ED NOTES
Bed: ED13  Expected date:   Expected time:   Means of arrival:   Comments:  414  79 M chest pain/dizziness  0853

## 2020-09-01 ENCOUNTER — HOSPITAL ENCOUNTER (OUTPATIENT)
Dept: CARDIOLOGY | Facility: CLINIC | Age: 70
Discharge: HOME | End: 2020-09-01
Attending: EMERGENCY MEDICINE | Admitting: EMERGENCY MEDICINE
Payer: COMMERCIAL

## 2020-09-01 DIAGNOSIS — R07.9 CHEST PAIN, UNSPECIFIED TYPE: ICD-10-CM

## 2020-09-01 PROCEDURE — 93350 STRESS TTE ONLY: CPT | Mod: 26 | Performed by: INTERNAL MEDICINE

## 2020-09-01 PROCEDURE — 93016 CV STRESS TEST SUPVJ ONLY: CPT | Performed by: INTERNAL MEDICINE

## 2020-09-01 PROCEDURE — 93321 DOPPLER ECHO F-UP/LMTD STD: CPT | Mod: 26 | Performed by: INTERNAL MEDICINE

## 2020-09-01 PROCEDURE — 93018 CV STRESS TEST I&R ONLY: CPT | Performed by: INTERNAL MEDICINE

## 2020-09-01 PROCEDURE — 25500064 ZZH RX 255 OP 636: Performed by: INTERNAL MEDICINE

## 2020-09-01 PROCEDURE — 93325 DOPPLER ECHO COLOR FLOW MAPG: CPT | Mod: 26 | Performed by: INTERNAL MEDICINE

## 2020-09-01 RX ADMIN — PERFLUTREN 5 ML: 6.52 INJECTION, SUSPENSION INTRAVENOUS at 08:06

## 2020-09-02 ENCOUNTER — TELEPHONE (OUTPATIENT)
Dept: CARDIOLOGY | Facility: CLINIC | Age: 70
End: 2020-09-02

## 2020-09-02 NOTE — TELEPHONE ENCOUNTER
Wellness Screening Tool    Symptom Screening:    Do you have one of the following NEW symptoms:      Fever (subjective or >100.0)?  No    New cough? No    Shortness of breath? No    Chills? No    New loss of taste or smell? No    Generalized body aches? No    New persistent headache? No    New sore throat? No    Nausea, vomiting or diarrhea? No    Within the past 2 weeks, have you been exposed to someone with a known positive illness below?      COVID - 19 (known or suspected) No    Chicken pox?  No    Measles? No    Pertussis? No    Have you had a positive COVID-19 diagnostic test (nasal swab test) in the last 14 days or are you currently   on self-quarantine restrictions (i.e.travel restriction, exposure, etc?) No        Patient notified of visitor restriction: Yes  Patient informed to wear a mask: Yes    Patient's appointment status: Patient will be seen in clinic as scheduled on 9/3/20

## 2020-09-03 ENCOUNTER — OFFICE VISIT (OUTPATIENT)
Dept: CARDIOLOGY | Facility: CLINIC | Age: 70
End: 2020-09-03
Payer: COMMERCIAL

## 2020-09-03 VITALS
WEIGHT: 127.54 LBS | SYSTOLIC BLOOD PRESSURE: 122 MMHG | HEART RATE: 72 BPM | DIASTOLIC BLOOD PRESSURE: 77 MMHG | HEIGHT: 67 IN | BODY MASS INDEX: 20.02 KG/M2

## 2020-09-03 DIAGNOSIS — Z13.220 SCREENING FOR HYPERLIPIDEMIA: ICD-10-CM

## 2020-09-03 DIAGNOSIS — E78.5 HYPERLIPIDEMIA LDL GOAL <130: ICD-10-CM

## 2020-09-03 DIAGNOSIS — I27.20 PULMONARY HYPERTENSION (H): Primary | ICD-10-CM

## 2020-09-03 PROCEDURE — 99204 OFFICE O/P NEW MOD 45 MIN: CPT | Performed by: INTERNAL MEDICINE

## 2020-09-03 RX ORDER — OMEPRAZOLE 10 MG/1
20 CAPSULE, DELAYED RELEASE ORAL PRN
COMMUNITY
End: 2022-10-04

## 2020-09-03 ASSESSMENT — MIFFLIN-ST. JEOR: SCORE: 1131.15

## 2020-09-03 NOTE — LETTER
9/3/2020    Alyson Unger MD  [address] [address]    RE: Sarah العلي       Dear Colleague,    I had the pleasure of seeing Sarah العلي in the [facility].    HPI and Plan:   See dictation [id number]    Orders Placed This Encounter   Procedures     CT Coronary Calcium Scan     Lipid Profile     Follow-Up with Cardiologist     Echocardiogram Complete     Orders Placed This Encounter   Medications     omeprazole (PRILOSEC) 10 MG DR capsule     Sig: Take 20 mg by mouth as needed     Medications Discontinued During This Encounter   Medication Reason     aspirin 81 MG tablet Medication Reconciliation Clean Up     RaNITidine HCl (ZANTAC PO) Medication Reconciliation Clean Up     camphor-menthol (DERMASARRA) 0.5-0.5 % external lotion Medication Reconciliation Clean Up     Eyelid Cleansers (OCUSOFT LID SCRUB) PADS Medication Reconciliation Clean Up     carboxymethylcellulose PF (CARBOXYMETHYLCELLULOSE SODIUM) 0.5 % ophthalmic solution Medication Reconciliation Clean Up         Encounter Diagnoses   Name Primary?     Pulmonary hypertension (H) Yes     Screening for hyperlipidemia      Hyperlipidemia LDL goal <130        CURRENT MEDICATIONS:  Current Outpatient Medications   Medication Sig Dispense Refill     albuterol (PROAIR HFA/PROVENTIL HFA/VENTOLIN HFA) 108 (90 Base) MCG/ACT inhaler Inhale 2 puffs into the lungs as needed 8.5 g 3     Cholecalciferol (VITAMIN D3) 1000 UNITS CAPS Take 1 capsule by mouth daily       fluocinonide (LIDEX) 0.05 % gel Apply topically 2 times daily       methylcellulose (CITRUCEL) 500 MG TABS tablet        omeprazole (PRILOSEC) 10 MG DR capsule Take 20 mg by mouth as needed       valACYclovir (VALTREX) 1000 mg tablet Take 2 tablets by mouth 2 times daily as needed  2       ALLERGIES     Allergies   Allergen Reactions     Atorvastatin Rash     Nickel Rash     Other reaction(s): *Unknown  Other reaction(s): *Unknown       PAST MEDICAL  "HISTORY:  Past Medical History:   Diagnosis Date     Age-related osteoporosis without current pathological fracture 2019     Benign positional vertigo decades     Bilateral hearing loss, unspecified hearing loss type 2018     CARDIOVASCULAR SCREENING; LDL GOAL LESS THAN 160 2013     Cerebral aneurysm 2017     Erythema multiforme 2016     GERD (gastroesophageal reflux disease)      Hearing problem '04 & \"18    SNHL mild L ear;moderate-severe R ear  >75% loss word compre     Hematuria 2016    Overview:  Has had urologic evaluation     Laryngospasm 2016     Lichen planus      Migraine     with auora     Migraines 2000    Ocular migraine/ Migraine with Aura     Ocular migraine 2016     Oral lichen planus 2016     Osteopenia 2010     Osteopenia of left hip 2018     Physical exam 2016    Overview:  Full code.  Would want her  and son to make medical decisions for her if she were unable to do so.  Does not have an advanced directive.     Overview:  Diet - tries to eat a healthy diet  Exercise - \"I'm a fair weather walker\"  Active during the day Mammogram - 10/2017  Colonoscopy - 2008 - next in   DXA - 2015 - next in   Immunizations -  Up to date      Right internal carotid artery aneurysm 2018    5 mm     Sensorineural hearing loss  &  worsened     Tinnitus 2018    R ear only     White coat syndrome without diagnosis of hypertension 2018       PAST SURGICAL HISTORY:  Past Surgical History:   Procedure Laterality Date     DILATION AND CURETTAGE       LAPAROSCOPY       TONSILLECTOMY       TONSILLECTOMY & ADENOIDECTOMY         FAMILY HISTORY:  Family History   Problem Relation Age of Onset     Cerebrovascular Disease Father          CVA      Other - See Comments Mother          58 scleroderma     Heart Failure Maternal Grandfather      Heart Disease Maternal Grandfather         CHF     Diabetes Maternal " Grandmother         Type 1     Cancer Paternal Grandmother         Endometrial CA of uterus     Breast Cancer Paternal Aunt      Diabetes Cousin         Type 1     Glaucoma No family hx of      Macular Degeneration No family hx of        SOCIAL HISTORY:  Social History     Socioeconomic History     Marital status:      Spouse name: None     Number of children: None     Years of education: None     Highest education level: None   Occupational History     Occupation: RN     Comment: [employer]   Social Needs     Financial resource strain: None     Food insecurity     Worry: None     Inability: None     Transportation needs     Medical: None     Non-medical: None   Tobacco Use     Smoking status: Former Smoker     Packs/day: 0.50     Years: 12.00     Pack years: 6.00     Types: Cigarettes     Start date: 1968     Last attempt to quit: 1980     Years since quittin.7     Smokeless tobacco: Never Used   Substance and Sexual Activity     Alcohol use: Not Currently     Alcohol/week: 1.0 - 2.0 standard drinks     Comment: Minimal 1 glass wine approx. 2x/month     Drug use: No     Sexual activity: Yes     Partners: Male   Lifestyle     Physical activity     Days per week: None     Minutes per session: None     Stress: None   Relationships     Social connections     Talks on phone: None     Gets together: None     Attends Taoism service: None     Active member of club or organization: None     Attends meetings of clubs or organizations: None     Relationship status: None     Intimate partner violence     Fear of current or ex partner: None     Emotionally abused: None     Physically abused: None     Forced sexual activity: None   Other Topics Concern     Parent/sibling w/ CABG, MI or angioplasty before 65F 55M? 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 3 x's    How much calcium per day? diet       How much caffeine per day? 2-3 cups    How much vitamin D per day? 2000 iu    Do you/your family wear seatbelts?  Yes    Do you/your family use safety helmets? n/a    Do you/your family use sunscreen? Yes    Do you/your family keep firearms in the home? No    Do you/your family have a smoke detector(s)? Yes        Do you feel safe in your home? Yes    Has anyone ever touched you in an unwanted manner? No     Explain         August 4, 2015 Alina Grant LPN               Review of Systems:  Skin:  Negative     Eyes:  Negative    ENT:  Negative    Respiratory:  Negative    Cardiovascular:  Negative    Gastroenterology: Negative    Genitourinary:  Negative    Musculoskeletal:  Negative    Neurologic:  Negative    Psychiatric:  Negative    Heme/Lymph/Imm:  Negative    Endocrine:  Negative      Physical Exam:  Vitals: /77   Pulse 72   Ht 1.702 m (5' 7\")   Wt 57.9 kg (127 lb 8.6 oz)   BMI 19.98 kg/m      Recent Lab Results:  LIPID RESULTS:  Lab Results   Component Value Date    CHOL 216 (H) 08/10/2015    HDL 83 08/10/2015     08/10/2015    TRIG 118 08/10/2015    CHOLHDLRATIO 2.6 08/10/2015       LIVER ENZYME RESULTS:  No results found for: AST, ALT    CBC RESULTS:  Lab Results   Component Value Date    WBC 5.4 08/28/2020    RBC 4.63 08/28/2020    HGB 13.7 08/28/2020    HCT 41.3 08/28/2020    MCV 89 08/28/2020    MCH 29.6 08/28/2020    MCHC 33.2 08/28/2020    RDW 12.5 08/28/2020     08/28/2020       BMP RESULTS:  Lab Results   Component Value Date     08/28/2020    POTASSIUM 4.0 08/28/2020    CHLORIDE 103 08/28/2020    CO2 27 08/28/2020    ANIONGAP 3 08/28/2020     (H) 08/28/2020    BUN 20 08/28/2020    CR 0.72 08/28/2020    GFRESTIMATED 85 08/28/2020    GFRESTBLACK >90 08/28/2020    MARCIO 9.2 08/28/2020        A1C RESULTS:  No results found for: A1C    INR RESULTS:  No results found for: INR        CC  Alyson Unger MD  [address] [address]                    Thank you for allowing me to participate in the care of your patient.      Sincerely,     Asher Sotelo MD     [facility]    cc:   Alyson Unger MD  [address] [address]

## 2020-09-03 NOTE — PROGRESS NOTES
HPI and Plan:   See dictation [id number]    Orders Placed This Encounter   Procedures     CT Coronary Calcium Scan     Lipid Profile     Follow-Up with Cardiologist     Echocardiogram Complete     Orders Placed This Encounter   Medications     omeprazole (PRILOSEC) 10 MG DR capsule     Sig: Take 20 mg by mouth as needed     Medications Discontinued During This Encounter   Medication Reason     aspirin 81 MG tablet Medication Reconciliation Clean Up     RaNITidine HCl (ZANTAC PO) Medication Reconciliation Clean Up     camphor-menthol (DERMASARRA) 0.5-0.5 % external lotion Medication Reconciliation Clean Up     Eyelid Cleansers (OCUSOFT LID SCRUB) PADS Medication Reconciliation Clean Up     carboxymethylcellulose PF (CARBOXYMETHYLCELLULOSE SODIUM) 0.5 % ophthalmic solution Medication Reconciliation Clean Up         Encounter Diagnoses   Name Primary?     Pulmonary hypertension (H) Yes     Screening for hyperlipidemia      Hyperlipidemia LDL goal <130        CURRENT MEDICATIONS:  Current Outpatient Medications   Medication Sig Dispense Refill     albuterol (PROAIR HFA/PROVENTIL HFA/VENTOLIN HFA) 108 (90 Base) MCG/ACT inhaler Inhale 2 puffs into the lungs as needed 8.5 g 3     Cholecalciferol (VITAMIN D3) 1000 UNITS CAPS Take 1 capsule by mouth daily       fluocinonide (LIDEX) 0.05 % gel Apply topically 2 times daily       methylcellulose (CITRUCEL) 500 MG TABS tablet        omeprazole (PRILOSEC) 10 MG DR capsule Take 20 mg by mouth as needed       valACYclovir (VALTREX) 1000 mg tablet Take 2 tablets by mouth 2 times daily as needed  2       ALLERGIES     Allergies   Allergen Reactions     Atorvastatin Rash     Nickel Rash     Other reaction(s): *Unknown  Other reaction(s): *Unknown       PAST MEDICAL HISTORY:  Past Medical History:   Diagnosis Date     Age-related osteoporosis without current pathological fracture 2/22/2019     Benign positional vertigo decades     Bilateral hearing loss, unspecified hearing loss type  "2018     CARDIOVASCULAR SCREENING; LDL GOAL LESS THAN 160 2013     Cerebral aneurysm 2017     Erythema multiforme 2016     GERD (gastroesophageal reflux disease)      Hearing problem '04 & \"18    SNHL mild L ear;moderate-severe R ear  >75% loss word compre     Hematuria 2016    Overview:  Has had urologic evaluation     Laryngospasm 2016     Lichen planus      Migraine     with auora     Migraines 2000    Ocular migraine/ Migraine with Aura     Ocular migraine 2016     Oral lichen planus 2016     Osteopenia      Osteopenia of left hip 2018     Physical exam 2016    Overview:  Full code.  Would want her  and son to make medical decisions for her if she were unable to do so.  Does not have an advanced directive.     Overview:  Diet - tries to eat a healthy diet  Exercise - \"I'm a fair weather walker\"  Active during the day Mammogram - 10/2017  Colonoscopy - 2008 - next in   DXA - 2015 - next in   Immunizations -  Up to date      Right internal carotid artery aneurysm 2018    5 mm     Sensorineural hearing loss  &  worsened     Tinnitus 2018    R ear only     White coat syndrome without diagnosis of hypertension 2018       PAST SURGICAL HISTORY:  Past Surgical History:   Procedure Laterality Date     DILATION AND CURETTAGE       LAPAROSCOPY       TONSILLECTOMY       TONSILLECTOMY & ADENOIDECTOMY         FAMILY HISTORY:  Family History   Problem Relation Age of Onset     Cerebrovascular Disease Father          CVA      Other - See Comments Mother          58 scleroderma     Heart Failure Maternal Grandfather      Heart Disease Maternal Grandfather         CHF     Diabetes Maternal Grandmother         Type 1     Cancer Paternal Grandmother         Endometrial CA of uterus     Breast Cancer Paternal Aunt      Diabetes Cousin         Type 1     Glaucoma No family hx of      Macular Degeneration No family " hx of        SOCIAL HISTORY:  Social History     Socioeconomic History     Marital status:      Spouse name: None     Number of children: None     Years of education: None     Highest education level: None   Occupational History     Occupation: RN     Comment: [employer]   Social Needs     Financial resource strain: None     Food insecurity     Worry: None     Inability: None     Transportation needs     Medical: None     Non-medical: None   Tobacco Use     Smoking status: Former Smoker     Packs/day: 0.50     Years: 12.00     Pack years: 6.00     Types: Cigarettes     Start date: 1968     Last attempt to quit: 1980     Years since quittin.7     Smokeless tobacco: Never Used   Substance and Sexual Activity     Alcohol use: Not Currently     Alcohol/week: 1.0 - 2.0 standard drinks     Comment: Minimal 1 glass wine approx. 2x/month     Drug use: No     Sexual activity: Yes     Partners: Male   Lifestyle     Physical activity     Days per week: None     Minutes per session: None     Stress: None   Relationships     Social connections     Talks on phone: None     Gets together: None     Attends Confucianist service: None     Active member of club or organization: None     Attends meetings of clubs or organizations: None     Relationship status: None     Intimate partner violence     Fear of current or ex partner: None     Emotionally abused: None     Physically abused: None     Forced sexual activity: None   Other Topics Concern     Parent/sibling w/ CABG, MI or angioplasty before 65F 55M? 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 3 x's    How much calcium per day? diet       How much caffeine per day? 2-3 cups    How much vitamin D per day? 2000 iu    Do you/your family wear seatbelts?  Yes    Do you/your family use safety helmets? n/a    Do  "you/your family use sunscreen? Yes    Do you/your family keep firearms in the home? No    Do you/your family have a smoke detector(s)? Yes        Do you feel safe in your home? Yes    Has anyone ever touched you in an unwanted manner? No     Explain         August 4, 2015 Alina Grant LPN               Review of Systems:  Skin:  Negative     Eyes:  Negative    ENT:  Negative    Respiratory:  Negative    Cardiovascular:  Negative    Gastroenterology: Negative    Genitourinary:  Negative    Musculoskeletal:  Negative    Neurologic:  Negative    Psychiatric:  Negative    Heme/Lymph/Imm:  Negative    Endocrine:  Negative      Physical Exam:  Vitals: /77   Pulse 72   Ht 1.702 m (5' 7\")   Wt 57.9 kg (127 lb 8.6 oz)   BMI 19.98 kg/m      Recent Lab Results:  LIPID RESULTS:  Lab Results   Component Value Date    CHOL 216 (H) 08/10/2015    HDL 83 08/10/2015     08/10/2015    TRIG 118 08/10/2015    CHOLHDLRATIO 2.6 08/10/2015       LIVER ENZYME RESULTS:  No results found for: AST, ALT    CBC RESULTS:  Lab Results   Component Value Date    WBC 5.4 08/28/2020    RBC 4.63 08/28/2020    HGB 13.7 08/28/2020    HCT 41.3 08/28/2020    MCV 89 08/28/2020    MCH 29.6 08/28/2020    MCHC 33.2 08/28/2020    RDW 12.5 08/28/2020     08/28/2020       BMP RESULTS:  Lab Results   Component Value Date     08/28/2020    POTASSIUM 4.0 08/28/2020    CHLORIDE 103 08/28/2020    CO2 27 08/28/2020    ANIONGAP 3 08/28/2020     (H) 08/28/2020    BUN 20 08/28/2020    CR 0.72 08/28/2020    GFRESTIMATED 85 08/28/2020    GFRESTBLACK >90 08/28/2020    MARCIO 9.2 08/28/2020        A1C RESULTS:  No results found for: A1C    INR RESULTS:  No results found for: INR        CC  Alyson Unger MD  [address] [address]                  "

## 2020-09-03 NOTE — LETTER
9/3/2020      Alyson Unger MD  [address]  [facility] [address]      RE: Sarah STILES Severiano       Dear Colleague,    I had the pleasure of seeing Sarah STILES Severiano in the [facility].    Service Date: 09/03/2020      REASON FOR VISIT:  Chest discomfort.      HISTORY OF PRESENT ILLNESS:  This is a very pleasant 70-year-old female who is here with her significant other.  They live in [place].  The patient has a history of GERD for many years.  She takes omeprazole for that.  She denies any diabetes, hypertension, family history of coronary disease.  No strokes.  She had a history of amaurosis fugax that has never recurred.  She had an echocardiogram in 2017 that was negative for any major abnormalities.  No prior cardiac history.      She says that about 7 days ago she woke up at 5 a.m. in the morning with significant substernal chest discomfort.  This was happening when she was lying flat.  When she stood up and went to the bathroom, she felt a little lightheaded and nauseous.  However, in 10 minutes, the symptoms resolved and she was standing up.  She is attributing this to esophageal spasm or probably some GERD related symptoms.  She says the night prior she had eaten spicy food.  She also had an episode of choking the night prior.  She is normally functionally very active.  She walks around and has 3 flights of stairs in her house that she climbs without any cardiovascular symptoms.  She has no recurrence of chest discomfort ever since that episode 7 days ago.  She went to the emergency department for this chest discomfort that day and had a negative troponin and normal labs.  ECG was normal as well.  She was recommended an inpatient to rule out, but she decided to go home and subsequently had an exercise stress echocardiogram done at [facility].  Target heart rate was achieved.  However, she exercised only for 2 minutes on the bike given leg fatigue and  mild shortness of breath.  The stress echocardiogram was normal at target heart rate but showed mild exercise-induced pulmonary hypertension.      PHYSICAL EXAMINATION:   VITAL SIGNS:  Blood pressure is 122/77, pulse of 72.   GENERAL:  Alert and oriented x3, in no acute distress.   NECK:  Supple.  JVP normal.   LUNGS:  Clear.   CARDIAC:  Cardiac sounds are normal.  No murmur, rubs or gallops.   EXTREMITIES:  Warm without edema.   PSYCHIATRIC:  Appropriate affect.   HEENT:  No icterus, pallor.      ASSESSMENT AND PLAN:  Sarah is a very pleasant 70-year-old female with atypical chest discomfort very likely sounding from GERD.  She had a negative stress echocardiogram.  No recurrence of pain.  Pain is not exertional  either.  At this time, I have told her to watch.  Conservative management recommended.  If pain recurs, then I would recommend a CT angiography.  However, at this time, I think this is GERD-related pain.  She is in agreement with the plan.      She was wondering about rechecking her cholesterol and was interested in getting a calcium scan.  I will order both of them.      Lastly, she does have mild exercise-induced pulmonary hypertension, probably mild degree of diastolic heart failure, but no clinical evidence of volume overload at rest.  This might explain her exertional fatigue.  I doubt this is going to be an issue, but we will repeat an echocardiogram in about a year to ensure PA pressures are normal.     I can see her back at that time.  If the calcium score is abnormal, then we will talk about further medications.  She, in the past, had a rash on Lipitor she says.  We may try a low dose of Crestor if her calcium score is positive.      cc:   Alyson Unger MD   [facility]   [address] [place]  [address]         CHELSIE KOHLER MD             D: 2020   T: 2020   MT: mo      Name:     SARAH HENSON   MRN:      [id number]        Account:      [id number]   :       1950           Service Date: 09/03/2020      Document: [id number]           Outpatient Encounter Medications as of 9/3/2020   Medication Sig Dispense Refill     albuterol (PROAIR HFA/PROVENTIL HFA/VENTOLIN HFA) 108 (90 Base) MCG/ACT inhaler Inhale 2 puffs into the lungs as needed 8.5 g 3     Cholecalciferol (VITAMIN D3) 1000 UNITS CAPS Take 1 capsule by mouth daily       fluocinonide (LIDEX) 0.05 % gel Apply topically 2 times daily       methylcellulose (CITRUCEL) 500 MG TABS tablet        omeprazole (PRILOSEC) 10 MG DR capsule Take 20 mg by mouth as needed       valACYclovir (VALTREX) 1000 mg tablet Take 2 tablets by mouth 2 times daily as needed  2     [DISCONTINUED] aspirin 81 MG tablet Take 81 mg by mouth daily       [DISCONTINUED] camphor-menthol (DERMASARRA) 0.5-0.5 % external lotion Apply every 4 hours as needed for itch 222 mL 11     [DISCONTINUED] carboxymethylcellulose PF (CARBOXYMETHYLCELLULOSE SODIUM) 0.5 % ophthalmic solution Place 1 drop into both eyes 4 times daily (Patient not taking: Reported on 9/3/2020) 1 Bottle 11     [DISCONTINUED] Eyelid Cleansers (OCUSOFT LID SCRUB) PADS Externally apply 1 Application topically 2 times daily 60 each 11     [DISCONTINUED] RaNITidine HCl (ZANTAC PO) Take 150 mg by mouth as needed for heartburn       No facility-administered encounter medications on file as of 9/3/2020.        Again, thank you for allowing me to participate in the care of your patient.      Sincerely,    Asher Sotelo MD     [facility]

## 2020-09-03 NOTE — PROGRESS NOTES
Service Date: 09/03/2020      REASON FOR VISIT:  Chest discomfort.      HISTORY OF PRESENT ILLNESS:  This is a very pleasant 70-year-old female who is here with her significant other.  They live in [place].  The patient has a history of GERD for many years.  She takes omeprazole for that.  She denies any diabetes, hypertension, family history of coronary disease.  No strokes.  She had a history of amaurosis fugax that has never recurred.  She had an echocardiogram in 2017 that was negative for any major abnormalities.  No prior cardiac history.      She says that about 7 days ago she woke up at 5 a.m. in the morning with significant substernal chest discomfort.  This was happening when she was lying flat.  When she stood up and went to the bathroom, she felt a little lightheaded and nauseous.  However, in 10 minutes, the symptoms resolved and she was standing up.  She is attributing this to esophageal spasm or probably some GERD related symptoms.  She says the night prior she had eaten spicy food.  She also had an episode of choking the night prior.  She is normally functionally very active.  She walks around and has 3 flights of stairs in her house that she climbs without any cardiovascular symptoms.  She has no recurrence of chest discomfort ever since that episode 7 days ago.  She went to the emergency department for this chest discomfort that day and had a negative troponin and normal labs.  ECG was normal as well.  She was recommended an inpatient to rule out, but she decided to go home and subsequently had an exercise stress echocardiogram done at [facility].  Target heart rate was achieved.  However, she exercised only for 2 minutes on the bike given leg fatigue and mild shortness of breath.  The stress echocardiogram was normal at target heart rate but showed mild exercise-induced pulmonary hypertension.      PHYSICAL EXAMINATION:   VITAL SIGNS:  Blood pressure is 122/77, pulse of 72.    GENERAL:  Alert and oriented x3, in no acute distress.   NECK:  Supple.  JVP normal.   LUNGS:  Clear.   CARDIAC:  Cardiac sounds are normal.  No murmur, rubs or gallops.   EXTREMITIES:  Warm without edema.   PSYCHIATRIC:  Appropriate affect.   HEENT:  No icterus, pallor.      ASSESSMENT AND PLAN:  Sarah is a very pleasant 70-year-old female with atypical chest discomfort very likely sounding from GERD.  She had a negative stress echocardiogram.  No recurrence of pain.  Pain is not exertional  either.  At this time, I have told her to watch.  Conservative management recommended.  If pain recurs, then I would recommend a CT angiography.  However, at this time, I think this is GERD-related pain.  She is in agreement with the plan.      She was wondering about rechecking her cholesterol and was interested in getting a calcium scan.  I will order both of them.      Lastly, she does have mild exercise-induced pulmonary hypertension, probably mild degree of diastolic heart failure, but no clinical evidence of volume overload at rest.  This might explain her exertional fatigue.  I doubt this is going to be an issue, but we will repeat an echocardiogram in about a year to ensure PA pressures are normal.     I can see her back at that time.  If the calcium score is abnormal, then we will talk about further medications.  She, in the past, had a rash on Lipitor she says.  We may try a low dose of Crestor if her calcium score is positive.      cc:   Alyson Unger MD   [facility]   [address] [place]  [address]         CHELSIE KOHLER MD             D: 2020   T: 2020   MT: mo      Name:     SARAH HENSON   MRN:      [id number]        Account:      [id number]   :      1950           Service Date: 2020      Document: [id number]

## 2020-09-17 ENCOUNTER — TELEPHONE (OUTPATIENT)
Dept: RADIOLOGY | Facility: CLINIC | Age: 70
End: 2020-09-17

## 2020-09-17 NOTE — TELEPHONE ENCOUNTER
PATIENT WELLNESS TELEPHONE SCREENING     Step 1 Screening Questions    In the past 3 weeks, have you been exposed to someone with a suspected or known illness?  COVID-19? No  Chickenpox? No   Measles? No  Pertussis? No    Do you have one of the following NEW symptoms?   Fever/Chills? No   Cough? No   Shortness of breath? No   New loss of taste or smell? No  Sore throat? No  Muscle or body aches? No  Headaches? No  Fatigue? No  Vomiting or diarrhea? No    Have you had a positive COVID-19 diagnostic test (nasal swab test) in the last 14 days or are you currently on self-quarantine restrictions (i.e. travel restrictions, exposures, etc.)?No    Step 2 Screening Results (Skip if the patient is negative for symptoms), If Yes:    Due to your current symptoms, for our safety we need to cancel your appointment. We are advising that you consult with your ordering provider or [facility] at [phone].    Step 3 Review Visitor Policy  Patient informed of the updated visitor policy   1 visitor allowed per patient   Visitor must screen negative for COVID symptoms   Visitor must wear a mask

## 2020-09-18 ENCOUNTER — HOSPITAL ENCOUNTER (OUTPATIENT)
Dept: CARDIOLOGY | Facility: CLINIC | Age: 70
Discharge: HOME | End: 2020-09-18
Attending: INTERNAL MEDICINE | Admitting: INTERNAL MEDICINE
Payer: COMMERCIAL

## 2020-09-18 DIAGNOSIS — I27.20 PULMONARY HYPERTENSION (H): ICD-10-CM

## 2020-09-18 DIAGNOSIS — E78.5 HYPERLIPIDEMIA LDL GOAL <130: ICD-10-CM

## 2020-09-18 DIAGNOSIS — Z13.220 SCREENING FOR HYPERLIPIDEMIA: ICD-10-CM

## 2020-09-18 LAB
CHOLEST SERPL-MCNC: 216 MG/DL
HDLC SERPL-MCNC: 87 MG/DL
LDLC SERPL CALC-MCNC: 113 MG/DL
NONHDLC SERPL-MCNC: 129 MG/DL
TRIGL SERPL-MCNC: 80 MG/DL

## 2020-09-18 PROCEDURE — 36415 COLL VENOUS BLD VENIPUNCTURE: CPT | Performed by: INTERNAL MEDICINE

## 2020-09-18 PROCEDURE — 75571 CT HRT W/O DYE W/CA TEST: CPT | Mod: GA

## 2020-09-18 PROCEDURE — 80061 LIPID PANEL: CPT | Performed by: INTERNAL MEDICINE

## 2020-09-18 PROCEDURE — 75571 CT HRT W/O DYE W/CA TEST: CPT | Mod: 26 | Performed by: INTERNAL MEDICINE

## 2020-09-21 ENCOUNTER — MYC MEDICAL ADVICE (OUTPATIENT)
Dept: CARDIOLOGY | Facility: CLINIC | Age: 70
End: 2020-09-21

## 2020-09-21 NOTE — TELEPHONE ENCOUNTER
"Routed patient's MyChart to Dr. Sotelo.     I did tell patient it appears she doesn't have true pulmonary hypertension, but just a mild degree of diastolic HF and exercise-induced mild pulmonary hypertension which likely contribute to her symptoms. I suggested she contact her PCP first to discuss the lung findings - if PCP wants to refer to Pulmonology that would be ok. Repeat Echo in 1 year.    I think patient will want to hear Dr. Sotelo's thoughts. Sheree Mar RN on 9/21/2020 at 11:16 AM        Dr. Sotelo:    Thank you for your consult on 9/3/2020. I completed the Coronary Calcium Scan on 9/18/20. I have a couple of questions regarding the results, as follows:     (1) Re: Pulmonary Hypertension:            Is this a definite/confirmed diagnosis that I need to add to my medical history problem list?            I don't remember what indicated this on my echocardiogram.            Is further testing needed to either confirm a diagnosis or determine a level of severity, as I understand this is a serious, progressive disease.    (2) Re: Radiology consult for cardiology:           The \"linear opacity in the lower left lobe corresponds to atelectasis or scar formation\", FYI, I had a respiratory illness this winter diagnosed as walking pneumonia, could this be residual from that, or part of pulmonary hpertension disease process or could this be a separate respiratory issue?          How do we determine if atelectasis or scarring? Does this need treatment? Should I see a pulmonologist?    (3) My family history includes my mother who had systemic sclerosis. Since I also have developed Raynaud's phenomenon, GE reflux and now, pulmonary hypertension, could these all be symptoms of a larger picture?    Should I see a Rheumatologist to determine?     Thank you for your time and expertise. I look forward to your response.  "

## 2020-09-22 NOTE — TELEPHONE ENCOUNTER
Yes see pulm if there is FH.    Agree with your recs to her - However, can I do a quick virtual visit with her in the next week or so? Thanks.

## 2020-09-23 NOTE — TELEPHONE ENCOUNTER
Asher Sotelo MD  You 23 hours ago (11:06 AM)          Yes see pulm if there is FH.     Agree with your recs to her - However, can I do a quick virtual visit with her in the next week or so? Thanks          Replied to patient's [employer] message and provided scheduling phone number for patient to call to arrange a virtual visit with Dr. Sotelo in the near future. Dr. Sotelo has virtual openings tomorrow. Sheree Mar RN on 9/23/2020 at 10:41 AM

## 2020-09-24 ENCOUNTER — VIRTUAL VISIT (OUTPATIENT)
Dept: CARDIOLOGY | Facility: CLINIC | Age: 70
End: 2020-09-24
Payer: COMMERCIAL

## 2020-09-24 DIAGNOSIS — R07.9 CHEST PAIN, UNSPECIFIED TYPE: Primary | ICD-10-CM

## 2020-09-24 DIAGNOSIS — R93.89 ABNORMAL FINDING ON IMAGING: Primary | ICD-10-CM

## 2020-09-24 PROCEDURE — 99212 OFFICE O/P EST SF 10 MIN: CPT | Mod: 95 | Performed by: INTERNAL MEDICINE

## 2020-09-24 NOTE — PROGRESS NOTES
"Sarah العلي is a 70 year old female who is being evaluated via a billable telephone visit.      The patient has been notified of following:     \"This telephone visit will be conducted via a call between you and your physician/provider. We have found that certain health care needs can be provided without the need for a physical exam.  This service lets us provide the care you need with a short phone conversation.  If a prescription is necessary we can send it directly to your pharmacy.  If lab work is needed we can place an order for that and you can then stop by our lab to have the test done at a later time.    Telephone visits are billed at different rates depending on your insurance coverage. During this emergency period, for some insurers they may be billed the same as an in-person visit.  Please reach out to your insurance provider with any questions.    If during the course of the call the physician/provider feels a telephone visit is not appropriate, you will not be charged for this service.\"    Patient has given verbal consent for Telephone visit?  Yes    What phone number would you like to be contacted at? [phone]    How would you like to obtain your AVS? MyChart    Vitals reported by patient:  B/P: 126/73  HR: 84  Weight: 126 lbs    Review Of Systems  Skin: negative  Eyes: negative  Ears/Nose/Throat: negative  Respiratory: SOB (pt thinks its mental)  Cardiovascular: negative  Gastrointestinal: heartburn, reflux  Genitourinary: negative  Musculoskeletal: negative  Neurologic: negative  Psychiatric: negative  Hematologic/Lymphatic/Immunologic: negative  Endocrine: negative     Shelley Aly MA    Phone call duration: 8 minutes    Asher Sotelo MD    HPI and Plan:   This was a telephone visit at the patient's request because she wanted to discuss about her echo findings.  Please see my prior notes for details.  Total phone time was 8 minutes.  Sarah is 70 years of age without any prior " cardiovascular history.  She presented with atypical chest pain after eating something spicy which was thought to be GERD related and had a stress echo at [facility] that was negative however it was a low workload study.  Subsequently she was seen in consultation by me and at that point had no further cardiovascular symptoms and given the fact that it clinically sounded like reflux disease, we decided to conservatively manage.  Subsequently she has not had any significant symptoms particularly no chest pain syncope presyncope palpitations or shortness of breath.    She had requested getting a CT calcium score which was done and it was 0.  She was reassured about those findings today.    Lastly her echocardiogram had shown normal biventricular function with mild pulmonary hypertension with exercise.  Clinically she does not have heart failure.  It is not uncommon is 70 years of age especially her being woman to have some degree of diastolic dysfunction and mild exercise-induced pulmonary hypertension.  However at this time given that she is asymptomatic I would not do anything different.  We have decided to clinic back in a year to follow-up on things and we may decide to do a limited echocardiogram at that time.    Otherwise healthy diet lifestyle salt restriction and exercise recommended.  Please call us with any change in clinical status.  She is going to follow-up with pulmonary given her noncardiac CT findings.    No orders of the defined types were placed in this encounter.    No orders of the defined types were placed in this encounter.    Medications Discontinued During This Encounter   Medication Reason     valACYclovir (VALTREX) 1000 mg tablet Stopped by Patient         No diagnosis found.    CURRENT MEDICATIONS:  Current Outpatient Medications   Medication Sig Dispense Refill     albuterol (PROAIR HFA/PROVENTIL HFA/VENTOLIN HFA) 108 (90 Base) MCG/ACT inhaler Inhale 2 puffs into the lungs as  "needed 8.5 g 3     Cholecalciferol (VITAMIN D3) 1000 UNITS CAPS Take 1 capsule by mouth daily       fluocinonide (LIDEX) 0.05 % gel Apply topically 2 times daily       methylcellulose (CITRUCEL) 500 MG TABS tablet        omeprazole (PRILOSEC) 10 MG DR capsule Take 20 mg by mouth as needed         ALLERGIES     Allergies   Allergen Reactions     Atorvastatin Rash     Nickel Rash     Other reaction(s): *Unknown  Other reaction(s): *Unknown       PAST MEDICAL HISTORY:  Past Medical History:   Diagnosis Date     Age-related osteoporosis without current pathological fracture 2/22/2019     Benign positional vertigo decades     Bilateral hearing loss, unspecified hearing loss type 12/6/2018     CARDIOVASCULAR SCREENING; LDL GOAL LESS THAN 160 7/17/2013     Cerebral aneurysm 12/2017     Erythema multiforme 9/26/2016     GERD (gastroesophageal reflux disease)      Hearing problem '04 & \"18    SNHL mild L ear;moderate-severe R ear  >75% loss word compre     Hematuria 9/26/2016    Overview:  Has had urologic evaluation     Laryngospasm 9/26/2016     Lichen planus      Migraine     with auora     Migraines 2000    Ocular migraine/ Migraine with Aura     Ocular migraine 9/26/2016     Oral lichen planus 9/26/2016     Osteopenia 2010     Osteopenia of left hip 12/12/2018     Physical exam 9/26/2016    Overview:  Full code.  Would want her  and son to make medical decisions for her if she were unable to do so.  Does not have an advanced directive.     Overview:  Diet - tries to eat a healthy diet  Exercise - \"I'm a fair weather walker\"  Active during the day Mammogram - 10/2017  Colonoscopy - 2/2008 - next in 2018  DXA - 8/2015 - next in 2018  Immunizations -  Up to date      Right internal carotid artery aneurysm 12/6/2018    5 mm     Sensorineural hearing loss 2/04 & 4/18 2/18 worsened     Tinnitus 12/2018    R ear only     White coat syndrome without diagnosis of hypertension 12/6/2018       PAST SURGICAL " HISTORY:  Past Surgical History:   Procedure Laterality Date     DILATION AND CURETTAGE       LAPAROSCOPY       TONSILLECTOMY       TONSILLECTOMY & ADENOIDECTOMY         FAMILY HISTORY:  Family History   Problem Relation Age of Onset     Cerebrovascular Disease Father          CVA 2006     Other - See Comments Mother          58 scleroderma     Heart Failure Maternal Grandfather      Heart Disease Maternal Grandfather         CHF     Diabetes Maternal Grandmother         Type 1     Cancer Paternal Grandmother         Endometrial CA of uterus     Breast Cancer Paternal Aunt      Diabetes Cousin         Type 1     Glaucoma No family hx of      Macular Degeneration No family hx of        SOCIAL HISTORY:  Social History     Socioeconomic History     Marital status:      Spouse name: None     Number of children: None     Years of education: None     Highest education level: None   Occupational History     Occupation: RN     Comment: [employer]   Social Needs     Financial resource strain: None     Food insecurity     Worry: None     Inability: None     Transportation needs     Medical: None     Non-medical: None   Tobacco Use     Smoking status: Former Smoker     Packs/day: 0.50     Years: 12.00     Pack years: 6.00     Types: Cigarettes     Start date: 1968     Last attempt to quit: 1980     Years since quittin.7     Smokeless tobacco: Never Used   Substance and Sexual Activity     Alcohol use: Not Currently     Alcohol/week: 1.0 - 2.0 standard drinks     Comment: Minimal 1 glass wine approx. 2x/month     Drug use: No     Sexual activity: Yes     Partners: Male   Lifestyle     Physical activity     Days per week: None     Minutes per session: None     Stress: None   Relationships     Social connections     Talks on phone: None     Gets together: None     Attends [employer] service: None     Active member of club or organization: None     Attends meetings of clubs or  organizations: None     Relationship status: None     Intimate partner violence     Fear of current or ex partner: None     Emotionally abused: None     Physically abused: None     Forced sexual activity: None   Other Topics Concern     Parent/sibling w/ CABG, MI or angioplasty before 65F 55M? 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 3 x's    How much calcium per day? diet       How much caffeine per day? 2-3 cups    How much vitamin D per day? 2000 iu    Do you/your family wear seatbelts?  Yes    Do you/your family use safety helmets? n/a    Do you/your family use sunscreen? Yes    Do you/your family keep firearms in the home? No    Do you/your family have a smoke detector(s)? Yes        Do you feel safe in your home? Yes    Has anyone ever touched you in an unwanted manner? No     Explain         August 4, 2015 Alina Grant LPN               Review of Systems:  Skin:        Eyes:       ENT:       Respiratory:       Cardiovascular:       Gastroenterology:      Genitourinary:       Musculoskeletal:       Neurologic:       Psychiatric:       Heme/Lymph/Imm:       Endocrine:         Physical Exam:  Vitals: There were no vitals taken for this visit.    Recent Lab Results:  LIPID RESULTS:  Lab Results   Component Value Date    CHOL 216 (H) 09/18/2020    HDL 87 09/18/2020     (H) 09/18/2020    TRIG 80 09/18/2020    CHOLHDLRATIO 2.6 08/10/2015       LIVER ENZYME RESULTS:  No results found for: AST, ALT    CBC RESULTS:  Lab Results   Component Value Date    WBC 5.4 08/28/2020    RBC 4.63 08/28/2020    HGB 13.7 08/28/2020    HCT 41.3 08/28/2020    MCV 89 08/28/2020    MCH 29.6 08/28/2020    MCHC 33.2 08/28/2020    RDW 12.5 08/28/2020     08/28/2020       BMP RESULTS:  Lab Results   Component Value Date     08/28/2020    POTASSIUM 4.0 08/28/2020    CHLORIDE  103 08/28/2020    CO2 27 08/28/2020    ANIONGAP 3 08/28/2020     (H) 08/28/2020    BUN 20 08/28/2020    CR 0.72 08/28/2020    GFRESTIMATED 85 08/28/2020    GFRESTBLACK >90 08/28/2020    MARCIO 9.2 08/28/2020        A1C RESULTS:  No results found for: A1C    INR RESULTS:  No results found for: INR        CC  No referring provider defined for this encounter.

## 2020-09-24 NOTE — LETTER
"9/24/2020    Alyson Unger MD  [address] [address]    RE: Sarah العلي       Dear Colleague,    I had the pleasure of seeing Sarah العلي in the [facility].    Sarah العلي is a 70 year old female who is being evaluated via a billable telephone visit.      The patient has been notified of following:     \"This telephone visit will be conducted via a call between you and your physician/provider. We have found that certain health care needs can be provided without the need for a physical exam.  This service lets us provide the care you need with a short phone conversation.  If a prescription is necessary we can send it directly to your pharmacy.  If lab work is needed we can place an order for that and you can then stop by our lab to have the test done at a later time.    Telephone visits are billed at different rates depending on your insurance coverage. During this emergency period, for some insurers they may be billed the same as an in-person visit.  Please reach out to your insurance provider with any questions.    If during the course of the call the physician/provider feels a telephone visit is not appropriate, you will not be charged for this service.\"    Patient has given verbal consent for Telephone visit?  Yes    What phone number would you like to be contacted at? [phone]    How would you like to obtain your AVS? [facility]    Vitals reported by patient:  B/P: 126/73  HR: 84  Weight: 126 lbs    Review Of Systems  Skin: negative  Eyes: negative  Ears/Nose/Throat: negative  Respiratory: SOB (pt thinks its mental)  Cardiovascular: negative  Gastrointestinal: heartburn, reflux  Genitourinary: negative  Musculoskeletal: negative  Neurologic: negative  Psychiatric: negative  Hematologic/Lymphatic/Immunologic: negative  Endocrine: negative     Shelley Aly MA    Phone call duration: 8 minutes    Asher Sotelo MD    HPI and Plan: "   This was a telephone visit at the patient's request because she wanted to discuss about her echo findings.  Please see my prior notes for details.  Total phone time was 8 minutes.  Sarah is 70 years of age without any prior cardiovascular history.  She presented with atypical chest pain after eating something spicy which was thought to be GERD related and had a stress echo at [facility] that was negative however it was a low workload study.  Subsequently she was seen in consultation by me and at that point had no further cardiovascular symptoms and given the fact that it clinically sounded like reflux disease, we decided to conservatively manage.  Subsequently she has not had any significant symptoms particularly no chest pain syncope presyncope palpitations or shortness of breath.    She had requested getting a CT calcium score which was done and it was 0.  She was reassured about those findings today.    Lastly her echocardiogram had shown normal biventricular function with mild pulmonary hypertension with exercise.  Clinically she does not have heart failure.  It is not uncommon is 70 years of age especially her being woman to have some degree of diastolic dysfunction and mild exercise-induced pulmonary hypertension.  However at this time given that she is asymptomatic I would not do anything different.  We have decided to clinic back in a year to follow-up on things and we may decide to do a limited echocardiogram at that time.    Otherwise healthy diet lifestyle salt restriction and exercise recommended.  Please call us with any change in clinical status.  She is going to follow-up with pulmonary given her noncardiac CT findings.    No orders of the defined types were placed in this encounter.    No orders of the defined types were placed in this encounter.    Medications Discontinued During This Encounter   Medication Reason     valACYclovir (VALTREX) 1000 mg tablet Stopped by Patient         No  "diagnosis found.    CURRENT MEDICATIONS:  Current Outpatient Medications   Medication Sig Dispense Refill     albuterol (PROAIR HFA/PROVENTIL HFA/VENTOLIN HFA) 108 (90 Base) MCG/ACT inhaler Inhale 2 puffs into the lungs as needed 8.5 g 3     Cholecalciferol (VITAMIN D3) 1000 UNITS CAPS Take 1 capsule by mouth daily       fluocinonide (LIDEX) 0.05 % gel Apply topically 2 times daily       methylcellulose (CITRUCEL) 500 MG TABS tablet        omeprazole (PRILOSEC) 10 MG DR capsule Take 20 mg by mouth as needed         ALLERGIES     Allergies   Allergen Reactions     Atorvastatin Rash     Nickel Rash     Other reaction(s): *Unknown  Other reaction(s): *Unknown       PAST MEDICAL HISTORY:  Past Medical History:   Diagnosis Date     Age-related osteoporosis without current pathological fracture 2/22/2019     Benign positional vertigo decades     Bilateral hearing loss, unspecified hearing loss type 12/6/2018     CARDIOVASCULAR SCREENING; LDL GOAL LESS THAN 160 7/17/2013     Cerebral aneurysm 12/2017     Erythema multiforme 9/26/2016     GERD (gastroesophageal reflux disease)      Hearing problem '04 & \"18    SNHL mild L ear;moderate-severe R ear  >75% loss word compre     Hematuria 9/26/2016    Overview:  Has had urologic evaluation     Laryngospasm 9/26/2016     Lichen planus      Migraine     with auora     Migraines 2000    Ocular migraine/ Migraine with Aura     Ocular migraine 9/26/2016     Oral lichen planus 9/26/2016     Osteopenia 2010     Osteopenia of left hip 12/12/2018     Physical exam 9/26/2016    Overview:  Full code.  Would want her  and son to make medical decisions for her if she were unable to do so.  Does not have an advanced directive.     Overview:  Diet - tries to eat a healthy diet  Exercise - \"I'm a fair weather walker\"  Active during the day Mammogram - 10/2017  Colonoscopy - 2/2008 - next in 2018  DXA - 8/2015 - next in 2018  Immunizations -  Up to date      Right internal carotid artery " aneurysm 2018    5 mm     Sensorineural hearing loss 2 &  worsened     Tinnitus 2018    R ear only     White coat syndrome without diagnosis of hypertension 2018       PAST SURGICAL HISTORY:  Past Surgical History:   Procedure Laterality Date     DILATION AND CURETTAGE       LAPAROSCOPY       TONSILLECTOMY       TONSILLECTOMY & ADENOIDECTOMY         FAMILY HISTORY:  Family History   Problem Relation Age of Onset     Cerebrovascular Disease Father          CVA 2006     Other - See Comments Mother          58 scleroderma     Heart Failure Maternal Grandfather      Heart Disease Maternal Grandfather         CHF     Diabetes Maternal Grandmother         Type 1     Cancer Paternal Grandmother         Endometrial CA of uterus     Breast Cancer Paternal Aunt      Diabetes Cousin         Type 1     Glaucoma No family hx of      Macular Degeneration No family hx of        SOCIAL HISTORY:  Social History     Socioeconomic History     Marital status:      Spouse name: None     Number of children: None     Years of education: None     Highest education level: None   Occupational History     Occupation: RN     Comment: [employer]   Social Needs     Financial resource strain: None     Food insecurity     Worry: None     Inability: None     Transportation needs     Medical: None     Non-medical: None   Tobacco Use     Smoking status: Former Smoker     Packs/day: 0.50     Years: 12.00     Pack years: 6.00     Types: Cigarettes     Start date: 1968     Last attempt to quit: 1980     Years since quittin.7     Smokeless tobacco: Never Used   Substance and Sexual Activity     Alcohol use: Not Currently     Alcohol/week: 1.0 - 2.0 standard drinks     Comment: Minimal 1 glass wine approx. 2x/month     Drug use: No     Sexual activity: Yes     Partners: Male   Lifestyle     Physical activity     Days per week: None     Minutes per session: None     Stress: None    Relationships     Social connections     Talks on phone: None     Gets together: None     Attends [employer] service: None     Active member of club or organization: None     Attends meetings of clubs or organizations: None     Relationship status: None     Intimate partner violence     Fear of current or ex partner: None     Emotionally abused: None     Physically abused: None     Forced sexual activity: None   Other Topics Concern     Parent/sibling w/ CABG, MI or angioplasty before 65F 55M? 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 3 x's    How much calcium per day? diet       How much caffeine per day? 2-3 cups    How much vitamin D per day? 2000 iu    Do you/your family wear seatbelts?  Yes    Do you/your family use safety helmets? n/a    Do you/your family use sunscreen? Yes    Do you/your family keep firearms in the home? No    Do you/your family have a smoke detector(s)? Yes        Do you feel safe in your home? Yes    Has anyone ever touched you in an unwanted manner? No     Explain         August 4, 2015 Alina Grant LPN               Review of Systems:  Skin:        Eyes:       ENT:       Respiratory:       Cardiovascular:       Gastroenterology:      Genitourinary:       Musculoskeletal:       Neurologic:       Psychiatric:       Heme/Lymph/Imm:       Endocrine:         Physical Exam:  Vitals: There were no vitals taken for this visit.    Recent Lab Results:  LIPID RESULTS:  Lab Results   Component Value Date    CHOL 216 (H) 09/18/2020    HDL 87 09/18/2020     (H) 09/18/2020    TRIG 80 09/18/2020    CHOLHDLRATIO 2.6 08/10/2015       LIVER ENZYME RESULTS:  No results found for: AST, ALT    CBC RESULTS:  Lab Results   Component Value Date    WBC 5.4 08/28/2020    RBC 4.63 08/28/2020    HGB 13.7 08/28/2020    HCT 41.3 08/28/2020    MCV 89 08/28/2020     MCH 29.6 08/28/2020    MCHC 33.2 08/28/2020    RDW 12.5 08/28/2020     08/28/2020       BMP RESULTS:  Lab Results   Component Value Date     08/28/2020    POTASSIUM 4.0 08/28/2020    CHLORIDE 103 08/28/2020    CO2 27 08/28/2020    ANIONGAP 3 08/28/2020     (H) 08/28/2020    BUN 20 08/28/2020    CR 0.72 08/28/2020    GFRESTIMATED 85 08/28/2020    GFRESTBLACK >90 08/28/2020    MARCIO 9.2 08/28/2020        A1C RESULTS:  No results found for: A1C    INR RESULTS:  No results found for: INR      Thank you for allowing me to participate in the care of your patient.    Sincerely,     Asher Sotelo MD     [facility]

## 2020-09-25 NOTE — TELEPHONE ENCOUNTER
RECORDS RECEIVED FROM: internal    DATE RECEIVED: 10.16.20   NOTES STATUS DETAILS   OFFICE NOTE from referring provider Internal     OFFICE NOTE from other specialist Internal  12.27.19 zena   12.19.19 radford      DISCHARGE SUMMARY from hospital na    DISCHARGE REPORT from the ER Internal  8.28.20 yuliya morton    MEDICATION LIST Internal     IMAGING  (NEED IMAGES AND REPORTS)     CT SCAN Internal  9/18/20    CHEST XRAY (CXR) Internal  8/28/20, 2.10.20,    TESTS     PULMONARY FUNCTION TESTING (PFT) Scheduled  Scheduled 10.16.20

## 2020-09-26 ASSESSMENT — ENCOUNTER SYMPTOMS
HEMOPTYSIS: 0
DIARRHEA: 0
LEG PAIN: 0
SPUTUM PRODUCTION: 0
HEARTBURN: 1
COUGH: 0
POSTURAL DYSPNEA: 0
WHEEZING: 0
DYSPNEA ON EXERTION: 1
ORTHOPNEA: 0
BOWEL INCONTINENCE: 0
LIGHT-HEADEDNESS: 0
DIFFICULTY URINATING: 0
FLANK PAIN: 0
SNORES LOUDLY: 0
CONSTIPATION: 0
VOMITING: 0
SHORTNESS OF BREATH: 0
HYPERTENSION: 0
JAUNDICE: 0
BLOATING: 0
SYNCOPE: 0
SLEEP DISTURBANCES DUE TO BREATHING: 0
HYPOTENSION: 0
COUGH DISTURBING SLEEP: 0
BLOOD IN STOOL: 0
ABDOMINAL PAIN: 0
NAUSEA: 0
HEMATURIA: 0
DYSURIA: 0
RECTAL PAIN: 0

## 2020-10-06 ENCOUNTER — OFFICE VISIT (OUTPATIENT)
Dept: PULMONOLOGY | Facility: CLINIC | Age: 70
End: 2020-10-06
Attending: INTERNAL MEDICINE
Payer: COMMERCIAL

## 2020-10-06 VITALS
RESPIRATION RATE: 18 BRPM | HEART RATE: 73 BPM | BODY MASS INDEX: 19.89 KG/M2 | OXYGEN SATURATION: 94 % | DIASTOLIC BLOOD PRESSURE: 68 MMHG | WEIGHT: 127 LBS | SYSTOLIC BLOOD PRESSURE: 131 MMHG

## 2020-10-06 DIAGNOSIS — R93.89 ABNORMAL FINDING ON CT SCAN: Primary | ICD-10-CM

## 2020-10-06 PROCEDURE — G0463 HOSPITAL OUTPT CLINIC VISIT: HCPCS

## 2020-10-06 PROCEDURE — 99203 OFFICE O/P NEW LOW 30 MIN: CPT | Performed by: INTERNAL MEDICINE

## 2020-10-06 ASSESSMENT — PAIN SCALES - GENERAL: PAINLEVEL: NO PAIN (0)

## 2020-10-06 NOTE — LETTER
10/6/2020         RE: Sarah العلي  [address] [place] [address]        Dear Colleague,    Thank you for referring your patient, Sarah العلي, to the [facility] FOR LUNG SCIENCE AND [facility] [place]. Please see a copy of my visit note below.    [facility] Pulmonology Consult    Reason for Visit  Sarah العلي is a 70 year old female who is referred for abnormal findings on CT scan.    Pulmonary HPI  69 yo woman in excellent health, had atypical pneumonia for 5-6 weeks in the winter 12/2020. She is concerned after her coronary calcium CT demonstrated some LLL very subtle atelectasis/scarring. She denies any sob, denies any SUAZO, denies any cough. She walks most days of the week 30-45 minutes and has not noticed any respiratory difficulties with walking. She and her  often go to [place] in the winter, but won't be traveling this year to [place] [address].   No previous hospitalizations for pulmonary disease. Has some upper airway wheezing with respiratory viruses and uses albuterol prn to good effect. In 1993, she converted PPD from negative to positive but CXR at that time was negative no treatment indicated and she continues to have no symptoms. 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 She does have a hx of GERD and reflux which she manages with a PPI a few times a year, denies any dysphagia, has Raynaud's and a family history (mother) who passed away with Scleroderma. She denies any PND, orthopnea, LE swelling, chest pain/pressure/palpitations. Overall, she is quite healthy.      Previous therapies:ISAI  Current therapies: ISAI      The patient was seen and examined by Annette Barbosa MD  Current Outpatient Medications   Medication     albuterol (PROAIR HFA/PROVENTIL HFA/VENTOLIN HFA) 108 (90 Base) MCG/ACT inhaler     Cholecalciferol (VITAMIN D3) 1000 UNITS CAPS     fluocinonide (LIDEX) 0.05 % gel     methylcellulose  (CITRUCEL) 500 MG TABS tablet     omeprazole (PRILOSEC) 10 MG DR capsule     No current facility-administered medications for this visit.        Allergies   Allergen Reactions     Atorvastatin Rash     Nickel Rash     Other reaction(s): *Unknown  Other reaction(s): *Unknown       Social History     Socioeconomic History     Marital status:      Spouse name: Not on file     Number of children: Not on file     Years of education: Not on file     Highest education level: Not on file   Occupational History     Occupation: RN     Comment: [employer]   Social Needs     Financial resource strain: Not on file     Food insecurity     Worry: Not on file     Inability: Not on file     Transportation needs     Medical: Not on file     Non-medical: Not on file   Tobacco Use     Smoking status: Former Smoker     Packs/day: 0.50     Years: 12.00     Pack years: 6.00     Types: Cigarettes     Start date: 1968     Quit date: 1980     Years since quittin.7     Smokeless tobacco: Never Used   Substance and Sexual Activity     Alcohol use: Not Currently     Alcohol/week: 1.0 - 2.0 standard drinks     Comment: Minimal 1 glass wine approx. 2x/month     Drug use: No     Sexual activity: Yes     Partners: Male   Lifestyle     Physical activity     Days per week: Not on file     Minutes per session: Not on file     Stress: Not on file   Relationships     Social connections     Talks on phone: Not on file     Gets together: Not on file     Attends [employer] service: Not on file     Active member of club or organization: Not on file     Attends meetings of clubs or organizations: Not on file     Relationship status: Not on file     Intimate partner violence     Fear of current or ex partner: Not on file     Emotionally abused: Not on file     Physically abused: Not on file     Forced sexual activity: Not on file   Other Topics Concern     Parent/sibling w/ CABG, MI or angioplasty before 65F 55M? 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 3 x's    How much calcium per day? diet       How much caffeine per day? 2-3 cups    How much vitamin D per day? 2000 iu    Do you/your family wear seatbelts?  Yes    Do you/your family use safety helmets? n/a    Do you/your family use sunscreen? Yes    Do you/your family keep firearms in the home? No    Do you/your family have a smoke detector(s)? Yes        Do you feel safe in your home? Yes    Has anyone ever touched you in an unwanted manner? No     Explain         August 4, 2015 Alina Grant LPN             Tobacco: Quit over 40 years ago, 1/2-1 pack per day x12 years.   EtOH: Occasional wine  Occupations: Retired Pediatric RN   Exposures: No pets, birds, no hot tub exposures, no significant sandblasting or silica exposure or known asbestos exposure.    Past Medical History:   Diagnosis Date     Age-related osteoporosis without current pathological fracture 2/22/2019     Benign positional vertigo decades     Bilateral hearing loss, unspecified hearing loss type 12/6/2018     CARDIOVASCULAR SCREENING; LDL GOAL LESS THAN 160 7/17/2013     Cerebral aneurysm 12/2017     Erythema multiforme 9/26/2016     GERD (gastroesophageal reflux disease)      Hearing problem '04 & \"18    SNHL mild L ear;moderate-severe R ear  >75% loss word compre     Hematuria 9/26/2016    Overview:  Has had urologic evaluation     Laryngospasm 9/26/2016     Lichen planus      Migraine     with auora     Migraines 2000    Ocular migraine/ Migraine with Aura     Ocular migraine 9/26/2016     Oral lichen planus 9/26/2016     Osteopenia 2010     Osteopenia of left hip 12/12/2018     Physical exam 9/26/2016    Overview:  Full code.  Would want her  and son to make medical decisions for her if she were unable to do so.  Does not have an advanced directive.     " "Overview:  Diet - tries to eat a healthy diet  Exercise - \"I'm a fair weather walker\"  Active during the day Mammogram - 10/2017  Colonoscopy - 2008 - next in   DXA - 2015 - next in   Immunizations -  Up to date      Right internal carotid artery aneurysm 2018    5 mm     Sensorineural hearing loss  &  worsened     Tinnitus 2018    R ear only     White coat syndrome without diagnosis of hypertension 2018       Past Surgical History:   Procedure Laterality Date     DILATION AND CURETTAGE       LAPAROSCOPY       TONSILLECTOMY       TONSILLECTOMY & ADENOIDECTOMY         Family History   Problem Relation Age of Onset     Cerebrovascular Disease Father          CVA      Other - See Comments Mother          58 scleroderma     Heart Failure Maternal Grandfather      Heart Disease Maternal Grandfather         CHF     Diabetes Maternal Grandmother         Type 1     Cancer Paternal Grandmother         Endometrial CA of uterus     Breast Cancer Paternal Aunt      Diabetes Cousin         Type 1     Glaucoma No family hx of      Macular Degeneration No family hx of          ROS   Eyes: negative  Ears/Nose/Throat: negative  Respiratory: No shortness of breath, dyspnea on exertion, cough, or hemoptysis Dyspnea: No, Cough: No, Chest pain: No, Wheezing: No, Sputum Production: No, Hemoptysis: No  Skin: negative for, rash, scaling  Cardiovascular: negative for, palpitations, tachycardia, chest pain, exertional chest pain or pressure, paroxysmal nocturnal dyspnea, dyspnea on exertion, orthopnea, lower extremity edema and syncope or near-syncope  Gastrointestinal: positive for reflux, negative for, poor appetite, nausea, vomiting, hematemesis, abdominal pain, constipation and diarrhea  Genitourinary: negative  Musculoskeletal: negative  Neurologic: negative  Psychiatric: negative  Hematologic/Lymphatic/Immunologic: negative  Endocrine: negative    A complete ROS was otherwise " negative except as noted in the HPI.    /68   Pulse 73   Resp 18   Wt 57.6 kg (127 lb)   SpO2 94%   BMI 19.89 kg/m    Exam:   GENERAL APPEARANCE: Well developed, well nourished, alert, and in no apparent distress.  EYES: PERRL, EOMI  HENT: Nasal mucosa with no edema and no hyperemia. No nasal polyps.  EARS: Canals clear, TMs normal, hearing aids removed for exam  MOUTH: Oral mucosa is moist, without any lesions, no tonsillar enlargement, no oropharyngeal exudate.  NECK: supple, no masses, no thyromegaly.  LYMPHATICS: No significant axillary, cervical, or supraclavicular nodes.  RESP: normal percussion, good air flow throughout.  No crackles. No rhonchi. No wheezes.  CV: Normal S1, S2, regular rhythm, normal rate. No murmur.  No rub. No gallop. No LE edema.   ABDOMEN:  Bowel sounds normal, soft, nontender, no HSM or masses.   MS: extremities normal. No clubbing. No cyanosis.   SKIN: no rash on limited exam, no significant side of hypertrophic skin or shiny skin   NEURO: Mentation intact, speech normal, normal strength and tone, normal gait and stance  PSYCH: mentation appears normal. and affect normal/bright    Results  PFTs  No PFTs    Imaging- All imaging was reviewed by me personally  CXR:   8/28/20: My read: No pleural effusions, sharp costophrenic angles, no consolidations or infiltrates    CT:  9/18/20:   CT was not intended to evaluate for pulmonary disease this is a coronary artery calcium CT so it does not capture the apices of the lungs. There is some very subtle subpleural reticulation in the LLL posteriorly but this may, as the report mentions, simply be a little linear atelectasis or scarring.     Echo:   Exercise Stress Echo 9/3/20   Exercise stress echocardiogram with no inducible ischemia in the setting of  reduced exercise tolerance.     Target heart rate achieved. Normal blood pressure response to exercise.  Test stopped due to fatigue.  No angina symptoms with exercise.  No ECG evidence  of ischemia.  Normal segmental and global LV function with EF of approximately 55-60% at  rest; with exercise left ventricular cavity size and LVEF did not change  significantly.  No stress induced regional wall motion abnormalities.  Reduced functional capacity for age.    Lab Results:    None for today    Other Testing/Results  + PPD in the past 1993 she converted but has no evidence of primary disease on imaging      Assessment and Plan:   Magdiel العلي is a 69 yo woman with a very benign PMH consisting of GERD who presents after undergoing a CT coronary calcium evaluation and was incidentally found to have some slight atelectasis or linear scarring along the left lung base. She is otherwise completely asymptomatic from a pulmonary standpoint.     1. Atelectasis vs. Scarring in LLL: This was found incidentally on CT scan for her coronary calcium. We discussed that in this setting that the imaging was not ideal and that having reviewed the scan I do not think this needs to be serially followed nor does its appearance provoke concern. She is completely asymptomatic, so I would say that if she were to develop any increasing SUAZO I would not hesitate to get a dedicated high resolution CT chest non contrast to evaluate for progression of subpleural reticulation but at this point I do not think it is necessarily required  - Follow up with PCP, can re refer if develops symptoms and would obtain a noncon high rest CT chest at that time along with PFTs with plethysmography    2. GERD: She has no complaints or evidence of bronchiectasis or recurrent pna or even asthma, but we did discuss that if she is having reflux symptoms behavioral modification like elevation of the head of the bed, and not reclining within 2 hours after eating.     3. +PPD: She notes that she had a positive PPD that converted in 1993, CXR was clear at that time and continues to be clear. 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       Appreciate this consult, at this time there is no need for further follow up but if any questions should arise or any other concerns please feel free to refer back.     Annette Barbosa MD    [facility]  [facility]   [address] [place] [address]  Mail Code: [address] [place] [address]  Fax: [phone]  Ph: [phone]

## 2020-10-06 NOTE — PROGRESS NOTES
[facility] Pulmonology Consult    Reason for Visit  Sarah العلي is a 70 year old female who is referred for abnormal findings on CT scan.    Pulmonary HPI  69 yo woman in excellent health, had atypical pneumonia for 5-6 weeks in the winter 12/2020. She is concerned after her coronary calcium CT demonstrated some LLL very subtle atelectasis/scarring. She denies any sob, denies any SUAZO, denies any cough. She walks most days of the week 30-45 minutes and has not noticed any respiratory difficulties with walking. She and her  often go to [place] in the winter, but won't be traveling this year to [place] [address].   No previous hospitalizations for pulmonary disease. Has some upper airway wheezing with respiratory viruses and uses albuterol prn to good effect. In 1993, she converted PPD from negative to positive but CXR at that time was negative no treatment indicated and she continues to have no symptoms. 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 She does have a hx of GERD and reflux which she manages with a PPI a few times a year, denies any dysphagia, has Raynaud's and a family history (mother) who passed away with Scleroderma. She denies any PND, orthopnea, LE swelling, chest pain/pressure/palpitations. Overall, she is quite healthy.      Previous therapies:ISAI  Current therapies: ISAI      The patient was seen and examined by Annette Barbosa MD  Current Outpatient Medications   Medication     albuterol (PROAIR HFA/PROVENTIL HFA/VENTOLIN HFA) 108 (90 Base) MCG/ACT inhaler     Cholecalciferol (VITAMIN D3) 1000 UNITS CAPS     fluocinonide (LIDEX) 0.05 % gel     methylcellulose (CITRUCEL) 500 MG TABS tablet     omeprazole (PRILOSEC) 10 MG DR capsule     No current facility-administered medications for this visit.        Allergies   Allergen Reactions     Atorvastatin Rash     Nickel Rash     Other reaction(s): *Unknown  Other reaction(s): *Unknown       Social History      Socioeconomic History     Marital status:      Spouse name: Not on file     Number of children: Not on file     Years of education: Not on file     Highest education level: Not on file   Occupational History     Occupation: RN     Comment: [employer]   Social Needs     Financial resource strain: Not on file     Food insecurity     Worry: Not on file     Inability: Not on file     Transportation needs     Medical: Not on file     Non-medical: Not on file   Tobacco Use     Smoking status: Former Smoker     Packs/day: 0.50     Years: 12.00     Pack years: 6.00     Types: Cigarettes     Start date: 1968     Quit date: 1980     Years since quittin.7     Smokeless tobacco: Never Used   Substance and Sexual Activity     Alcohol use: Not Currently     Alcohol/week: 1.0 - 2.0 standard drinks     Comment: Minimal 1 glass wine approx. 2x/month     Drug use: No     Sexual activity: Yes     Partners: Male   Lifestyle     Physical activity     Days per week: Not on file     Minutes per session: Not on file     Stress: Not on file   Relationships     Social connections     Talks on phone: Not on file     Gets together: Not on file     Attends [employer] service: Not on file     Active member of club or organization: Not on file     Attends meetings of clubs or organizations: Not on file     Relationship status: Not on file     Intimate partner violence     Fear of current or ex partner: Not on file     Emotionally abused: Not on file     Physically abused: Not on file     Forced sexual activity: Not on file   Other Topics Concern     Parent/sibling w/ CABG, MI or angioplasty before 65F 55M? 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 3 x's    How much calcium per day? diet       How much caffeine per day? 2-3 cups    How much vitamin D per day? 2000 iu  "   Do you/your family wear seatbelts?  Yes    Do you/your family use safety helmets? n/a    Do you/your family use sunscreen? Yes    Do you/your family keep firearms in the home? No    Do you/your family have a smoke detector(s)? Yes        Do you feel safe in your home? Yes    Has anyone ever touched you in an unwanted manner? No     Explain         August 4, 2015 Alina Tobiasan JEFERSON             Tobacco: Quit over 40 years ago, 1/2-1 pack per day x12 years.   EtOH: Occasional wine  Occupations: Retired Pediatric RN   Exposures: No pets, birds, no hot tub exposures, no significant sandblasting or silica exposure or known asbestos exposure.    Past Medical History:   Diagnosis Date     Age-related osteoporosis without current pathological fracture 2/22/2019     Benign positional vertigo decades     Bilateral hearing loss, unspecified hearing loss type 12/6/2018     CARDIOVASCULAR SCREENING; LDL GOAL LESS THAN 160 7/17/2013     Cerebral aneurysm 12/2017     Erythema multiforme 9/26/2016     GERD (gastroesophageal reflux disease)      Hearing problem '04 & \"18    SNHL mild L ear;moderate-severe R ear  >75% loss word compre     Hematuria 9/26/2016    Overview:  Has had urologic evaluation     Laryngospasm 9/26/2016     Lichen planus      Migraine     with auora     Migraines 2000    Ocular migraine/ Migraine with Aura     Ocular migraine 9/26/2016     Oral lichen planus 9/26/2016     Osteopenia 2010     Osteopenia of left hip 12/12/2018     Physical exam 9/26/2016    Overview:  Full code.  Would want her  and son to make medical decisions for her if she were unable to do so.  Does not have an advanced directive.     Overview:  Diet - tries to eat a healthy diet  Exercise - \"I'm a fair weather walker\"  Active during the day Mammogram - 10/2017  Colonoscopy - 2/2008 - next in 2018  DXA - 8/2015 - next in 2018  Immunizations -  Up to date      Right internal carotid artery aneurysm 12/6/2018    5 mm     Sensorineural " hearing loss  &  worsened     Tinnitus 2018    R ear only     White coat syndrome without diagnosis of hypertension 2018       Past Surgical History:   Procedure Laterality Date     DILATION AND CURETTAGE       LAPAROSCOPY       TONSILLECTOMY       TONSILLECTOMY & ADENOIDECTOMY         Family History   Problem Relation Age of Onset     Cerebrovascular Disease Father          CVA      Other - See Comments Mother          58 scleroderma     Heart Failure Maternal Grandfather      Heart Disease Maternal Grandfather         CHF     Diabetes Maternal Grandmother         Type 1     Cancer Paternal Grandmother         Endometrial CA of uterus     Breast Cancer Paternal Aunt      Diabetes Cousin         Type 1     Glaucoma No family hx of      Macular Degeneration No family hx of          ROS   Eyes: negative  Ears/Nose/Throat: negative  Respiratory: No shortness of breath, dyspnea on exertion, cough, or hemoptysis Dyspnea: No, Cough: No, Chest pain: No, Wheezing: No, Sputum Production: No, Hemoptysis: No  Skin: negative for, rash, scaling  Cardiovascular: negative for, palpitations, tachycardia, chest pain, exertional chest pain or pressure, paroxysmal nocturnal dyspnea, dyspnea on exertion, orthopnea, lower extremity edema and syncope or near-syncope  Gastrointestinal: positive for reflux, negative for, poor appetite, nausea, vomiting, hematemesis, abdominal pain, constipation and diarrhea  Genitourinary: negative  Musculoskeletal: negative  Neurologic: negative  Psychiatric: negative  Hematologic/Lymphatic/Immunologic: negative  Endocrine: negative    A complete ROS was otherwise negative except as noted in the HPI.    /68   Pulse 73   Resp 18   Wt 57.6 kg (127 lb)   SpO2 94%   BMI 19.89 kg/m    Exam:   GENERAL APPEARANCE: Well developed, well nourished, alert, and in no apparent distress.  EYES: PERRL, EOMI  HENT: Nasal mucosa with no edema and no hyperemia. No  nasal polyps.  EARS: Canals clear, TMs normal, hearing aids removed for exam  MOUTH: Oral mucosa is moist, without any lesions, no tonsillar enlargement, no oropharyngeal exudate.  NECK: supple, no masses, no thyromegaly.  LYMPHATICS: No significant axillary, cervical, or supraclavicular nodes.  RESP: normal percussion, good air flow throughout.  No crackles. No rhonchi. No wheezes.  CV: Normal S1, S2, regular rhythm, normal rate. No murmur.  No rub. No gallop. No LE edema.   ABDOMEN:  Bowel sounds normal, soft, nontender, no HSM or masses.   MS: extremities normal. No clubbing. No cyanosis.   SKIN: no rash on limited exam, no significant side of hypertrophic skin or shiny skin   NEURO: Mentation intact, speech normal, normal strength and tone, normal gait and stance  PSYCH: mentation appears normal. and affect normal/bright    Results  PFTs  No PFTs    Imaging- All imaging was reviewed by me personally  CXR:   8/28/20: My read: No pleural effusions, sharp costophrenic angles, no consolidations or infiltrates    CT:  9/18/20:   CT was not intended to evaluate for pulmonary disease this is a coronary artery calcium CT so it does not capture the apices of the lungs. There is some very subtle subpleural reticulation in the LLL posteriorly but this may, as the report mentions, simply be a little linear atelectasis or scarring.     Echo:   Exercise Stress Echo 9/3/20   Exercise stress echocardiogram with no inducible ischemia in the setting of  reduced exercise tolerance.     Target heart rate achieved. Normal blood pressure response to exercise.  Test stopped due to fatigue.  No angina symptoms with exercise.  No ECG evidence of ischemia.  Normal segmental and global LV function with EF of approximately 55-60% at  rest; with exercise left ventricular cavity size and LVEF did not change  significantly.  No stress induced regional wall motion abnormalities.  Reduced functional capacity for age.    Lab Results:    None for  today    Other Testing/Results  + PPD in the past 1993 she converted but has no evidence of primary disease on imaging      Assessment and Plan:   Magdiel العلي is a 71 yo woman with a very benign PMH consisting of GERD who presents after undergoing a CT coronary calcium evaluation and was incidentally found to have some slight atelectasis or linear scarring along the left lung base. She is otherwise completely asymptomatic from a pulmonary standpoint.     1. Atelectasis vs. Scarring in LLL: This was found incidentally on CT scan for her coronary calcium. We discussed that in this setting that the imaging was not ideal and that having reviewed the scan I do not think this needs to be serially followed nor does its appearance provoke concern. She is completely asymptomatic, so I would say that if she were to develop any increasing SUAZO I would not hesitate to get a dedicated high resolution CT chest non contrast to evaluate for progression of subpleural reticulation but at this point I do not think it is necessarily required  - Follow up with PCP, can re refer if develops symptoms and would obtain a noncon high rest CT chest at that time along with PFTs with plethysmography    2. GERD: She has no complaints or evidence of bronchiectasis or recurrent pna or even asthma, but we did discuss that if she is having reflux symptoms behavioral modification like elevation of the head of the bed, and not reclining within 2 hours after eating.     3. +PPD: She notes that she had a positive PPD that converted in 1993, CXR was clear at that time and continues to be clear. Given longevity would not pursue treatment at this time with clear imaging, she converted while working in the hospital.       Appreciate this consult, at this time there is no need for further follow up but if any questions should arise or any other concerns please feel free to refer back.     Annette Barbosa MD    [facility]  [facility]   [address] [place] [address]  Mail Code: [address] [place] [address]  Fax: [phone]  Ph: [phone]

## 2020-10-06 NOTE — NURSING NOTE
Chief Complaint   Patient presents with     Consult     Abnormal CT      Medications reviewed and updated.  Vitals taken  Ilda Herndon CMA

## 2020-10-16 ENCOUNTER — PRE VISIT (OUTPATIENT)
Dept: PULMONOLOGY | Facility: CLINIC | Age: 70
End: 2020-10-16

## 2021-01-06 ENCOUNTER — TELEPHONE (OUTPATIENT)
Dept: ENDOCRINOLOGY | Facility: CLINIC | Age: 71
End: 2021-01-06

## 2021-01-06 NOTE — TELEPHONE ENCOUNTER
[facility] Call Center    Phone Message    May a detailed message be left on voicemail: yes     Reason for Call: Other: Pt requesting call back. Pt called and scheduled a f/u appt with Dr. Nichols for 4/9/21. Pt stated she needs to have her Reclast ordered and is not sure how to have that done     Action Taken: Message routed to:  [facility] ([facility] endo

## 2021-01-07 RX ORDER — HEPARIN SODIUM (PORCINE) LOCK FLUSH IV SOLN 100 UNIT/ML 100 UNIT/ML
5 SOLUTION INTRAVENOUS
Status: CANCELLED | OUTPATIENT
Start: 2021-01-07

## 2021-01-07 RX ORDER — HEPARIN SODIUM,PORCINE 10 UNIT/ML
5 VIAL (ML) INTRAVENOUS
Status: CANCELLED | OUTPATIENT
Start: 2021-01-07

## 2021-01-07 RX ORDER — ZOLEDRONIC ACID 5 MG/100ML
5 INJECTION, SOLUTION INTRAVENOUS ONCE
Status: CANCELLED
Start: 2021-01-07 | End: 2021-01-07

## 2021-01-07 NOTE — TELEPHONE ENCOUNTER
Last seen 2/2019 declined due to covid. Now scheduled for 4/21 but needing infusion. Do you want to wait until seen to order this again ?Olivia Walsh RN on 1/7/2021 at 8:49 AM

## 2021-01-07 NOTE — TELEPHONE ENCOUNTER
My chart message left with number to schedule Reclast for April per Dr Nichols . Olivia Walsh, RN on 1/7/2021 at 11:04 AM

## 2021-01-12 ENCOUNTER — TELEPHONE (OUTPATIENT)
Dept: FAMILY MEDICINE | Facility: CLINIC | Age: 71
End: 2021-01-12

## 2021-01-12 DIAGNOSIS — M85.9 LOW BONE DENSITY: Primary | ICD-10-CM

## 2021-01-12 NOTE — TELEPHONE ENCOUNTER
Order placed by Dr. Unger, spoke with patient and informed her. She verbalized understanding and had no further questions.    Britany Pelletier RN

## 2021-01-12 NOTE — TELEPHONE ENCOUNTER
[facility] phone call message - order or referral request from patient:     Order or referral being requested: Requested order: Dexa bone scan    Additional Details: Patient calling to request order for Dexa bone scan. Please call patient to advise when order has been placed.    OK to leave a message on voice mail? Yes    Primary language: English      needed? No    Call taken on January 12, 2021 at 9:17 AM by Camilla Gray    Order request route to [facility] TRIAGE   Referrals Route to [facility] ([place]/Purple) CARE COORDINATOR

## 2021-01-15 ENCOUNTER — HEALTH MAINTENANCE LETTER (OUTPATIENT)
Age: 71
End: 2021-01-15

## 2021-03-08 DIAGNOSIS — T88.1XXD: Primary | ICD-10-CM

## 2021-03-20 ASSESSMENT — ENCOUNTER SYMPTOMS
SINUS PAIN: 0
BLOOD IN STOOL: 0
VOMITING: 0
CONSTIPATION: 0
NAUSEA: 0
TROUBLE SWALLOWING: 0
DIARRHEA: 0
SINUS CONGESTION: 1
NECK MASS: 0
BOWEL INCONTINENCE: 0
SMELL DISTURBANCE: 0
JAUNDICE: 0
FLANK PAIN: 0
SLEEP DISTURBANCES DUE TO BREATHING: 0
EXERCISE INTOLERANCE: 0
ABDOMINAL PAIN: 0
LIGHT-HEADEDNESS: 0
LEG PAIN: 0
HYPERTENSION: 0
DIFFICULTY URINATING: 0
HOARSE VOICE: 0
HEMATURIA: 0
HYPOTENSION: 0
RECTAL PAIN: 0
ORTHOPNEA: 0
PALPITATIONS: 0
SORE THROAT: 0
SYNCOPE: 0
BLOATING: 0
HEARTBURN: 1
DYSURIA: 0
TASTE DISTURBANCE: 0

## 2021-03-20 ASSESSMENT — ACTIVITIES OF DAILY LIVING (ADL)
IN_THE_PAST_7_DAYS,_DID_YOU_NEED_HELP_FROM_OTHERS_TO_TAKE_CARE_OF_THINGS_SUCH_AS_LAUNDRY_AND_HOUSEKEEPING,_BANKING,_SHOPPING,_USING_THE_TELEPHONE,_FOOD_PREPARATION,_TRANSPORTATION,_OR_TAKING_YOUR_OWN_MEDICATIONS?: N
IN_THE_PAST_7_DAYS,_DID_YOU_NEED_HELP_FROM_OTHERS_TO_PERFORM_EVERYDAY_ACTIVITIES_SUCH_AS_EATING,_GETTING_DRESSED,_GROOMING,_BATHING,_WALKING,_OR_USING_THE_TOILET: N

## 2021-03-24 ENCOUNTER — OFFICE VISIT (OUTPATIENT)
Dept: FAMILY MEDICINE | Facility: CLINIC | Age: 71
End: 2021-03-24
Payer: COMMERCIAL

## 2021-03-24 VITALS
BODY MASS INDEX: 20.18 KG/M2 | TEMPERATURE: 98.2 F | DIASTOLIC BLOOD PRESSURE: 77 MMHG | SYSTOLIC BLOOD PRESSURE: 118 MMHG | RESPIRATION RATE: 16 BRPM | HEART RATE: 72 BPM | HEIGHT: 67 IN | OXYGEN SATURATION: 99 % | WEIGHT: 128.6 LBS

## 2021-03-24 DIAGNOSIS — R33.9 URINARY RETENTION WITH INCOMPLETE BLADDER EMPTYING: ICD-10-CM

## 2021-03-24 DIAGNOSIS — R39.198 DIFFICULTY URINATING: Primary | ICD-10-CM

## 2021-03-24 LAB
BACTERIA: NORMAL
BILIRUBIN UR: NEGATIVE MG/DL
BLOOD UR: ABNORMAL MG/DL
CASTS: NORMAL /LPF
CRYSTAL URINE: NORMAL /LPF
EPITHELIAL CELLS UR: NORMAL /LPF (ref 0–2)
GLUCOSE URINE: NEGATIVE
KETONES UR QL: NEGATIVE MG/DL
LEUKOCYTE ESTERASE UR: NEGATIVE
MUCOUS URINE: NORMAL LPF
NITRITE UR QL STRIP: NEGATIVE MG/DL
PH UR STRIP: 6 [PH] (ref 4.5–8)
PROTEIN UR: NEGATIVE MG/DL
RBC URINE: NORMAL /HPF
SP GR UR STRIP: 1.01 (ref 1–1.03)
UROBILINOGEN UR STRIP-ACNC: ABNORMAL E.U./DL
WBC URINE: <2 /HPF

## 2021-03-24 PROCEDURE — 87086 URINE CULTURE/COLONY COUNT: CPT | Performed by: FAMILY MEDICINE

## 2021-03-24 PROCEDURE — 81001 URINALYSIS AUTO W/SCOPE: CPT | Performed by: FAMILY MEDICINE

## 2021-03-24 PROCEDURE — 99213 OFFICE O/P EST LOW 20 MIN: CPT | Performed by: FAMILY MEDICINE

## 2021-03-24 ASSESSMENT — MIFFLIN-ST. JEOR: SCORE: 1134.83

## 2021-03-25 LAB
BACTERIA SPEC CULT: NORMAL
Lab: NORMAL
SPECIMEN SOURCE: NORMAL

## 2021-03-31 NOTE — PROGRESS NOTES
"    Assessment & Plan     Difficulty urinating  UA w/ micro hematuria, no evidence of infection  Will send for culture  Small volume urinary retention, ~100cc on bladder scan  Possible related to bladder prolapse, discussed possible urodynamic testing, pessary device or surgical intervention  - Urinalysis, Micro If (UA) ([facility])  - Urine Culture Aerobic Bacterial  - Urine Microscopic (UA) ([facility])    Urinary retention with incomplete bladder emptying  - UROLOGY ADULT REFERRAL - INTERNAL  - Urine Microscopic (UA) ([facility])    No follow-ups on file.    Laura Vaughan DO  [facility] ENOCH Veloz is a 70 year old who presents for the following health issues:    HPI     Since this morning, pt complains of difficulty urinating. Tried to go to the bathroom once and was unable to void. Felt suprapubic fullness. Urinated on arrival to clinic for urine sample. Prior to that was able to void last night. Never had urinary retention before. Hx of vaginal birth, hx of rectal prolapse. No burning with urination, hematuria, incontinence, saddle anesthesia, back pain.    Review of Systems   Constitutional, HEENT, cardiovascular, pulmonary, gi and gu systems are negative, except as otherwise noted.      Objective    /77   Pulse 72   Temp 98.2  F (36.8  C) (Oral)   Resp 16   Ht 1.7 m (5' 6.93\")   Wt 58.3 kg (128 lb 9.6 oz)   SpO2 99%   BMI 20.18 kg/m    Body mass index is 20.18 kg/m .  Physical Exam   GENERAL: healthy, alert and no distress  RESP: lungs clear to auscultation - no rales, rhonchi or wheezes  CV: regular rate and rhythm, normal S1 S2, no S3 or S4, no murmur, click or rub, no peripheral edema and peripheral pulses strong  ABDOMEN: soft, nontender, no hepatosplenomegaly, no masses and bowel sounds normal  MS: no gross musculoskeletal defects noted, no edema            "

## 2021-04-02 ENCOUNTER — TELEPHONE (OUTPATIENT)
Dept: UROLOGY | Facility: CLINIC | Age: 71
End: 2021-04-02

## 2021-04-02 ENCOUNTER — OFFICE VISIT (OUTPATIENT)
Dept: INTERNAL MEDICINE | Facility: CLINIC | Age: 71
End: 2021-04-02
Payer: COMMERCIAL

## 2021-04-02 VITALS
WEIGHT: 127 LBS | HEART RATE: 72 BPM | DIASTOLIC BLOOD PRESSURE: 67 MMHG | OXYGEN SATURATION: 100 % | BODY MASS INDEX: 19.93 KG/M2 | SYSTOLIC BLOOD PRESSURE: 147 MMHG

## 2021-04-02 DIAGNOSIS — I27.20 PULMONARY HTN (H): ICD-10-CM

## 2021-04-02 DIAGNOSIS — Z23 ENCOUNTER FOR IMMUNIZATION: Primary | ICD-10-CM

## 2021-04-02 DIAGNOSIS — M72.2 PLANTAR FASCIITIS, RIGHT: ICD-10-CM

## 2021-04-02 DIAGNOSIS — Z11.59 ENCOUNTER FOR SCREENING FOR OTHER VIRAL DISEASES: ICD-10-CM

## 2021-04-02 DIAGNOSIS — L43.9 LICHEN PLANUS: ICD-10-CM

## 2021-04-02 DIAGNOSIS — R33.9 URINARY RETENTION: ICD-10-CM

## 2021-04-02 LAB
LABORATORY COMMENT REPORT: NORMAL
SARS-COV-2 RNA RESP QL NAA+PROBE: NEGATIVE
SARS-COV-2 RNA RESP QL NAA+PROBE: NORMAL
SPECIMEN SOURCE: NORMAL
SPECIMEN SOURCE: NORMAL

## 2021-04-02 PROCEDURE — 90471 IMMUNIZATION ADMIN: CPT | Performed by: INTERNAL MEDICINE

## 2021-04-02 PROCEDURE — 99387 INIT PM E/M NEW PAT 65+ YRS: CPT | Mod: 25 | Performed by: INTERNAL MEDICINE

## 2021-04-02 PROCEDURE — U0003 INFECTIOUS AGENT DETECTION BY NUCLEIC ACID (DNA OR RNA); SEVERE ACUTE RESPIRATORY SYNDROME CORONAVIRUS 2 (SARS-COV-2) (CORONAVIRUS DISEASE [COVID-19]), AMPLIFIED PROBE TECHNIQUE, MAKING USE OF HIGH THROUGHPUT TECHNOLOGIES AS DESCRIBED BY CMS-2020-01-R: HCPCS | Mod: 90 | Performed by: PATHOLOGY

## 2021-04-02 PROCEDURE — 90715 TDAP VACCINE 7 YRS/> IM: CPT | Performed by: INTERNAL MEDICINE

## 2021-04-02 PROCEDURE — U0005 INFEC AGEN DETEC AMPLI PROBE: HCPCS | Mod: 90 | Performed by: PATHOLOGY

## 2021-04-02 RX ORDER — FLUOCINONIDE GEL 0.5 MG/G
GEL TOPICAL 2 TIMES DAILY
Qty: 15 G | Refills: 3 | Status: SHIPPED | OUTPATIENT
Start: 2021-04-02 | End: 2023-11-14

## 2021-04-02 NOTE — TELEPHONE ENCOUNTER
[facility] Call Center    Phone Message    May a detailed message be left on voicemail: yes     Reason for Call: Appointment Intake    Referring Provider Name: Dr. Cachorro Hernandez  Diagnosis and/or Symptoms: Urinary retention     Per guidelines this needs review. Please follow up with pt    Action Taken: Message routed to:  [facility] Urology    Travel Screening: Not Applicable

## 2021-04-02 NOTE — TELEPHONE ENCOUNTER
Called patient and left message to call back  I was looking at dr catrachito lynn on Thursday April 22 and hermilo nelson on Friday April 23 for new patient appointment Gricelda Mcneil LPN Staff Nurse

## 2021-04-02 NOTE — PROGRESS NOTES
"Medicare Annual Wellness Visit Previsit note    This is a very pleasant 70 year old year old female presents for a  Medicare Wellness Exam, Establish Care.    She has a past medical history of Age-related osteoporosis without current pathological fracture (2/22/2019), Benign positional vertigo (decades), Bilateral hearing loss, unspecified hearing loss type (12/6/2018), CARDIOVASCULAR SCREENING; LDL GOAL LESS THAN 160 (7/17/2013), Cerebral aneurysm (12/2017), Erythema multiforme (9/26/2016), GERD (gastroesophageal reflux disease), Hearing problem ('04 & \"18), Hematuria (9/26/2016), Laryngospasm (9/26/2016), Lichen planus, Migraine, Migraines (2000), Ocular migraine (9/26/2016), Oral lichen planus (9/26/2016), Osteopenia (2010), Osteopenia of left hip (12/12/2018), Physical exam (9/26/2016), Right internal carotid artery aneurysm (12/6/2018), Sensorineural hearing loss (2/04 & 4/18), Tinnitus (12/2018), and White coat syndrome without diagnosis of hypertension (12/6/2018).     10 + ROS is notable for Raynaud's phenomenon, urinary retention, GERD, plantar fasciitis.  She was also seen in the ER in the recent past for chest pain, and a fairly thorough workup was done including cardiac stress testing which was negative for ischemia but showed some mildly elevated PA pressures.  She has no history of significant smoking, sleep apnea, or lung disease.  A CT calcium score was noted to be zero.     Current Outpatient Medications:      albuterol (PROAIR HFA/PROVENTIL HFA/VENTOLIN HFA) 108 (90 Base) MCG/ACT inhaler, Inhale 2 puffs into the lungs as needed, Disp: 8.5 g, Rfl: 3     Cholecalciferol (VITAMIN D3) 1000 UNITS CAPS, Take 1 capsule by mouth daily, Disp: , Rfl:      fluocinonide (LIDEX) 0.05 % external gel, Apply topically 2 times daily, Disp: 15 g, Rfl: 3     methylcellulose (CITRUCEL) 500 MG TABS tablet, , Disp: , Rfl:      omeprazole (PRILOSEC) 10 MG DR capsule, Take 20 mg by mouth as needed, Disp: , Rfl:   No " current facility-administered medications for this visit.     Facility-Administered Medications Ordered in Other Visits:      zoledronic Acid (RECLAST) infusion 5 mg, 5 mg, Intravenous, Once, Magdalena Davis MD         Medical Care     Have you been to an ER or a hospital in the last year? Yes on 8/31/20  What other specialists or organizations are involved in your medical care?  Yes, [facility], [facility], [facility] in [place]    Current providers sharing in care for this patient include:  Patient Care Team       Relationship Specialty Notifications Start End    Alyson Unger MD PCP - General Family Practice  12/6/18     Phone: [phone] Fax: [phone]         [address] [address]    Joselito Rebolledo MD MD Family Medicine - Sports Medicine  4/16/15     Phone: [phone] Fax: [phone]         [address] [facility] [address]    Magdalena Davis MD MD Internal Medicine  1/8/19     Phone: [phone] Fax: [phone]         [address] [facility] [address] [facility] [address]    Chetna Arciniega PA-C Physician Assistant Physician Assistant  5/15/19     Phone: [phone] Fax: [phone]         [address] [facility] [address] [facility] [address]    Jorden Almaraz Chi, OD MD Optometry  11/1/19     Phone: [phone] Fax: [phone]         [address] [facility] [address]    Desi Calix MD MD Otolaryngology  1/8/20     Phone: [phone] Fax: [phone]         [address] [facility] [address] [facility] [address]    Alyson Unger MD Assigned PCP   6/21/20     Phone: [phone] Fax: [phone]         [address] [address]    Annette Barbosa MD Assigned Pulmonology Provider   10/23/20     Phone: [phone] Fax: [phone]         [address] [place] [address]    Asher Sotelo MD Assigned Heart and Vascular Provider   10/23/20     Phone: [phone] Pager: [phone] Fax:  [phone]        [address] [facility] [address]    Desi Calix MD Assigned Surgical Provider   10/23/20     Phone: [phone] Fax: [phone]         [address] [facility] [address] [facility] [address]                 Social History     Marital Status:  Who lives in your household?  and adult daughter  Does your home have any of the following safety concerns? Loose rugs in the hallway, no grab bars in the bathroom, no handrails on the stairs or have poorly lit areas? Yes, no grab bars in bathroom  Do you feel threatened or controlled by a partner, ex-partner or anyone in your life? No  Has anyone hurt you physically, for example by pushing, hitting, slapping or kicking you   or forcing you to have sex? No  Do you need help with the phone, transportation, shopping, preparing meals, housework, laundry, medications or managing money? No   Have you noticed any hearing difficulties? Yes, has bilateral hearing aids      Risk Behaviors and Healthy Habits     How many servings of fruits and vegetables do you eat a day? 4  How often do you exercise and what do you do? 30-60 minutes 3 times a week  Do you frequently ride without a seatbelt? No  Do you use tobacco?  No  Do you use any other drugs? No       Do you use alcohol? Yes, 1 glass of wine per month, occasional drinker      Sexual Health     Are you sexually active? No   If yes, with men, women, or both? N/A   If yes, do you more than one current partner?N/A  If yes, do you use condoms? N/A  Have you had any sexually transmitted infections? No  Any sexual concerns? No       FOR WOMEN ONLY  What year did you stop having periods? 2000  Any vaginal bleeding in the last year? No  Have you ever had an abnormal Pap smear? No    Katharina Alcantar EMT at 9:23 AM on 4/2/2021    PHYSICAL EXAM:  BP (!) 147/67 (BP Location: Right arm, Patient Position: Sitting, Cuff Size: Adult Regular)   Pulse 72   Wt 57.6 kg (127 lb)   SpO2 100%    Breastfeeding No   BMI 19.93 kg/m    Constitutional: no distress, comfortable, pleasant   Eyes: anicteric, normal extra-ocular movements   Ears, Nose and Throat: tympanic membranes clear, neck supple with full range of motion, no thyromegaly.   Cardiovascular: regular rate and rhythm, normal S1 and S2, no murmurs, rubs or gallops, peripheral pulses full and symmetric   Respiratory: clear to auscultation, no wheezes or crackles, normal breath sounds   Gastrointestinal: positive bowel sounds, nontender, no hepatosplenomegaly, no masses   Musculoskeletal: knees full range of motion, no effusion  Skin: no concerning lesions, no jaundice   Neurological: normal gait, no tremor   Psychological: appropriate mood   Lymphatic: no cervical lymphadenopathy and no pedal edema        A/P Sarah was seen today for physical/wellness exam and to establish care    Encounter for immunization  -     TDAP VACCINE (Adacel, Boostrix)  [[id number]]    Plantar fasciitis, right  -     Orthopedic & Spine  Referral; Future    Lichen planus  -     fluocinonide (LIDEX) 0.05 % external gel; Apply topically 2 times daily    Pulmonary HTN (H):  I recommend repeating the echo in 6 months to reassess    Urinary retention  -     UROLOGY ADULT REFERRAL    RTC in 6 months for f/u    Kelley Hernandez MD

## 2021-04-02 NOTE — NURSING NOTE
Chief Complaint   Patient presents with     Physical     annual physical       ANDERSON Washington at 7:53 AM on 4/2/2021

## 2021-04-05 RX ORDER — HEPARIN SODIUM,PORCINE 10 UNIT/ML
5 VIAL (ML) INTRAVENOUS
Status: CANCELLED | OUTPATIENT
Start: 2021-04-06

## 2021-04-05 RX ORDER — HEPARIN SODIUM (PORCINE) LOCK FLUSH IV SOLN 100 UNIT/ML 100 UNIT/ML
5 SOLUTION INTRAVENOUS
Status: CANCELLED | OUTPATIENT
Start: 2021-04-05

## 2021-04-05 RX ORDER — ZOLEDRONIC ACID 5 MG/100ML
5 INJECTION, SOLUTION INTRAVENOUS ONCE
Status: CANCELLED
Start: 2021-04-06 | End: 2021-04-06

## 2021-04-05 RX ORDER — HEPARIN SODIUM,PORCINE 10 UNIT/ML
5 VIAL (ML) INTRAVENOUS
Status: CANCELLED | OUTPATIENT
Start: 2021-04-05

## 2021-04-05 RX ORDER — ZOLEDRONIC ACID 5 MG/100ML
5 INJECTION, SOLUTION INTRAVENOUS ONCE
Status: CANCELLED
Start: 2021-04-05 | End: 2021-04-05

## 2021-04-05 RX ORDER — HEPARIN SODIUM (PORCINE) LOCK FLUSH IV SOLN 100 UNIT/ML 100 UNIT/ML
5 SOLUTION INTRAVENOUS
Status: CANCELLED | OUTPATIENT
Start: 2021-04-06

## 2021-04-06 ENCOUNTER — ANCILLARY PROCEDURE (OUTPATIENT)
Dept: BONE DENSITY | Facility: CLINIC | Age: 71
End: 2021-04-06
Attending: FAMILY MEDICINE
Payer: COMMERCIAL

## 2021-04-06 ENCOUNTER — INFUSION THERAPY VISIT (OUTPATIENT)
Dept: INFUSION THERAPY | Facility: CLINIC | Age: 71
End: 2021-04-06
Attending: INTERNAL MEDICINE
Payer: COMMERCIAL

## 2021-04-06 ENCOUNTER — APPOINTMENT (OUTPATIENT)
Dept: LAB | Facility: CLINIC | Age: 71
End: 2021-04-06
Attending: INTERNAL MEDICINE
Payer: COMMERCIAL

## 2021-04-06 ENCOUNTER — ANCILLARY PROCEDURE (OUTPATIENT)
Dept: MAMMOGRAPHY | Facility: CLINIC | Age: 71
End: 2021-04-06
Attending: INTERNAL MEDICINE
Payer: COMMERCIAL

## 2021-04-06 VITALS
OXYGEN SATURATION: 100 % | BODY MASS INDEX: 20.18 KG/M2 | DIASTOLIC BLOOD PRESSURE: 75 MMHG | TEMPERATURE: 97.2 F | HEART RATE: 72 BPM | SYSTOLIC BLOOD PRESSURE: 110 MMHG | WEIGHT: 128.6 LBS | RESPIRATION RATE: 16 BRPM

## 2021-04-06 DIAGNOSIS — Z12.31 VISIT FOR SCREENING MAMMOGRAM: ICD-10-CM

## 2021-04-06 DIAGNOSIS — M85.9 LOW BONE DENSITY: ICD-10-CM

## 2021-04-06 DIAGNOSIS — T88.1XXD: ICD-10-CM

## 2021-04-06 DIAGNOSIS — K21.9 GASTROESOPHAGEAL REFLUX DISEASE, UNSPECIFIED WHETHER ESOPHAGITIS PRESENT: Primary | ICD-10-CM

## 2021-04-06 DIAGNOSIS — M81.0 AGE-RELATED OSTEOPOROSIS WITHOUT CURRENT PATHOLOGICAL FRACTURE: ICD-10-CM

## 2021-04-06 DIAGNOSIS — M85.852 OSTEOPENIA OF LEFT HIP: ICD-10-CM

## 2021-04-06 LAB
CALCIUM SERPL-MCNC: 9.4 MG/DL (ref 8.5–10.1)
CREAT SERPL-MCNC: 0.78 MG/DL (ref 0.52–1.04)
GFR SERPL CREATININE-BSD FRML MDRD: 77 ML/MIN/{1.73_M2}

## 2021-04-06 PROCEDURE — 86769 SARS-COV-2 COVID-19 ANTIBODY: CPT | Performed by: FAMILY MEDICINE

## 2021-04-06 PROCEDURE — 82310 ASSAY OF CALCIUM: CPT | Performed by: INTERNAL MEDICINE

## 2021-04-06 PROCEDURE — 36415 COLL VENOUS BLD VENIPUNCTURE: CPT

## 2021-04-06 PROCEDURE — 250N000011 HC RX IP 250 OP 636: Performed by: INTERNAL MEDICINE

## 2021-04-06 PROCEDURE — 82565 ASSAY OF CREATININE: CPT | Performed by: INTERNAL MEDICINE

## 2021-04-06 PROCEDURE — 250N000013 HC RX MED GY IP 250 OP 250 PS 637: Performed by: INTERNAL MEDICINE

## 2021-04-06 PROCEDURE — 77063 BREAST TOMOSYNTHESIS BI: CPT | Performed by: RADIOLOGY

## 2021-04-06 PROCEDURE — 77080 DXA BONE DENSITY AXIAL: CPT | Performed by: INTERNAL MEDICINE

## 2021-04-06 PROCEDURE — 77067 SCR MAMMO BI INCL CAD: CPT | Performed by: RADIOLOGY

## 2021-04-06 PROCEDURE — 96365 THER/PROPH/DIAG IV INF INIT: CPT

## 2021-04-06 RX ORDER — ACETAMINOPHEN 325 MG/1
650 TABLET ORAL ONCE
Status: CANCELLED
Start: 2021-04-06 | End: 2021-04-06

## 2021-04-06 RX ORDER — HEPARIN SODIUM (PORCINE) LOCK FLUSH IV SOLN 100 UNIT/ML 100 UNIT/ML
5 SOLUTION INTRAVENOUS
Status: CANCELLED | OUTPATIENT
Start: 2021-04-06

## 2021-04-06 RX ORDER — ACETAMINOPHEN 325 MG/1
650 TABLET ORAL ONCE
Status: COMPLETED | OUTPATIENT
Start: 2021-04-06 | End: 2021-04-06

## 2021-04-06 RX ORDER — ZOLEDRONIC ACID 5 MG/100ML
5 INJECTION, SOLUTION INTRAVENOUS ONCE
Status: CANCELLED
Start: 2021-04-06 | End: 2021-04-06

## 2021-04-06 RX ORDER — HEPARIN SODIUM,PORCINE 10 UNIT/ML
5 VIAL (ML) INTRAVENOUS
Status: CANCELLED | OUTPATIENT
Start: 2021-04-06

## 2021-04-06 RX ORDER — ZOLEDRONIC ACID 5 MG/100ML
5 INJECTION, SOLUTION INTRAVENOUS ONCE
Status: COMPLETED | OUTPATIENT
Start: 2021-04-06 | End: 2021-04-06

## 2021-04-06 RX ADMIN — ACETAMINOPHEN 650 MG: 325 TABLET ORAL at 09:58

## 2021-04-06 RX ADMIN — ZOLEDRONIC ACID 5 MG: 0.05 INJECTION, SOLUTION INTRAVENOUS at 09:58

## 2021-04-06 ASSESSMENT — PAIN SCALES - GENERAL: PAINLEVEL: NO PAIN (0)

## 2021-04-06 NOTE — LETTER
4/6/2021         RE: Sarah العلي  [address]  [place] [address]        Dear Colleague,    Thank you for referring your patient, Sarah العلي, to the [facility] TREATMENT [facility]. Please see a copy of my visit note below.    Nursing Note  Sarah العلي [facility] for:   Chief Complaint   Patient presents with     Blood Draw     Labs drawn via PIV placed by RN in lab. VS taken.      Infusion     IV Reclast     During today's [facility] appointment, orders from Magdalena Davis MD were completed.  Frequency: annual    Progress note:  Patient identification verified by name and date of birth.  Assessment completed.  Vitals recorded in Doc Flowsheets.  Patient was provided with education regarding medication/procedure and possible side effects.  Patient verbalized understanding.     present during visit today: Not Applicable.    Treatment Conditions: Patient's Creatinine Clearance and calcium within paramaters to administer medication per orders.       Note: Patient states after last Reclast infusion she developed fever (102 F), chills, and body aches that lasted 24 hours. States she did not discuss above symptoms with provider. Provider paged to confirm safe to proceed today.     Per Dr. De Luna, Reclast to be infused over 45 minutes . Tylenol added as pre-med. Patient to drink plenty of water today to help minimize symptoms. Above was discussed with patient.     Premedications: were not ordered.    Drug Waste Record: No    Infusion length and rate:  infusion given over approximately 45 minutes per provider request.     Labs: drawn today, prior to appointment in second floor lab.    Vascular access: peripheral IV placed today in lab.     Is the next appt scheduled? No, therapy complete.  Asymptomatic COVID test completed? Negative 4/2    Post Infusion  Assessment:  Patient tolerated infusion without incident.  Site patent and intact, free from redness, edema or discomfort.  No evidence of extravasations.  Access discontinued per protocol.     Discharge Plan:   AVS given to patient.   Follow up plan of care with: ordering provider as scheduled.  Discharge instructions were reviewed with patient.  Patient/representative verbalized understanding of discharge instructions and all questions answered.  Patient discharged [facility] in stable condition.    Administrations This Visit     acetaminophen (TYLENOL) tablet 650 mg     Admin Date  04/06/2021 Action  Given Dose  650 mg Route  Oral Administered By  Hellen Gresham, NORTH          zoledronic Acid (RECLAST) infusion 5 mg     Admin Date  04/06/2021 Action  New Bag Dose  5 mg Rate  400 mL/hr Route  Intravenous Administered By  Hellen Gresham, NORTH              /76   Pulse 68   Temp 97.2  F (36.2  C) (Oral)   Resp 16   Wt 58.3 kg (128 lb 9.6 oz)   SpO2 100%   BMI 20.18 kg/m            Again, thank you for allowing me to participate in the care of your patient.        Sincerely,        [facility]

## 2021-04-06 NOTE — PROGRESS NOTES
Sarah العلي  is being evaluated via a billable video visit.      How would you like to obtain your AVS? [email or web address]  For the video visit, send the invitation by: Send to e-mail at: [email or web address]  Will anyone else be joining your video visit? No

## 2021-04-06 NOTE — PATIENT INSTRUCTIONS
Dear Sarah العلي    Thank you for choosing [facility] ([facility]) for your infusion.  The following information is a summary of our appointment as well as important reminders.      Please drink plenty of water to help minimize common side effects of Reclast. If needed, can take Tylenol in 6 hours.     What is this medicine?  ZOLEDRONIC ACID (CARA le dron ik AS id) lowers the amount of calcium loss from bone. It is used to treat Paget's disease and osteoporosis in women.  This medicine may be used for other purposes; ask your health care provider or pharmacist if you have questions.  What should I tell my health care provider before I take this medicine?  They need to know if you have any of these conditions:    aspirin-sensitive asthma    cancer, especially if you are receiving medicines used to treat cancer    dental disease or wear dentures    infection    kidney disease    low levels of calcium in the blood    past surgery on the parathyroid gland or intestines    receiving corticosteroids like dexamethasone or prednisone    an unusual or allergic reaction to zoledronic acid, other medicines, foods, dyes, or preservatives    pregnant or trying to get pregnant    breast-feeding  How should I use this medicine?  This medicine is for infusion into a vein. It is given by a health care professional in a hospital or clinic setting.  Talk to your pediatrician regarding the use of this medicine in children. This medicine is not approved for use in children.  Overdosage: If you think you have taken too much of this medicine contact a poison control center or emergency room at once.  NOTE: This medicine is only for you. Do not share this medicine with others.  What if I miss a dose?  It is important not to miss your dose. Call your doctor or health care professional if you are unable to keep an appointment.  What may interact with this medicine?    certain  antibiotics given by injection    NSAIDs, medicines for pain and inflammation, like ibuprofen or naproxen    some diuretics like bumetanide, furosemide    teriparatide  This list may not describe all possible interactions. Give your health care provider a list of all the medicines, herbs, non-prescription drugs, or dietary supplements you use. Also tell them if you smoke, drink alcohol, or use illegal drugs. Some items may interact with your medicine.  What should I watch for while using this medicine?  Visit your doctor or health care professional for regular checkups. It may be some time before you see the benefit from this medicine. Do not stop taking your medicine unless your doctor tells you to. Your doctor may order blood tests or other tests to see how you are doing.  Women should inform their doctor if they wish to become pregnant or think they might be pregnant. There is a potential for serious side effects to an unborn child. Talk to your health care professional or pharmacist for more information.  You should make sure that you get enough calcium and vitamin D while you are taking this medicine. Discuss the foods you eat and the vitamins you take with your health care professional.  Some people who take this medicine have severe bone, joint, and/or muscle pain. This medicine may also increase your risk for jaw problems or a broken thigh bone. Tell your doctor right away if you have severe pain in your jaw, bones, joints, or muscles. Tell your doctor if you have any pain that does not go away or that gets worse.  Tell your dentist and dental surgeon that you are taking this medicine. You should not have major dental surgery while on this medicine. See your dentist to have a dental exam and fix any dental problems before starting this medicine. Take good care of your teeth while on this medicine. Make sure you see your dentist for regular follow-up appointments.  What side effects may I notice from  receiving this medicine?  Side effects that you should report to your doctor or health care professional as soon as possible:    allergic reactions like skin rash, itching or hives, swelling of the face, lips, or tongue    anxiety, confusion, or depression    breathing problems    changes in vision    eye pain    feeling faint or lightheaded, falls    jaw pain, especially after dental work    mouth sores    muscle cramps, stiffness, or weakness    redness, blistering, peeling or loosening of the skin, including inside the mouth    trouble passing urine or change in the amount of urine  Side effects that usually do not require medical attention (report to your doctor or health care professional if they continue or are bothersome):    bone, joint, or muscle pain    constipation    diarrhea    fever    hair loss    irritation at site where injected    loss of appetite    nausea, vomiting    stomach upset    trouble sleeping    trouble swallowing    weak or tired  This list may not describe all possible side effects. Call your doctor for medical advice about side effects. You may report side effects to FDA at 9-483-IPT-9220.  Where should I keep my medicine?  This drug is given in a hospital or clinic and will not be stored at home.  NOTE:This sheet is a summary. It may not cover all possible information. If you have questions about this medicine, talk to your doctor, pharmacist, or health care provider. Copyright  2016 Gold Standard             We look forward in seeing you on your next appointment here at [facility] ([facility]).  Please don t hesitate to call us at [phone] to reschedule any of your appointments or to speak with one of the [facility] registered nurses.  It was a pleasure taking care of you today.    Sincerely,    [facility]  [facility]  [address] [place] [address]  [address]  Phone:  [phone]

## 2021-04-06 NOTE — PROGRESS NOTES
Nursing Note  Sarah العلي presents [facility] for:   Chief Complaint   Patient presents with     Blood Draw     Labs drawn via PIV placed by RN in lab. VS taken.      Infusion     IV Reclast     During [facility]'s [facility] appointment, orders from Magdalena Davis MD were completed.  Frequency: annual    Progress note:  Patient identification verified by name and date of birth.  Assessment completed.  Vitals recorded in Doc Flowsheets.  Patient was provided with education regarding medication/procedure and possible side effects.  Patient verbalized understanding.     present during visit today: Not Applicable.    Treatment Conditions: Patient's Creatinine Clearance and calcium within paramaters to administer medication per orders.       Note: Patient states after last Reclast infusion she developed fever (102 F), chills, and body aches that lasted 24 hours. States she did not discuss above symptoms with provider. Provider paged to confirm safe to proceed today.     Per Dr. De Luna, Reclast to be infused over 45 minutes . Tylenol added as pre-med. Patient to drink plenty of water today to help minimize symptoms. Above was discussed with patient.     Premedications: were not ordered.    Drug Waste Record: No    Infusion length and rate:  infusion given over approximately 45 minutes per provider request.     Labs: drawn today, prior to appointment in second floor lab.    Vascular access: peripheral IV placed today in lab.     Is the next appt scheduled? No, therapy complete.  Asymptomatic COVID test completed? Negative 4/2    Post Infusion Assessment:  Patient tolerated infusion without incident.  Site patent and intact, free from redness, edema or discomfort.  No evidence of extravasations.  Access discontinued per protocol.     Discharge Plan:   AVS given to patient.   Follow up plan of care with: ordering provider as  scheduled.  Discharge instructions were reviewed with patient.  Patient/representative verbalized understanding of discharge instructions and all questions answered.  Patient [facility] in stable condition.    Administrations This Visit     acetaminophen (TYLENOL) tablet 650 mg     Admin Date  04/06/2021 Action  Given Dose  650 mg Route  Oral Administered By  Hellen Gresham, NORTH          zoledronic Acid (RECLAST) infusion 5 mg     Admin Date  04/06/2021 Action  New Bag Dose  5 mg Rate  400 mL/hr Route  Intravenous Administered By  Hellen Gresham, NORTH              /76   Pulse 68   Temp 97.2  F (36.2  C) (Oral)   Resp 16   Wt 58.3 kg (128 lb 9.6 oz)   SpO2 100%   BMI 20.18 kg/m

## 2021-04-07 NOTE — TELEPHONE ENCOUNTER
Patient called and told that dr lynn could help with both prolapse and alittle retention issue Gricelda Mcneil, CATHERINEN Staff Nurse

## 2021-04-07 NOTE — TELEPHONE ENCOUNTER
[facility] Call Center    Phone Message    May a detailed message be left on voicemail: yes     Reason for Call: Other: Pt requesting to speak to Gricelda in regards to symptoms.  Pt states she is returning a call from Gricelda.  Please call at .     Action Taken: Message routed to:  [facility] ([facility] Urology    Travel Screening: Not Applicable

## 2021-04-08 LAB
SARS-COV-2 AB PNL SERPL IA: POSITIVE
SARS-COV-2 IGG SERPL IA-ACNC: NORMAL

## 2021-04-08 NOTE — TELEPHONE ENCOUNTER
MEDICAL RECORDS REQUEST   [facility]  Urology Clinic  [address] [place] [address]  PHONE: [phone]  Fax: [phone]        FUTURE VISIT INFORMATION                                                   Sarah العلي, : 1950 scheduled for future visit at [facility]    APPOINTMENT INFORMATION:    Date: 2021    Provider:  Shavon GARCIA    Reason for Visit/Diagnosis: Prolapse     REFERRAL INFORMATION:    Referring provider:  N/A    Specialty: N/A    Referring providers clinic:      Clinic contact number:  N/A    RECORDS REQUESTED FOR VISIT                                                     NOTES  STATUS/DETAILS   OFFICE NOTE from referring provider  yes   OFFICE NOTE from other specialist  no   DISCHARGE SUMMARY from hospital  no   DISCHARGE REPORT from the ER  no   OPERATIVE REPORT  no   MEDICATION LIST  no     PRE-VISIT CHECKLIST      Record collection complete Yes   Appointment appropriately scheduled           (right time/right provider) Yes   MyChart activation Yes   Questionnaire complete If no, please explain: In process      Completed by: Camilla Cohen

## 2021-04-09 ENCOUNTER — VIRTUAL VISIT (OUTPATIENT)
Dept: ENDOCRINOLOGY | Facility: CLINIC | Age: 71
End: 2021-04-09
Payer: COMMERCIAL

## 2021-04-09 DIAGNOSIS — M81.0 AGE-RELATED OSTEOPOROSIS WITHOUT CURRENT PATHOLOGICAL FRACTURE: Primary | ICD-10-CM

## 2021-04-09 PROCEDURE — 99214 OFFICE O/P EST MOD 30 MIN: CPT | Mod: 95 | Performed by: INTERNAL MEDICINE

## 2021-04-09 NOTE — PROGRESS NOTES
Endocrinology Note         Sarah is a 70 year old female presents today for osteoporosis.    HPI  Sarah is a 70 year old female with hx of right internal carotid artery aneurysm, ocular migraine with aura, Raynaud's phenomenon, GERD presents today for follow up osteoporosis    Patient also with osteopenia for long time.  DXA scan in 2013 showed T score of -2.4 left femoral neck.  She has had serial DXA scan every 2 or 3 years that continues to show osteopenia in the left femoral neck.  Patient denies history of fracture. Her mother may have had osteoporosis.  She has been on proton pump inhibitor for more than 10 years for GERD. She was on several short course of prednisone for erythema multiforme. Her last prednisone course was 3 years ago.  She has never been on any steroid injection.  She denies any history of rheumatoid arthritis or autoimmune disease.  She does not smoked.  She drinks wine a couple of time a month.  Weight has been stable.  BMI ranged in 20 kg/m2.       Interval history:  She received Reclast infusion 4/2019 and 4/2021. She missed the infusion last year due to COVID pandemic. She has not had reaction with the recent infusion.     She reports doing well. No fracture or fall. She does not take calcium supplement due to constipation. She reports having about 3 serving of dairy per day. She is active and walks regularly. She denies imbalance or gait instability.     DXA 4/6/2021 showed the lowest T-score of -2.5 at the level of the left femoral neck. There have been decline of BMD in the lumbar spine compared to the previous scan (1/9/2020). There have been increase in BMD at the right total hip compared to scan in 2018.    Past Medical History  Past Medical History:   Diagnosis Date     Age-related osteoporosis without current pathological fracture 2/22/2019     Benign positional vertigo decades     Bilateral hearing loss, unspecified hearing loss type 12/6/2018     CARDIOVASCULAR SCREENING;  "LDL GOAL LESS THAN 160 7/17/2013     Cerebral aneurysm 12/2017     Erythema multiforme 9/26/2016     GERD (gastroesophageal reflux disease)      Hearing problem '04 & \"18    SNHL mild L ear;moderate-severe R ear  >75% loss word compre     Hematuria 9/26/2016    Overview:  Has had urologic evaluation     Laryngospasm 9/26/2016     Lichen planus      Migraine     with auora     Migraines 2000    Ocular migraine/ Migraine with Aura     Ocular migraine 9/26/2016     Oral lichen planus 9/26/2016     Osteopenia 2010     Osteopenia of left hip 12/12/2018     Physical exam 9/26/2016    Overview:  Full code.  Would want her  and son to make medical decisions for her if she were unable to do so.  Does not have an advanced directive.     Overview:  Diet - tries to eat a healthy diet  Exercise - \"I'm a fair weather walker\"  Active during the day Mammogram - 10/2017  Colonoscopy - 2/2008 - next in 2018  DXA - 8/2015 - next in 2018  Immunizations -  Up to date      Right internal carotid artery aneurysm 12/6/2018    5 mm     Sensorineural hearing loss 2/04 & 4/18 2/18 worsened     Tinnitus 12/2018    R ear only     White coat syndrome without diagnosis of hypertension 12/6/2018       Allergies  Allergies   Allergen Reactions     Atorvastatin Rash     Nickel Rash     Other reaction(s): *Unknown  Other reaction(s): *Unknown     Medications  Current Outpatient Medications   Medication Sig Dispense Refill     albuterol (PROAIR HFA/PROVENTIL HFA/VENTOLIN HFA) 108 (90 Base) MCG/ACT inhaler Inhale 2 puffs into the lungs as needed 8.5 g 3     Cholecalciferol (VITAMIN D3) 1000 UNITS CAPS Take 1 capsule by mouth daily       fluocinonide (LIDEX) 0.05 % external gel Apply topically 2 times daily 15 g 3     methylcellulose (CITRUCEL) 500 MG TABS tablet        omeprazole (PRILOSEC) 10 MG DR capsule Take 20 mg by mouth as needed       Family History  family history includes Breast Cancer in her paternal aunt; Cancer in her paternal " grandmother; Cerebrovascular Disease in her father; Diabetes in her cousin and maternal grandmother; Heart Disease in her maternal grandfather; Heart Failure in her maternal grandfather; Other - See Comments in her mother.   Maternal grandmother had type 1 diabetes  Cousin has type 1 diabetes    Social History  Social History     Tobacco Use     Smoking status: Former Smoker     Packs/day: 0.50     Years: 12.00     Pack years: 6.00     Types: Cigarettes     Start date: 1968     Quit date: 1980     Years since quittin.2     Smokeless tobacco: Never Used   Substance Use Topics     Alcohol use: Not Currently     Alcohol/week: 1.0 - 2.0 standard drinks     Comment: Minimal 1 glass wine approx. 2x/month     Drug use: No   former smoker, quit 1983  Drink wine a couple times per month  She is retired from register nurse    ROS  10 points ROS were negative otherwise mentioned in HPI    Physical Exam  Limited due to virtual visit  Constitutional: no distress, comfortable, pleasant   Psychological: appropriate mood       RESULTS  I have personally reviewed labs and images. I also reviewed labs with patient and discussed the result and plan of care.    DXA 2013  FINDINGS: The worst lumbar spine T-score measurement is -0.7 at L2.   The right femoral neck T-score measurement is -2.0 and the left femoral neck measurement is -2.4, compared with a measurement of -1.6 on the 2011 exam. The bone density measurement left femoral neck is 0.702 gm/cm2.     IMPRESSION: Prominent osteopenia/borderline osteoporosis in the femoral neck, progressed since .    DXA 8/10/2015  T-SCORES:  Lumbar Spine L1-L4 T-score: -0.5     Left Hip (Neck) T-score: -2.3  Right Hip (Neck) T-score: -1.9     Hip lowest BMD: 0.712 gm/cm2.     PERCENT CHANGE since 2013:  Lumbar Spine: +0.1%   Femurs: +2.7%     IMPRESSION: Bilateral hip osteopenia.     DXA 2018      DXA 2020  Conclusions:  The most negative and valid T-score of  -2.4 at the level of the left femoral neck corresponds with low bone density according to WHO criteria for postmenopausal females and men age 50 and over.      The risk of osteoporotic fracture increases approximately 2-fold for each 1.0 SD decrease in T-score.     Comparison Exams:  Taking into account the precision errors (ref #3 below) for this center:  These calculated changes in BMD to the previous exam (2018) are significantly increased at the level of the lumbar spine and right total hip by 4% and 3.4%    DXA 4/6/2021  Conclusions:  The most negative and valid T-score of -2.5 at the level of the left femoral neck corresponds with osteoporosis according to WHO criteria for postmenopausal females and men age 50 and over.       Comparison Exams:  Taking into account the precision errors (ref #3 below) for this center:  These calculated changes in BMD to the previous exam (1/9/2020) are significantly decreased at the level of the lumbar spine (-2.8%)  These calculated changes in BMD to the baseline exam (12/21/2018) are significantly increased at the level of the right total hip (+2.9%)    ASSESSMENT:    Sarah is a 68 year old female with hx of right internal carotid artery aneurysm, ocular migraine with aura, Raynaud's phenomenon, GERD presents today for osteoporosis    1) Osteoporosis: she has had long standing low bone density at least since 2013. The most recent DXA in 12/2018 showed osteoporosis particularly in the left hip. She is at risk from hip fracture.   - risk factors includes prolonged use of proton pump inhibitor, family hx of osteoporosis, intermittent use of steroid  - she received Reclast infusion 4/2019 and 4/2021 without side effects.  - DXA 4/6/2021 showed the lowest T-score of -2.5 at the level of the left femoral neck. There have been decline of BMD in the lumbar spine compared to the previous scan (1/9/2020). There have been increase in BMD at the right total hip compared to scan in  2018.    - she is advised to optimize calcium from dairy and vitamin D supplement  - she will continue with regular weight bear exercise -- walking    PLAN:   - plan for Reclast infusion next year (4/2022)  - repeat DXA in 1 year    VDO start 0934 AM  VDO end 0944 AM  VDO duration: 10 minutes    External notes/medical records independently reviewed, labs and imaging independently reviewed, medical management and tests to be discussed/communicated to patient.    Time: I spent 30 minutes spent on the date of the encounter preparing to see patient (including chart review and preparation), obtaining and or reviewing additional medical history, performing a limited physical exam and evaluation, documenting clinical information in the electronic health record, independently interpreting results, communicating results to the patient and coordinating care.      Magdalena Davis MD  Division of Diabetes and Endocrinology  Department of Medicine  [phone]

## 2021-04-09 NOTE — LETTER
4/9/2021       RE: Sarah العلي  [address]  [place] [address]     Dear Colleague,    Thank you for referring your patient, Sarah العلي, to the [facility] [place] [facility]. Please see a copy of my visit note below.    Sarah العلي  is being evaluated via a billable video visit.      How would you like to obtain your AVS? [employer]  For the video visit, send the invitation by: Send to e-mail at: [email or web address]  Will anyone else be joining your video visit? No                 Endocrinology Note         Sraah is a 70 year old female presents today for osteoporosis.    HPI  Sarah is a 70 year old female with hx of right internal carotid artery aneurysm, ocular migraine with aura, Raynaud's phenomenon, GERD presents today for follow up osteoporosis    Patient also with osteopenia for long time.  DXA scan in 2013 showed T score of -2.4 left femoral neck.  She has had serial DXA scan every 2 or 3 years that continues to show osteopenia in the left femoral neck.  Patient denies history of fracture. Her mother may have had osteoporosis.  She has been on proton pump inhibitor for more than 10 years for GERD. She was on several short course of prednisone for erythema multiforme. Her last prednisone course was 3 years ago.  She has never been on any steroid injection.  She denies any history of rheumatoid arthritis or autoimmune disease.  She does not smoked.  She drinks wine a couple of time a month.  Weight has been stable.  BMI ranged in 20 kg/m2.       Interval history:  She received Reclast infusion 4/2019 and 4/2021. She missed the infusion last year due to COVID pandemic. She has not had reaction with the recent infusion.     She reports doing well. No fracture or fall. She does not take calcium supplement due to constipation. She reports having about 3 serving of dairy per day. She is active and walks  "regularly. She denies imbalance or gait instability.     DXA 4/6/2021 showed the lowest T-score of -2.5 at the level of the left femoral neck. There have been decline of BMD in the lumbar spine compared to the previous scan (1/9/2020). There have been increase in BMD at the right total hip compared to scan in 2018.    Past Medical History  Past Medical History:   Diagnosis Date     Age-related osteoporosis without current pathological fracture 2/22/2019     Benign positional vertigo decades     Bilateral hearing loss, unspecified hearing loss type 12/6/2018     CARDIOVASCULAR SCREENING; LDL GOAL LESS THAN 160 7/17/2013     Cerebral aneurysm 12/2017     Erythema multiforme 9/26/2016     GERD (gastroesophageal reflux disease)      Hearing problem '04 & \"18    SNHL mild L ear;moderate-severe R ear  >75% loss word compre     Hematuria 9/26/2016    Overview:  Has had urologic evaluation     Laryngospasm 9/26/2016     Lichen planus      Migraine     with auora     Migraines 2000    Ocular migraine/ Migraine with Aura     Ocular migraine 9/26/2016     Oral lichen planus 9/26/2016     Osteopenia 2010     Osteopenia of left hip 12/12/2018     Physical exam 9/26/2016    Overview:  Full code.  Would want her  and son to make medical decisions for her if she were unable to do so.  Does not have an advanced directive.     Overview:  Diet - tries to eat a healthy diet  Exercise - \"I'm a fair weather walker\"  Active during the day Mammogram - 10/2017  Colonoscopy - 2/2008 - next in 2018  DXA - 8/2015 - next in 2018  Immunizations -  Up to date      Right internal carotid artery aneurysm 12/6/2018    5 mm     Sensorineural hearing loss 2/04 & 4/18 2/18 worsened     Tinnitus 12/2018    R ear only     White coat syndrome without diagnosis of hypertension 12/6/2018       Allergies  Allergies   Allergen Reactions     Atorvastatin Rash     Nickel Rash     Other reaction(s): *Unknown  Other reaction(s): *Unknown "     Medications  Current Outpatient Medications   Medication Sig Dispense Refill     albuterol (PROAIR HFA/PROVENTIL HFA/VENTOLIN HFA) 108 (90 Base) MCG/ACT inhaler Inhale 2 puffs into the lungs as needed 8.5 g 3     Cholecalciferol (VITAMIN D3) 1000 UNITS CAPS Take 1 capsule by mouth daily       fluocinonide (LIDEX) 0.05 % external gel Apply topically 2 times daily 15 g 3     methylcellulose (CITRUCEL) 500 MG TABS tablet        omeprazole (PRILOSEC) 10 MG DR capsule Take 20 mg by mouth as needed       Family History  family history includes Breast Cancer in her paternal aunt; Cancer in her paternal grandmother; Cerebrovascular Disease in her father; Diabetes in her cousin and maternal grandmother; Heart Disease in her maternal grandfather; Heart Failure in her maternal grandfather; Other - See Comments in her mother.   Maternal grandmother had type 1 diabetes  Cousin has type 1 diabetes    Social History  Social History     Tobacco Use     Smoking status: Former Smoker     Packs/day: 0.50     Years: 12.00     Pack years: 6.00     Types: Cigarettes     Start date: 1968     Quit date: 1980     Years since quittin.2     Smokeless tobacco: Never Used   Substance Use Topics     Alcohol use: Not Currently     Alcohol/week: 1.0 - 2.0 standard drinks     Comment: Minimal 1 glass wine approx. 2x/month     Drug use: No   former smoker, quit 1983  Drink wine a couple times per month  She is retired from register nurse    ROS  10 points ROS were negative otherwise mentioned in HPI    Physical Exam  Limited due to virtual visit  Constitutional: no distress, comfortable, pleasant   Psychological: appropriate mood       RESULTS  I have personally reviewed labs and images. I also reviewed labs with patient and discussed the result and plan of care.    DXA 2013  FINDINGS: The worst lumbar spine T-score measurement is -0.7 at L2.   The right femoral neck T-score measurement is -2.0 and the left femoral neck  measurement is -2.4, compared with a measurement of -1.6 on the 2011 exam. The bone density measurement left femoral neck is 0.702 gm/cm2.     IMPRESSION: Prominent osteopenia/borderline osteoporosis in the femoral neck, progressed since 2011.    DXA 8/10/2015  T-SCORES:  Lumbar Spine L1-L4 T-score: -0.5     Left Hip (Neck) T-score: -2.3  Right Hip (Neck) T-score: -1.9     Hip lowest BMD: 0.712 gm/cm2.     PERCENT CHANGE since 7/17/2013:  Lumbar Spine: +0.1%   Femurs: +2.7%     IMPRESSION: Bilateral hip osteopenia.     DXA 12/21/2018      DXA 1/9/2020  Conclusions:  The most negative and valid T-score of -2.4 at the level of the left femoral neck corresponds with low bone density according to WHO criteria for postmenopausal females and men age 50 and over.      The risk of osteoporotic fracture increases approximately 2-fold for each 1.0 SD decrease in T-score.     Comparison Exams:  Taking into account the precision errors (ref #3 below) for this center:  These calculated changes in BMD to the previous exam (2018) are significantly increased at the level of the lumbar spine and right total hip by 4% and 3.4%    DXA 4/6/2021  Conclusions:  The most negative and valid T-score of -2.5 at the level of the left femoral neck corresponds with osteoporosis according to WHO criteria for postmenopausal females and men age 50 and over.       Comparison Exams:  Taking into account the precision errors (ref #3 below) for this center:  These calculated changes in BMD to the previous exam (1/9/2020) are significantly decreased at the level of the lumbar spine (-2.8%)  These calculated changes in BMD to the baseline exam (12/21/2018) are significantly increased at the level of the right total hip (+2.9%)    ASSESSMENT:    Sarah is a 68 year old female with hx of right internal carotid artery aneurysm, ocular migraine with aura, Raynaud's phenomenon, GERD presents today for osteoporosis    1) Osteoporosis: she has had long standing  low bone density at least since 2013. The most recent DXA in 12/2018 showed osteoporosis particularly in the left hip. She is at risk from hip fracture.   - risk factors includes prolonged use of proton pump inhibitor, family hx of osteoporosis, intermittent use of steroid  - she received Reclast infusion 4/2019 and 4/2021 without side effects.  - DXA 4/6/2021 showed the lowest T-score of -2.5 at the level of the left femoral neck. There have been decline of BMD in the lumbar spine compared to the previous scan (1/9/2020). There have been increase in BMD at the right total hip compared to scan in 2018.    - she is advised to optimize calcium from dairy and vitamin D supplement  - she will continue with regular weight bear exercise -- walking    PLAN:   - plan for Reclast infusion next year (4/2022)  - repeat DXA in 1 year    VDO start 0934 AM  VDO end 0944 AM  VDO duration: 10 minutes    External notes/medical records independently reviewed, labs and imaging independently reviewed, medical management and tests to be discussed/communicated to patient.    Time: I spent 30 minutes spent on the date of the encounter preparing to see patient (including chart review and preparation), obtaining and or reviewing additional medical history, performing a limited physical exam and evaluation, documenting clinical information in the electronic health record, independently interpreting results, communicating results to the patient and coordinating care.      Magdalena Davis MD  Division of Diabetes and Endocrinology  Department of Medicine  [phone]

## 2021-04-15 ENCOUNTER — PRE VISIT (OUTPATIENT)
Dept: UROLOGY | Facility: CLINIC | Age: 71
End: 2021-04-15

## 2021-04-16 NOTE — TELEPHONE ENCOUNTER
Pt is discharging at the recommendation of the treatment team. Pt is discharging to girlfriends family home transported by girlfriend. Pt denies having any thoughts of hurting themself or anyone else. Pt denies anxiety or depression. Pt has follow up with [facility]. Discharge instructions, including; demographic sheet, psychiatric evaluation, discharge summary, and AVS were faxed to these next level of care providers.     Reason for visit: prolapse consult     Relevant information: referred by primary care    Records/imaging/labs/orders: all records available    Pt called: no need for a call

## 2021-04-22 ENCOUNTER — VIRTUAL VISIT (OUTPATIENT)
Dept: UROLOGY | Facility: CLINIC | Age: 71
End: 2021-04-22
Attending: INTERNAL MEDICINE
Payer: COMMERCIAL

## 2021-04-22 ENCOUNTER — PRE VISIT (OUTPATIENT)
Dept: UROLOGY | Facility: CLINIC | Age: 71
End: 2021-04-22

## 2021-04-22 DIAGNOSIS — Z87.448 HISTORY OF HEMATURIA: ICD-10-CM

## 2021-04-22 DIAGNOSIS — K59.00 DIFFICULTY DEFECATING: ICD-10-CM

## 2021-04-22 PROCEDURE — 99204 OFFICE O/P NEW MOD 45 MIN: CPT | Mod: 95 | Performed by: UROLOGY

## 2021-04-22 ASSESSMENT — PAIN SCALES - GENERAL: PAINLEVEL: NO PAIN (0)

## 2021-04-22 NOTE — PROGRESS NOTES
Magdiel is a 70 year old who is being evaluated via a billable video visit.      How would you like to obtain your AVS? Mail a copy  If the video visit is dropped, the invitation should be resent by: Send to e-mail at: [email or web address]  Will anyone else be joining your video visit? No      Video Start Time: 2:03 PM    Assessment & Plan     Difficulty defecating  Given that she has had a long standing bowel issue she needs defecography  - MR Defecography wo Contrast; Future    History of hematuria  Repeat urinalysis to follow up.  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  She saw her gastroenterologist and was put on a bowel regimen.  Even after this she still has some issues with difficulty with defecation.  She was seen at [facility] where she had a RICARDO and given some kegel exercises.  Was to be scheduled for anal manometry and defecography but did not complete because of COVID.  With increased fiber and increasing water intake she has easier time passing stools, but occasional muscousy stool    She has a day in March where she had a long period of time where she could not urinate for about 8-12 hours.  She had a PVR bladder scan for about 100mL.  States has never happened before or since    Occasional small volume stress incontinence with a full bladder.  Has some urgency frequency    Denies gross hematuria, UTI, vaginal bleeding, vaginal bulge.    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      Past Medical History:   Diagnosis Date     Age-related osteoporosis without current pathological fracture 2/22/2019     Benign positional vertigo decades     Bilateral hearing loss, unspecified hearing loss type 12/6/2018     CARDIOVASCULAR SCREENING; LDL GOAL LESS THAN 160 7/17/2013     Cerebral aneurysm 12/2017     Erythema multiforme 9/26/2016     GERD (gastroesophageal reflux disease)      Hearing problem '04 & \"18    SNHL mild L ear;moderate-severe R ear  >75% loss word compre     Hematuria 9/26/2016    Overview:  Has had urologic evaluation     Laryngospasm 9/26/2016     Lichen planus      Migraine     with auora     Migraines 2000    Ocular migraine/ Migraine with Aura     Ocular migraine 9/26/2016     Oral lichen planus 9/26/2016     Osteopenia 2010     Osteopenia of left hip 12/12/2018     Physical exam 9/26/2016    Overview:  Full code.  Would want her  and son to make medical decisions for her if she were unable to do so.  Does not have an advanced directive.     Overview:  Diet - tries to eat a healthy diet  Exercise - \"I'm a fair weather walker\"  Active during the day Mammogram - 10/2017  Colonoscopy - 2/2008 - next in 2018  DXA - 8/2015 - next in 2018  Immunizations -  Up to date      Right internal carotid artery aneurysm 12/6/2018    5 mm     Sensorineural hearing loss 2/04 & 4/18 2/18 worsened     Tinnitus 12/2018    R ear only     White coat syndrome without diagnosis of hypertension 12/6/2018     Past Surgical History:   Procedure Laterality Date     DILATION AND CURETTAGE       LAPAROSCOPY       TONSILLECTOMY  1958     TONSILLECTOMY & ADENOIDECTOMY       Social History     Socioeconomic History     Marital status:      Spouse name: Not on file     Number of children: Not on file     Years of " education: Not on file     Highest education level: Not on file   Occupational History     Occupation: RN     Comment: Home [employer]   Social Needs     Financial resource strain: Not on file     Food insecurity     Worry: Not on file     Inability: Not on file     Transportation needs     Medical: Not on file     Non-medical: Not on file   Tobacco Use     Smoking status: Former Smoker     Packs/day: 0.50     Years: 12.00     Pack years: 6.00     Types: Cigarettes     Start date: 1968     Quit date: 1980     Years since quittin.3     Smokeless tobacco: Never Used   Substance and Sexual Activity     Alcohol use: Not Currently     Alcohol/week: 1.0 - 2.0 standard drinks     Comment: Minimal 1 glass wine approx. 2x/month     Drug use: No     Sexual activity: Yes     Partners: Male   Lifestyle     Physical activity     Days per week: Not on file     Minutes per session: Not on file     Stress: Not on file   Relationships     Social connections     Talks on phone: Not on file     Gets together: Not on file     Attends Buddhism service: Not on file     Active member of club or organization: Not on file     Attends meetings of clubs or organizations: Not on file     Relationship status: Not on file     Intimate partner violence     Fear of current or ex partner: Not on file     Emotionally abused: Not on file     Physically abused: Not on file     Forced sexual activity: Not on file   Other Topics Concern     Parent/sibling w/ CABG, MI or angioplasty before 65F 55M? 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 3 x's    How much calcium per day? diet       How much caffeine per day? 2-3 cups    How much vitamin D per day? 2000 iu    Do you/your family wear seatbelts?  Yes    Do you/your family use safety helmets? n/a    Do you/your family use sunscreen? Yes     Do you/your family keep firearms in the home? No    Do you/your family have a smoke detector(s)? Yes        Do you feel safe in your home? Yes    Has anyone ever touched you in an unwanted manner? No     Explain         August 4, 2015 Kaelapanfilo Grant JEFERSON             Current Outpatient Medications   Medication     albuterol (PROAIR HFA/PROVENTIL HFA/VENTOLIN HFA) 108 (90 Base) MCG/ACT inhaler     Cholecalciferol (VITAMIN D3) 1000 UNITS CAPS     fluocinonide (LIDEX) 0.05 % external gel     methylcellulose (CITRUCEL) 500 MG TABS tablet     omeprazole (PRILOSEC) 10 MG DR capsule     No current facility-administered medications for this visit.         Allergies   Allergen Reactions     Atorvastatin Rash     Nickel Rash     Other reaction(s): *Unknown  Other reaction(s): *Unknown       Review of Systems         Objective         Vitals:  No vitals were obtained today due to virtual visit.    Physical Exam   GENERAL: Healthy, alert and no distress  EYES: Eyes grossly normal to inspection.  No discharge or erythema, or obvious scleral/conjunctival abnormalities.  RESP: No audible wheeze, cough, or visible cyanosis.  No visible retractions or increased work of breathing.    SKIN: Visible skin clear. No significant rash, abnormal pigmentation or lesions.  NEURO: Cranial nerves grossly intact.  Mentation and speech appropriate for age.  PSYCH: Mentation appears normal, affect normal/bright, judgement and insight intact, normal speech and appearance well-groomed.          Video-Visit Details    Type of service:  Video Visit    Video End Time:2:23 PM    Originating Location (pt. Location): Home    Distant Location (provider location):  [facility] [place]     Platform used for Video Visit: Keraderm       Patient Care Team:  Alyson Unger MD as PCP - General (Family Practice)  Joselito Rebolledo MD as MD (Family Medicine - Sports Medicine)  Magdalena Davis MD as MD (Internal  Medicine)  Chetna Arciniega PA-C as Physician Assistant (Physician Assistant)  Jorden Almaraz Chi, OD as MD (Optometry)  Desi Calix MD as MD (Otolaryngology)  Annette Barbosa MD as Assigned Pulmonology Provider  Asher Sotelo MD as Assigned Heart and Vascular Provider  Desi Calix MD as Assigned Surgical Provider  Maye Sands MD as MD (Urology)  Jazmin Green RN as Specialty Care Coordinator (Urology)  Magdalena Davis MD as Assigned Endocrinology Provider  Alyson Unger MD as Assigned PCP  BILLY ESCOBAR      Addendum: per discussion with colorectal will plan for her to do the defecography at the [facility] not the .  Patient was notified of this and the fact that after we send a referral the [facility] should be reaching out to her

## 2021-04-22 NOTE — NURSING NOTE
Chief Complaint   Patient presents with     Consult For     prolapse     - Terese Collier,   EMT Clinic Support

## 2021-04-22 NOTE — PATIENT INSTRUCTIONS
Websites with free information:    American [employer] patient website: www.voicesforpfd.org    Total Control Program: www.totalcontrolprogram.com    Please do the urine test and the MRI.  If the urine test continues to show blood we will discuss a CT scan and repeating the cystoscopy (procedure to look in the bladder)    Please return to see me after MRI    Question for the clinic [phone] or [phone]    It was a pleasure meeting with you today.  Thank you for allowing me and my team the privilege of caring for you today.  YOU are the reason we are here, and I truly hope we provided you with the excellent service you deserve.  Please let us know if there is anything else we can do for you so that we can be sure you are leaving completely satisfied with your care experience.

## 2021-04-22 NOTE — LETTER
4/22/2021       RE: Sarah العلي  [address]  [place] [address]     Dear Colleague,    Thank you for referring your patient, Sarah العلي, to the [facility] [place] [facility]. Please see a copy of my visit note below.    Magdiel is a 70 year old who is being evaluated via a billable video visit.      How would you like to obtain your AVS? Mail a copy  If the video visit is dropped, the invitation should be resent by: Send to e-mail at: [email or web address]  Will anyone else be joining your video visit? No      Video Start Time: 2:03 PM    Assessment & Plan     Difficulty defecating  Given that she has had a long standing bowel issue she needs defecography  - MR Defecography wo Contrast; Future    History of hematuria  Repeat urinalysis to follow up.  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  She saw her gastroenterologist and was put on a bowel regimen.  Even after this she still has some issues with difficulty with defecation.  She was seen at [place] where she had a RICARDO and given some kegel exercises.  Was to be scheduled for anal manometry and defecography but did not complete because of COVID.  With increased fiber and increasing water intake she has easier time passing stools, but occasional muscousy stool    She has a day in March where she had a long period of time where she could not urinate for about  "8-12 hours.  She had a PVR bladder scan for about 100mL.  States has never happened before or since    Occasional small volume stress incontinence with a full bladder.  Has some urgency frequency    Denies gross hematuria, UTI, vaginal bleeding, vaginal bulge.    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      Past Medical History:   Diagnosis Date     Age-related osteoporosis without current pathological fracture 2/22/2019     Benign positional vertigo decades     Bilateral hearing loss, unspecified hearing loss type 12/6/2018     CARDIOVASCULAR SCREENING; LDL GOAL LESS THAN 160 7/17/2013     Cerebral aneurysm 12/2017     Erythema multiforme 9/26/2016     GERD (gastroesophageal reflux disease)      Hearing problem '04 & \"18    SNHL mild L ear;moderate-severe R ear  >75% loss word compre     Hematuria 9/26/2016    Overview:  Has had urologic evaluation     Laryngospasm 9/26/2016     Lichen planus      Migraine     with auora     Migraines 2000    Ocular migraine/ Migraine with Aura     Ocular migraine 9/26/2016     Oral lichen planus 9/26/2016     Osteopenia 2010     Osteopenia of left hip 12/12/2018     Physical exam 9/26/2016    Overview:  Full code.  Would want her  and son to make medical decisions for her if she were unable to do so.  Does not have an advanced directive.     Overview:  Diet - tries to eat a healthy diet  Exercise - \"I'm a fair weather walker\"  Active during the day Mammogram - 10/2017  Colonoscopy - 2/2008 - next in 2018  DXA - 8/2015 - next in 2018  Immunizations -  Up to date      Right internal carotid artery aneurysm 12/6/2018    5 mm     Sensorineural hearing loss 2/04 & 4/18 2/18 worsened     Tinnitus 12/2018    R ear only     White coat syndrome without diagnosis of hypertension " 2018     Past Surgical History:   Procedure Laterality Date     DILATION AND CURETTAGE       LAPAROSCOPY       TONSILLECTOMY       TONSILLECTOMY & ADENOIDECTOMY       Social History     Socioeconomic History     Marital status:      Spouse name: Not on file     Number of children: Not on file     Years of education: Not on file     Highest education level: Not on file   Occupational History     Occupation: RN     Comment: Home [employer]   Social Needs     Financial resource strain: Not on file     Food insecurity     Worry: Not on file     Inability: Not on file     Transportation needs     Medical: Not on file     Non-medical: Not on file   Tobacco Use     Smoking status: Former Smoker     Packs/day: 0.50     Years: 12.00     Pack years: 6.00     Types: Cigarettes     Start date: 1968     Quit date: 1980     Years since quittin.3     Smokeless tobacco: Never Used   Substance and Sexual Activity     Alcohol use: Not Currently     Alcohol/week: 1.0 - 2.0 standard drinks     Comment: Minimal 1 glass wine approx. 2x/month     Drug use: No     Sexual activity: Yes     Partners: Male   Lifestyle     Physical activity     Days per week: Not on file     Minutes per session: Not on file     Stress: Not on file   Relationships     Social connections     Talks on phone: Not on file     Gets together: Not on file     Attends Christianity service: Not on file     Active member of club or organization: Not on file     Attends meetings of clubs or organizations: Not on file     Relationship status: Not on file     Intimate partner violence     Fear of current or ex partner: Not on file     Emotionally abused: Not on file     Physically abused: Not on file     Forced sexual activity: Not on file   Other Topics Concern     Parent/sibling w/ CABG, MI or angioplasty before 65F 55M? 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 3 x's    How much calcium per day? diet       How much caffeine per day? 2-3 cups    How much vitamin D per day? 2000 iu    Do you/your family wear seatbelts?  Yes    Do you/your family use safety helmets? n/a    Do you/your family use sunscreen? Yes    Do you/your family keep firearms in the home? No    Do you/your family have a smoke detector(s)? Yes        Do you feel safe in your home? Yes    Has anyone ever touched you in an unwanted manner? No     Explain         August 4, 2015 Alina Grant LPN             Current Outpatient Medications   Medication     albuterol (PROAIR HFA/PROVENTIL HFA/VENTOLIN HFA) 108 (90 Base) MCG/ACT inhaler     Cholecalciferol (VITAMIN D3) 1000 UNITS CAPS     fluocinonide (LIDEX) 0.05 % external gel     methylcellulose (CITRUCEL) 500 MG TABS tablet     omeprazole (PRILOSEC) 10 MG DR capsule     No current facility-administered medications for this visit.         Allergies   Allergen Reactions     Atorvastatin Rash     Nickel Rash     Other reaction(s): *Unknown  Other reaction(s): *Unknown       Review of Systems         Objective         Vitals:  No vitals were obtained today due to virtual visit.    Physical Exam   GENERAL: Healthy, alert and no distress  EYES: Eyes grossly normal to inspection.  No discharge or erythema, or obvious scleral/conjunctival abnormalities.  RESP: No audible wheeze, cough, or visible cyanosis.  No visible retractions or increased work of breathing.    SKIN: Visible skin clear. No significant rash, abnormal pigmentation or lesions.  NEURO: Cranial nerves grossly intact.  Mentation and speech appropriate for age.  PSYCH: Mentation appears normal, affect normal/bright, judgement and insight intact, normal speech and appearance well-groomed.          Video-Visit Details    Type of service:  Video Visit    Video End Time:2:23 PM    Originating Location (pt. Location): Home    Distant  Location (provider location):  [facility] [place]     Platform used for Video Visit: PlayerPro     CC  Patient Care Team:  Alyson Unger MD as PCP - General (Family Practice)  Joselito Rebolledo MD as MD (Family Medicine - Sports Medicine)  Magdalena Davis MD as MD (Internal Medicine)  Chetna Arciniega PA-C as Physician Assistant (Physician Assistant)  Jorden Almaraz Chi, OD as MD (Optometry)  Desi Calix MD as MD (Otolaryngology)  Annette Barbosa MD as Assigned Pulmonology Provider  Asher Sotelo MD as Assigned Heart and Vascular Provider  Desi Calix MD as Assigned Surgical Provider  Maye Sands MD as MD (Urology)  Jazmin Green, RN as Specialty Care Coordinator (Urology)  Magdalena Davis MD as Assigned Endocrinology Provider  Alyson Unger MD as Assigned PCP  BILLY ESCOBAR      Addendum: per discussion with colorectal will plan for her to do the defecography at the [facility] not the .  Patient was notified of this and the fact that after we send a referral the [facility] should be reaching out to her

## 2021-05-03 ENCOUNTER — TELEPHONE (OUTPATIENT)
Dept: UROLOGY | Facility: CLINIC | Age: 71
End: 2021-05-03

## 2021-05-03 DIAGNOSIS — Z87.448 HISTORY OF HEMATURIA: Primary | ICD-10-CM

## 2021-05-03 LAB
ALBUMIN UR-MCNC: NEGATIVE MG/DL
APPEARANCE UR: CLEAR
BILIRUB UR QL STRIP: NEGATIVE
COLOR UR AUTO: YELLOW
GLUCOSE UR STRIP-MCNC: NEGATIVE MG/DL
HGB UR QL STRIP: ABNORMAL
HYALINE CASTS #/AREA URNS LPF: 1 /LPF (ref 0–2)
KETONES UR STRIP-MCNC: ABNORMAL MG/DL
LEUKOCYTE ESTERASE UR QL STRIP: NEGATIVE
MUCOUS THREADS #/AREA URNS LPF: PRESENT /LPF
NITRATE UR QL: NEGATIVE
PH UR STRIP: 6 PH (ref 5–7)
RBC #/AREA URNS AUTO: 12 /HPF (ref 0–2)
SOURCE: ABNORMAL
SP GR UR STRIP: 1.02 (ref 1–1.03)
SQUAMOUS #/AREA URNS AUTO: <1 /HPF (ref 0–1)
UROBILINOGEN UR STRIP-ACNC: 0.2 EU/DL (ref 0.2–1)
WBC #/AREA URNS AUTO: 1 /HPF (ref 0–5)

## 2021-05-03 PROCEDURE — 84999 UNLISTED CHEMISTRY PROCEDURE: CPT | Mod: 90 | Performed by: UROLOGY

## 2021-05-03 PROCEDURE — 99000 SPECIMEN HANDLING OFFICE-LAB: CPT | Performed by: UROLOGY

## 2021-05-03 PROCEDURE — 81003 URINALYSIS AUTO W/O SCOPE: CPT | Performed by: UROLOGY

## 2021-05-04 ENCOUNTER — TELEPHONE (OUTPATIENT)
Dept: UROLOGY | Facility: CLINIC | Age: 71
End: 2021-05-04

## 2021-05-04 ENCOUNTER — TELEPHONE (OUTPATIENT)
Dept: INTERNAL MEDICINE | Facility: CLINIC | Age: 71
End: 2021-05-04

## 2021-05-04 ENCOUNTER — TELEPHONE (OUTPATIENT)
Dept: ENDOCRINOLOGY | Facility: CLINIC | Age: 71
End: 2021-05-04

## 2021-05-04 NOTE — TELEPHONE ENCOUNTER
[facility] Call Center    Phone Message    May a detailed message be left on voicemail: yes     Reason for Call: Other: Magdiel calling to [facility] the status of her pelvic floor therapy referral. She is wondering if someone can give her a call to discuss. Thanks!     Action Taken: Message routed to:  [facility] Uro    Travel Screening: Not Applicable

## 2021-05-04 NOTE — TELEPHONE ENCOUNTER
Call returned. Pt actually had a question regarding having a prior authorization. I directed her call her insurance and the financial councilors to see if the procedure was going to require a Prior Authorization.

## 2021-05-04 NOTE — CONFIDENTIAL NOTE
[facility] Call Center    Phone Message    May a detailed message be left on voicemail: yes     Reason for Call: Other: Pt sees Dr. Nichols for her Osteoporosis, and stated that yearly she has her get a reclast infusion and a dexa scan. Pt has an issue with her dexa scan she had done this year. Her insurance covered her reclast, but she stated that they are denying her dexa scan because it was ordered as just a routine dexa scan which is only covered every two years vs if you have osteoporosis it is allowed yearly. Pt stated she is needing the clinic's help to let her insurance know that she does have osteoporosis, so it can be covered. Please review, and call Pt back to discuss how the clinic can help and what the next steps are.     Action Taken: Message routed to:  [facility] endo    Travel Screening: Not Applicable

## 2021-05-04 NOTE — TELEPHONE ENCOUNTER
[facility] Call Center    Phone Message    May a detailed message be left on voicemail: yes     Reason for Call: Other: Pt requesting call back. Pt stated that she recently had a urine test done and there were trace ketones in it. Pt wanting to discuss the significance of that       Action Taken: Message routed to:  [facility] ruddy

## 2021-05-05 ENCOUNTER — MYC MEDICAL ADVICE (OUTPATIENT)
Dept: INTERNAL MEDICINE | Facility: CLINIC | Age: 71
End: 2021-05-05

## 2021-05-05 DIAGNOSIS — Z13.1 SCREENING FOR DIABETES MELLITUS: Primary | ICD-10-CM

## 2021-05-06 ENCOUNTER — OFFICE VISIT (OUTPATIENT)
Dept: OPHTHALMOLOGY | Facility: CLINIC | Age: 71
End: 2021-05-06
Attending: OPHTHALMOLOGY
Payer: COMMERCIAL

## 2021-05-06 DIAGNOSIS — H25.13 AGE-RELATED NUCLEAR CATARACT OF BOTH EYES: ICD-10-CM

## 2021-05-06 DIAGNOSIS — H52.4 HYPEROPIA OF BOTH EYES WITH ASTIGMATISM AND PRESBYOPIA: ICD-10-CM

## 2021-05-06 DIAGNOSIS — H52.03 HYPEROPIA OF BOTH EYES WITH ASTIGMATISM AND PRESBYOPIA: ICD-10-CM

## 2021-05-06 DIAGNOSIS — H04.123 DRY EYES, BILATERAL: ICD-10-CM

## 2021-05-06 DIAGNOSIS — H52.203 HYPEROPIA OF BOTH EYES WITH ASTIGMATISM AND PRESBYOPIA: ICD-10-CM

## 2021-05-06 DIAGNOSIS — H01.009: Primary | ICD-10-CM

## 2021-05-06 PROCEDURE — 92004 COMPRE OPH EXAM NEW PT 1/>: CPT | Mod: GC | Performed by: OPHTHALMOLOGY

## 2021-05-06 PROCEDURE — G0463 HOSPITAL OUTPT CLINIC VISIT: HCPCS

## 2021-05-06 ASSESSMENT — REFRACTION_MANIFEST
OD_SPHERE: +1.50
OS_ADD: +2.50
OS_AXIS: 007
OS_SPHERE: +1.25
OD_ADD: +2.50
OD_CYLINDER: +0.25
OS_CYLINDER: +0.25
OD_AXIS: 174

## 2021-05-06 ASSESSMENT — VISUAL ACUITY
METHOD_MR_RETINOSCOPY: 1
OS_SC+: -2
OD_SC+: -2
OD_SC: 20/30
METHOD: SNELLEN - LINEAR
OS_SC: 20/30

## 2021-05-06 ASSESSMENT — EXTERNAL EXAM - RIGHT EYE: OD_EXAM: NORMAL

## 2021-05-06 ASSESSMENT — EXTERNAL EXAM - LEFT EYE: OS_EXAM: NORMAL

## 2021-05-06 ASSESSMENT — CONF VISUAL FIELD
OD_NORMAL: 1
OS_NORMAL: 1
METHOD: COUNTING FINGERS

## 2021-05-06 ASSESSMENT — TONOMETRY
OS_IOP_MMHG: 15
IOP_METHOD: TONOPEN
OD_IOP_MMHG: 13

## 2021-05-06 NOTE — NURSING NOTE
Chief Complaints and History of Present Illnesses   Patient presents with     Annual Eye Exam     Chief Complaint(s) and History of Present Illness(es)     Annual Eye Exam     Laterality: both eyes    Associated symptoms: floaters and dryness.  Negative for eye pain and flashes              Comments     Pt states floaters Occ, since last visit . No flashes BE  Pt states no change in VA since last visit BE  Some dryness , states AT's made it worse BE, no eye pain.    Shereen Tilley COT 8:32 AM May 6, 2021

## 2021-05-06 NOTE — TELEPHONE ENCOUNTER
Orders faxed to [facility] for defecography.      Argenis Leon CMA  05/06/21  9:16 AM

## 2021-05-06 NOTE — PROGRESS NOTES
Chief Complaint(s) and History of Present Illness(es)     Annual Eye Exam     Laterality: both eyes    Associated symptoms: floaters and dryness.  Negative for eye pain and   flashes              Comments     Pt states floaters Occ, since last visit . No flashes BE  Pt states no change in VA since last visit BE  Some dryness , states AT's made it worse BE, no eye pain.    Shereen Jarek COT 8:32 AM May 6, 2021              Review of systems for the eyes was negative other than the pertinent positives/negatives listed in the HPI.      Assessment & Plan      Sarah العلي is a 70 year old female with the following diagnoses:   1. Blepharitis of upper eyelid    2. Dry eyes, bilateral    3. Hyperopia of both eyes with astigmatism and presbyopia    4. Age-related nuclear cataract of both eyes       Healthy, no FOHx. Hx of ICA aneurysm s/p clipping. No headaches.    Updated MRx given.  Non visually sig cataracts- functioning well    Start ATs 4x a day, WCs, and lid scrubs.   Return precautions reviewed     Patient disposition:   Return in about 1-2 years, or if symptoms worsen or fail to improve, for Annual Visit.    Danelle Tidwell MD  PGY-2 Resident Physician  Department of Ophthalmology    Attending Physician Attestation:  Complete documentation of historical and exam elements from today's encounter can be found in the full encounter summary report (not reduplicated in this progress note).  I personally obtained the chief complaint(s) and history of present illness.  I confirmed and edited as necessary the review of systems, past medical/surgical history, family history, social history, and examination findings as documented by others; and I examined the patient myself.  I personally reviewed the relevant tests, images, and reports as documented above.  I formulated and edited as necessary the assessment and plan and discussed the findings and management plan with the patient and family. . - James Tilley MD

## 2021-05-08 ENCOUNTER — MYC MEDICAL ADVICE (OUTPATIENT)
Dept: ENDOCRINOLOGY | Facility: CLINIC | Age: 71
End: 2021-05-08

## 2021-05-08 NOTE — LETTER
[facility] ENDOCRINOLOGY CLINIC [address] [address]  [phone]    FACSIMILE TRANSMITTAL SHEET sent 13 MAY 2021 at 11:18AM    TO: [employer] and [employer]   COMPANY:  [employer]  FAX NUMBER:  [phone].  PHONE NUMBER:      FROM:   PHONE:   DATE: 05/13/21  NUMBER OF PAGES:     _____URGENT _____REVIEW ONLY _____PLEASE COMMENT____PLEASE REPLY    NOTES/COMMENTS: Please see attached Letter of Appeal. Contact clinic at phone:[phone]/fax:[phone] with questions. Thank you.                   IF YOU DID NOT RECEIVE THE CORRECT NUMBER OF PAGES OR THE FAX DID NOT COME THROUGH CLEARLY, PLEASE CALL THE SENDER     CONFIDENTIALITY STATEMENT: Confidential information that may accompany this transmission contains protected health information under state and federal law and is legally privileged. This information is intended only for the use of the individual or entity named above and may be used only for carrying out treatment, payment or other healthcare operations. The recipient or person responsible for delivering this information is prohibited by law from disclosing this information without proper authorization to any other party, unless required to do so by law or regulation. If you are not the intended recipient, you are hereby notified that any review, dissemination, distribution, or copying of this message is strictly prohibited. If you have received this communication in error, please destroy the materials and contact us immediately by calling the number listed above. No response indicates that the information was received by the appropriate authorized party

## 2021-05-12 ENCOUNTER — MEDICAL CORRESPONDENCE (OUTPATIENT)
Dept: HEALTH INFORMATION MANAGEMENT | Facility: CLINIC | Age: 71
End: 2021-05-12

## 2021-05-13 NOTE — TELEPHONE ENCOUNTER
Letter faxed.   Valerie Del Cid RN on 5/13/2021 at 11:17 AM       RE      Please fax the letter written 5/12 regarding appeal for DXA scan to [facility] at the following number below.   [facility] Appeals and Grievances Dept. @       fax # [phone].     Thank you,   Magdalena

## 2021-05-20 ENCOUNTER — MYC MEDICAL ADVICE (OUTPATIENT)
Dept: UROLOGY | Facility: CLINIC | Age: 71
End: 2021-05-20

## 2021-05-20 DIAGNOSIS — Z87.448 HISTORY OF HEMATURIA: Primary | ICD-10-CM

## 2021-05-21 NOTE — TELEPHONE ENCOUNTER
[facility] Call Center    Phone Message    May a detailed message be left on voicemail: no     Reason for Call: Other: Pt, Magdiel calling to have someone call her about her Active Requests which are not correct.  Please call her today.  [phone]    Action Taken: Message routed to:  [facility] ([facility] Urology/ [facility]    Travel Screening: Not Applicable

## 2021-05-27 NOTE — PROGRESS NOTES
Pt called, she declines having a cystoscopy in the clinic, preferring to have it done under general anesthesia. Message sent to provider.    Argenis Leon CMA  05/27/21  10:27 AM

## 2021-06-30 ENCOUNTER — TELEPHONE (OUTPATIENT)
Dept: UROLOGY | Facility: CLINIC | Age: 71
End: 2021-06-30

## 2021-06-30 NOTE — TELEPHONE ENCOUNTER
Left message to have pelvic floor center let me know when this patient is scheduled     Jazmin Green, RN   Care Coordinator Urology

## 2021-06-30 NOTE — TELEPHONE ENCOUNTER
----- Message from Maye Sands MD sent at 6/30/2021  8:36 AM CDT -----  Hi Max    Can we call her to see if she had the defecography done at the [facility]?  And then there was some discussion about cystoscopy in the OR but I truthfully cannot remember what ever came of that.  We can set that up soon if she wants but I think I was waiting to see the [facility] stuff    Thanks    C  ----- Message -----  From: Maye Lane  Sent: 5/3/2021  11:15 AM CDT  To: Maye Sands MD    Patient is calling and Naima is out today.  Did this patient get referred to [facility]? I see a referral for the defecography but do they call patient?  C  ----- Message -----  From: Roman Granger MD  Sent: 4/30/2021   8:48 AM CDT  To: Maye Sands MD    Yes, go to [facility] for only defecography. Your RNCC should specify that because if not they will perform additional unnecessary testing and sometimes even a consult. Thanks for asking!  ----- Message -----  From: Maye Sands MD  Sent: 4/30/2021  12:24 AM CDT  To: Roman Granger MD    Hi    This patient has a long standing history of defecation issues.  I wanted to get defecography on her as this is what [place] was going to do for her.  I had ordered a MR defecography here prior to your last message I guess my question is would it be better to try for fluoro or should I just have my office arrange for her to go to the [facility]?    Thanks    C

## 2021-08-03 ENCOUNTER — OFFICE VISIT (OUTPATIENT)
Dept: INTERNAL MEDICINE | Facility: CLINIC | Age: 71
End: 2021-08-03
Payer: COMMERCIAL

## 2021-08-03 VITALS
BODY MASS INDEX: 19.46 KG/M2 | OXYGEN SATURATION: 100 % | SYSTOLIC BLOOD PRESSURE: 131 MMHG | WEIGHT: 124 LBS | DIASTOLIC BLOOD PRESSURE: 78 MMHG | HEART RATE: 75 BPM

## 2021-08-03 DIAGNOSIS — J98.11 ATELECTASIS: Primary | ICD-10-CM

## 2021-08-03 PROCEDURE — 99213 OFFICE O/P EST LOW 20 MIN: CPT | Mod: GE | Performed by: STUDENT IN AN ORGANIZED HEALTH CARE EDUCATION/TRAINING PROGRAM

## 2021-08-03 ASSESSMENT — PAIN SCALES - GENERAL: PAINLEVEL: NO PAIN (0)

## 2021-08-03 NOTE — PROGRESS NOTES
"  PRIMARY CARE CENTER     Patient Name: Sarah العلي  YOB: 1950  MRN: [id number]    Date of Service: August 3, 2021  Chief Complaint: Review chest imaging         HISTORY OF PRESENT ILLNESS:    Ms. العلي is a 70 YO F with a h/o right internal carotid artery aneurysm, ocular migraine with aura, Raynaud's phenomenon, urinary retention, and GERD who presented to discuss prior chest imaging notable for atelectasis.    She was previously seen by Dr. Barbosa of pulmonology for the same complaints. This visit was in 10/2020. She has a relevant history of atypical pneumonia in the winter of 12/2020. She subsequently had a coronary calcium CT that showed LLL atelectasis vs scarring. She infrequently suffers from upper airway disease and uses an Albuterol inhaler prn. She is a retired pediatric RN, with a prior pos PPD in 1993 followed by a negative CXR. She has a FH of mother with scleroderma, but no known ILD. She frequently vacations in [place], no history of coccidio in chart review. No pets, birds, no hot tub exposures, no significant sandblasting or silica exposure or known asbestos exposure.She is a former smoker, quit >40 years ago. No prior PFT's.    Today she denies fevers, chills, night sweats, weight loss, hemoptysis, SUAZO, wheeze, cough, chest pain, palpitations, presyncope, or syncope. No recent illnesses. No recent pulmonary diagnoses or recent thoracic surgeries. No recent admissions.      Medications and allergies reviewed by me today.          PAST MEDICAL HISTORY:     Past Medical History:   Diagnosis Date     Age-related osteoporosis without current pathological fracture 2/22/2019     Benign positional vertigo decades     Bilateral hearing loss, unspecified hearing loss type 12/6/2018     CARDIOVASCULAR SCREENING; LDL GOAL LESS THAN 160 7/17/2013     Cerebral aneurysm 12/2017     Erythema multiforme 9/26/2016     GERD (gastroesophageal reflux disease)      Hearing problem '04 & \"18    SNHL " "mild L ear;moderate-severe R ear  >75% loss word compre     Hematuria 9/26/2016    Overview:  Has had urologic evaluation     Laryngospasm 9/26/2016     Lichen planus      Migraine     with auora     Migraines 2000    Ocular migraine/ Migraine with Aura     Ocular migraine 9/26/2016     Oral lichen planus 9/26/2016     Osteopenia 2010     Osteopenia of left hip 12/12/2018     Physical exam 9/26/2016    Overview:  Full code.  Would want her  and son to make medical decisions for her if she were unable to do so.  Does not have an advanced directive.     Overview:  Diet - tries to eat a healthy diet  Exercise - \"I'm a fair weather walker\"  Active during the day Mammogram - 10/2017  Colonoscopy - 2/2008 - next in 2018  DXA - 8/2015 - next in 2018  Immunizations -  Up to date      Right internal carotid artery aneurysm 12/6/2018    5 mm     Sensorineural hearing loss 2/04 & 4/18 2/18 worsened     Tinnitus 12/2018    R ear only     White coat syndrome without diagnosis of hypertension 12/6/2018            REVIEW OF SYSTEMS:     10 point ROS was negative except as noted in HPI         HOME MEDICATIONS:     Current Outpatient Medications   Medication     albuterol (PROAIR HFA/PROVENTIL HFA/VENTOLIN HFA) 108 (90 Base) MCG/ACT inhaler     Cholecalciferol (VITAMIN D3) 1000 UNITS CAPS     fluocinonide (LIDEX) 0.05 % external gel     methylcellulose (CITRUCEL) 500 MG TABS tablet     omeprazole (PRILOSEC) 10 MG DR capsule     No current facility-administered medications for this visit.          PHYSICAL EXAM:   Vitals: /78 (BP Location: Left arm, Patient Position: Sitting, Cuff Size: Adult Regular)   Pulse 75   Wt 56.2 kg (124 lb)   SpO2 100%   BMI 19.46 kg/m      Wt Readings from Last 4 Encounters:   08/03/21 56.2 kg (124 lb)   04/06/21 58.3 kg (128 lb 9.6 oz)   04/02/21 57.6 kg (127 lb)   03/24/21 58.3 kg (128 lb 9.6 oz)     GENERAL: pleasant adult woman, alert, interactive, in NAD  HEENT: Normocephalic, " atraumatic  LUNGS: clear to auscultation bilaterally, no crackles or wheezes, speaking in full sentences, on RA  HEART: regular rate and rhythm, normal S1 and S2, no murmur appreciated  EXTREMITIES: No LE edema bilaterally  SKIN: Warm and dry, no jaundice or acute rashes  NEURO: CN II-XII intact,  normal gait  PSYCH: A&O to person, place and time.appropriate mood, normal speech, linear thought.          DATA:     09/18/2020 Coronal noncontrast CT    No abnormally enlarged mediastinal lymph nodes. The visible solid organs in the upper abdomen are unremarkable. No acute osseous abnormality. Linear opacity in the left lower lobe corresponds to atelectasis or scar formation. No pulmonary masses.    08/28/20 CXR  Heart size is normal. Pulmonary vasculature is not  distended. Lungs are clear. No pleural fluid. No acute disease.          ASSESSMENT & PLAN:     Ms. العلي is a 72 YO F with a h/o right internal carotid artery aneurysm, ocular migraine with aura, Raynaud's phenomenon, urinary retenion, and GERD who presented to discuss prior chest imaging notable for possible atelectasis versus scarring. She remains asymptomatic and has already discussed these findings with pulmonary. We reviewed her prior imaging and prior pulmonary notes. I provided reassurance and education related to atelectasis. Similar to Dr. Barbosa's assessment, I am also not concerned about these findings, but would be happy to order repeat imaging, focused on her lungs, if she becomes symptomatic, but this is not needed at this time.    Patient care plan discussed with attending physician, Dr. Garcia, who agreed with above.    Kyle Lehman Jr, MD  Internal Medicine, PGY-3  [phone]

## 2021-08-03 NOTE — NURSING NOTE
Chief Complaint   Patient presents with     Results     pt here to discuss ct results from 7/20       Chloe Powers CMA, EMT at 2:39 PM on 8/3/2021.

## 2021-08-31 ENCOUNTER — VIRTUAL VISIT (OUTPATIENT)
Dept: INTERNAL MEDICINE | Facility: CLINIC | Age: 71
End: 2021-08-31
Payer: COMMERCIAL

## 2021-08-31 ENCOUNTER — LAB (OUTPATIENT)
Dept: LAB | Facility: CLINIC | Age: 71
End: 2021-08-31
Payer: COMMERCIAL

## 2021-08-31 DIAGNOSIS — R51.9 TEMPORAL PAIN: ICD-10-CM

## 2021-08-31 DIAGNOSIS — R51.9 TEMPORAL PAIN: Primary | ICD-10-CM

## 2021-08-31 LAB
BASOPHILS # BLD AUTO: 0 10E3/UL (ref 0–0.2)
BASOPHILS NFR BLD AUTO: 0 %
CRP SERPL-MCNC: <2.9 MG/L (ref 0–8)
EOSINOPHIL # BLD AUTO: 0.1 10E3/UL (ref 0–0.7)
EOSINOPHIL NFR BLD AUTO: 2 %
ERYTHROCYTE [DISTWIDTH] IN BLOOD BY AUTOMATED COUNT: 12.6 % (ref 10–15)
ERYTHROCYTE [SEDIMENTATION RATE] IN BLOOD BY WESTERGREN METHOD: 9 MM/HR (ref 0–30)
HCT VFR BLD AUTO: 37.7 % (ref 35–47)
HGB BLD-MCNC: 12.5 G/DL (ref 11.7–15.7)
LYMPHOCYTES # BLD AUTO: 1.6 10E3/UL (ref 0.8–5.3)
LYMPHOCYTES NFR BLD AUTO: 32 %
MCH RBC QN AUTO: 30.8 PG (ref 26.5–33)
MCHC RBC AUTO-ENTMCNC: 33.2 G/DL (ref 31.5–36.5)
MCV RBC AUTO: 93 FL (ref 78–100)
MONOCYTES # BLD AUTO: 0.5 10E3/UL (ref 0–1.3)
MONOCYTES NFR BLD AUTO: 10 %
NEUTROPHILS # BLD AUTO: 2.9 10E3/UL (ref 1.6–8.3)
NEUTROPHILS NFR BLD AUTO: 56 %
PLATELET # BLD AUTO: 294 10E3/UL (ref 150–450)
RBC # BLD AUTO: 4.06 10E6/UL (ref 3.8–5.2)
WBC # BLD AUTO: 5.2 10E3/UL (ref 4–11)

## 2021-08-31 PROCEDURE — 86140 C-REACTIVE PROTEIN: CPT

## 2021-08-31 PROCEDURE — 99441 PR PHYSICIAN TELEPHONE EVALUATION 5-10 MIN: CPT | Mod: 95 | Performed by: NURSE PRACTITIONER

## 2021-08-31 PROCEDURE — 36415 COLL VENOUS BLD VENIPUNCTURE: CPT

## 2021-08-31 PROCEDURE — 80048 BASIC METABOLIC PNL TOTAL CA: CPT

## 2021-08-31 PROCEDURE — 85652 RBC SED RATE AUTOMATED: CPT

## 2021-08-31 PROCEDURE — 85025 COMPLETE CBC W/AUTO DIFF WBC: CPT

## 2021-08-31 NOTE — PROGRESS NOTES
Magdiel is a 71 year old who is being evaluated via a billable telephone visit.      What phone number would you like to be contacted at? [phone]   How would you like to obtain your AVS? MyChart    Assessment & Plan     Temporal pain  Will check basic labs and screen for any elevation in CRP or ESR. Symptoms have been improving over the last 2 days, which is reassuring. Push fluids, rest, use heat/cold application, and Tylenol for pain if needed.  If inflammatory markers are elevated, will consult with Rheum for arterial biopsy if needed.   - CRP inflammation; Future  - Erythrocyte sedimentation rate; Future  - CBC with platelets differential; Future  - Basic metabolic panel; Future    Return if symptoms worsen or fail to improve.    KOFI Flores CNP  [facility] INTERNAL MEDICINE [facility]   Magdiel is a 71 year old who presents for the following health issues     HPI     Sunday AM, woke up and had headache symptoms, but when touched side of the head where pain was, was tender to the touch around temporal area. Took Tylenol, thinking it was a strange headache, and did not improve.  Continued to have tenderness to the touch, was concerned about possible shingles, never developed a rash in the area.  General malaise, mild nausea (2 days ago, now resolved), slight headache on that side of the head. Denies fevers. Tenderness has subsided each day since then.  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   No vision changes.   No injury that she can recall, no new activities.  Has not tried any other treatments.     Review of Systems   Constitutional, HEENT, cardiovascular, pulmonary, gi and gu systems are negative, except as otherwise noted.      Objective           Vitals:  No vitals were obtained today due to virtual visit.    Physical Exam   healthy, alert and no distress  PSYCH: Alert and oriented times 3;  coherent speech, normal   rate and volume, able to articulate logical thoughts, able   to abstract reason, no tangential thoughts, no hallucinations   or delusions  Her affect is normal and pleasant  RESP: No cough, no audible wheezing, able to talk in full sentences  Remainder of exam unable to be completed due to telephone visits        Phone call duration: 7 minutes

## 2021-08-31 NOTE — NURSING NOTE
Chief Complaint   Patient presents with     Pain     pain in head area since sunday, felt like a headache but when touching skin       ANDERSON Scherer at 12:09 PM on 8/31/2021.

## 2021-08-31 NOTE — PATIENT INSTRUCTIONS
[facility] Medication Refill Request Information:  * Please contact your pharmacy regarding ANY request for medication refills.  ** [facility] Prescription Fax = [phone]  * Please allow 3 business days for routine medication refills.  * Please allow 5 business days for controlled substance medication refills.     [facility] Test Result notification information:  *You will be notified with in 7-10 days of your appointment day regarding the results of your test.  If you are on MyChart you will be notified as soon as the provider has reviewed the results and signed off on them.    [facility]: [phone]

## 2021-09-01 LAB
ANION GAP SERPL CALCULATED.3IONS-SCNC: 5 MMOL/L (ref 3–14)
BUN SERPL-MCNC: 15 MG/DL (ref 7–30)
CALCIUM SERPL-MCNC: 9 MG/DL (ref 8.5–10.1)
CHLORIDE BLD-SCNC: 101 MMOL/L (ref 94–109)
CO2 SERPL-SCNC: 26 MMOL/L (ref 20–32)
CREAT SERPL-MCNC: 0.73 MG/DL (ref 0.52–1.04)
GFR SERPL CREATININE-BSD FRML MDRD: 83 ML/MIN/1.73M2
GLUCOSE BLD-MCNC: 81 MG/DL (ref 70–99)
POTASSIUM BLD-SCNC: 3.6 MMOL/L (ref 3.4–5.3)
SODIUM SERPL-SCNC: 132 MMOL/L (ref 133–144)

## 2021-09-03 ENCOUNTER — MYC MEDICAL ADVICE (OUTPATIENT)
Dept: INTERNAL MEDICINE | Facility: CLINIC | Age: 71
End: 2021-09-03

## 2021-09-03 ENCOUNTER — HOSPITAL ENCOUNTER (OUTPATIENT)
Dept: CARDIOLOGY | Facility: CLINIC | Age: 71
Discharge: HOME | End: 2021-09-03
Attending: INTERNAL MEDICINE | Admitting: INTERNAL MEDICINE
Payer: COMMERCIAL

## 2021-09-03 DIAGNOSIS — I27.20 PULMONARY HYPERTENSION (H): ICD-10-CM

## 2021-09-03 DIAGNOSIS — Z13.220 SCREENING FOR HYPERLIPIDEMIA: ICD-10-CM

## 2021-09-03 DIAGNOSIS — E78.5 HYPERLIPIDEMIA LDL GOAL <130: ICD-10-CM

## 2021-09-03 LAB — LVEF ECHO: NORMAL

## 2021-09-03 PROCEDURE — 93306 TTE W/DOPPLER COMPLETE: CPT | Mod: 26 | Performed by: INTERNAL MEDICINE

## 2021-09-03 PROCEDURE — 93306 TTE W/DOPPLER COMPLETE: CPT

## 2021-09-04 ENCOUNTER — HEALTH MAINTENANCE LETTER (OUTPATIENT)
Age: 71
End: 2021-09-04

## 2021-09-09 ENCOUNTER — MYC MEDICAL ADVICE (OUTPATIENT)
Dept: CARDIOLOGY | Facility: CLINIC | Age: 71
End: 2021-09-09

## 2021-09-09 DIAGNOSIS — I27.20 PULMONARY HTN (H): Primary | ICD-10-CM

## 2021-09-09 NOTE — TELEPHONE ENCOUNTER
Pt also asking for a referral, see additional [facility] message below:    Just need to add to previous message that I would need a referral to either facility. Also, if possible would like to see Dr. Perry @ [facility] program, unless there is another physician you would recommend.      Thanks again.

## 2021-09-09 NOTE — TELEPHONE ENCOUNTER
Updated patient with Dr. Sotelo's response. Sheree Mar RN on 9/9/2021 at 2:40 PM      Asher Sotelo MD  You Just now (2:27 PM)   KV     Not an urgency. Not a significant change. Within acceptable limits.          Documentation

## 2021-09-09 NOTE — TELEPHONE ENCOUNTER
Patient sent Jaco Solarsit message to Dr. Sotelo. Below is a summary of RV pressures on this year's TTE compared to last year's exercise stress echo that patient is comparing.Sheree Mar RN on 9/9/2021 at 12:56 P      Subject Delivery           Question about a test result  9/9/2021 8:36 AM Reply    To: [facility]      From: Magdiel GO العلي      Created: 9/9/2021 8:36 AM        *-*-*This message has not been handled.*-*-*    Sheree WOLFE RN:  Thank you for reviewing my echo from 09/03/21. I am unsure whether the right ventricular systolic pressure  increase from 21mm/Hg to 35mm/Hg represents any urgency .     Thank you,   Magdiel            9/3/21 Resting TTE    Tricuspid Valve  The right ventricular systolic pressure is approximated at 35mmHg plus the  right atrial pressure. There is mild to moderate (1-2+) tricuspid  regurgitation.      9/1/2020 Exercise Stress Echo    Tricuspid Valve  There is mild tricuspid regurgitation. PA systolic pressure at baseline 21  mmHg + RA pressure increased to 43 mmHg + RA pressure with 2 minutes of  exercise.     Right Ventricle  The right ventricular systolic function is normal.

## 2021-10-01 ENCOUNTER — TELEPHONE (OUTPATIENT)
Dept: NURSING | Facility: CLINIC | Age: 71
End: 2021-10-01

## 2021-10-01 NOTE — TELEPHONE ENCOUNTER
".  [facility]: Nurse Triage Note  SITUATION/BACKGROUND                                                      Transferred from Call Devyn العلي is a 71 year old female who calls to report \"having history of aneurysm and blocked internal carotid and followed by [facility]\".   Patient reports, \"experiencing a sharp pain on the right carotid region of head\" for the past 3 days. Pain is intermittent every 1-2 minutes and last about 5 to 6 seconds and goes away. Rates pain at 4-/10.   Denies any other symptoms at this time.   Patient has a virtual visit today at 2:30 with Dr. Gonzalez in Primary Care.   Routed to Primary Care as High Priority to review and follow up call to patient at [phone] for further assistance or instruction if needed.     RECOMMENDATION/PLAN                                                      RECOMMENDED DISPOSITION:  Seek ED care sudden severe pain ; blurred vision; sudden weakness, unsteady gait/ numbness or tingling on one side of body; change in ability to walk.   Will comply with recommendation: Yes    If further questions/concerns or if symptoms do not improve, worsen or new symptoms develop, call your PCP or [phone] to talk with the Resident on call, as soon as possible.    Guideline used: headache  Telephone Triage Protocols for Nurses, Fifth Edition, Alise Butler RN, RN  "

## 2021-10-17 ASSESSMENT — ENCOUNTER SYMPTOMS
DYSPNEA ON EXERTION: 1
NAIL CHANGES: 0
SPUTUM PRODUCTION: 0
SYNCOPE: 0
HEMATURIA: 0
HYPOTENSION: 0
SNORES LOUDLY: 0
COUGH DISTURBING SLEEP: 0
WHEEZING: 0
SINUS PAIN: 0
TASTE DISTURBANCE: 0
SHORTNESS OF BREATH: 0
EXERCISE INTOLERANCE: 0
BLOOD IN STOOL: 0
MEMORY LOSS: 0
SORE THROAT: 0
LOSS OF CONSCIOUSNESS: 0
DISTURBANCES IN COORDINATION: 0
VOMITING: 0
ORTHOPNEA: 0
NUMBNESS: 0
SMELL DISTURBANCE: 0
TROUBLE SWALLOWING: 1
DYSURIA: 0
COUGH: 0
HEADACHES: 1
SKIN CHANGES: 0
WEAKNESS: 0
NAUSEA: 0
TREMORS: 0
HOARSE VOICE: 0
SEIZURES: 0
TINGLING: 0
ABDOMINAL PAIN: 0
DIZZINESS: 0
LIGHT-HEADEDNESS: 1
POSTURAL DYSPNEA: 0
PALPITATIONS: 1
SPEECH CHANGE: 0
NECK MASS: 0
BOWEL INCONTINENCE: 0
SLEEP DISTURBANCES DUE TO BREATHING: 0
POOR WOUND HEALING: 0
CONSTIPATION: 0
LEG PAIN: 0
JAUNDICE: 0
PARALYSIS: 0
HEMOPTYSIS: 0
DIARRHEA: 0
SINUS CONGESTION: 0
BLOATING: 0
DIFFICULTY URINATING: 0
FLANK PAIN: 0
HEARTBURN: 1
HYPERTENSION: 0

## 2021-10-25 ENCOUNTER — OFFICE VISIT (OUTPATIENT)
Dept: CARDIOLOGY | Facility: CLINIC | Age: 71
End: 2021-10-25
Attending: INTERNAL MEDICINE
Payer: COMMERCIAL

## 2021-10-25 VITALS
HEIGHT: 68 IN | OXYGEN SATURATION: 100 % | DIASTOLIC BLOOD PRESSURE: 73 MMHG | HEART RATE: 73 BPM | SYSTOLIC BLOOD PRESSURE: 129 MMHG | WEIGHT: 126.3 LBS | BODY MASS INDEX: 19.14 KG/M2

## 2021-10-25 DIAGNOSIS — R06.02 SOB (SHORTNESS OF BREATH): Primary | ICD-10-CM

## 2021-10-25 DIAGNOSIS — I27.20 PULMONARY HTN (H): ICD-10-CM

## 2021-10-25 DIAGNOSIS — R06.02 SOB (SHORTNESS OF BREATH): ICD-10-CM

## 2021-10-25 PROCEDURE — 93242 EXT ECG>48HR<7D RECORDING: CPT

## 2021-10-25 PROCEDURE — 99215 OFFICE O/P EST HI 40 MIN: CPT | Mod: 25 | Performed by: INTERNAL MEDICINE

## 2021-10-25 PROCEDURE — G0463 HOSPITAL OUTPT CLINIC VISIT: HCPCS

## 2021-10-25 PROCEDURE — 93244 EXT ECG>48HR<7D REV&INTERPJ: CPT | Performed by: INTERNAL MEDICINE

## 2021-10-25 ASSESSMENT — PAIN SCALES - GENERAL: PAINLEVEL: NO PAIN (0)

## 2021-10-25 ASSESSMENT — MIFFLIN-ST. JEOR: SCORE: 1133.14

## 2021-10-25 NOTE — NURSING NOTE
Chief Complaint   Patient presents with     New Patient     New PH referral from Dr. Maryellen Quans were taken and medications reconciled.    Gurdeep Joy, EMT  8:14 AM

## 2021-10-25 NOTE — PROGRESS NOTES
Per Negrita Goodman , patient to have 7 day Zio patch monitor placed.  Diagnosis: Pulmonary HTN I27.20  Monitor placed: Yes  Patient Instructed: Yes  Patient verbalized understanding: Yes  Holter # [id number]    Placed by Chris Bah

## 2021-10-25 NOTE — PROGRESS NOTES
"Service Date: 2021    Asher Sotelo MD  [facility]  [address]  [place] [address]    Kelley Hernandez MD  [facility]  [facility] [address]    RE:  Sarah العلي   MRN:  [id number]  :  1950      Dear Bandar Sotelo and Cachorro Hernandez:    We had the pleasure of seeing Sarah العلي at the [facility]. As you know, Ms. العلي is a very pleasant 71 year old with a history of carotid artery aneurysm, GERD, and Raynaud's disease without a history of ulcers who is establishing care with us for evaluation of pulmonary hypertension.    She notes that she was in her usual state of health until 2019 when she was diagnosed with \"walking pneumonia\" after traveling. She then started noticing gradual worsening dyspnea on exertion. She has dyspnea after ascending 3 flights of stairs, which she had no issues doing prior to getting sick in 2019. She is wondering whether she had COVID-19, but at the time, there were no diagnostic tests readily available for it. In 2020, she woke up suddenly in the morning with mid-chest discomfort that lasted for 15 minutes. She went to the ED and she had a negative troponin I, D-dimer, and benign EKG. She subsequently had an outpatient exercise stress echocardiogram where she exercised for 2 minutes and 12 seconds and stopped due to fatigue, met target heart rate, normal BP response, MVO2 20.8 ml/kg/min (88% predicted), and had no inducible ischemia with LVEF 55-60%. PASP at rest was 21 mmHg + RA and exercised to 43 mmHg + RA after 2 minutes of exercise. She then followed up with Dr. Sotelo who thought that she may have mild exercise-induced PH and recommended a repeat echocardiogram in 1 year and CT calcium screening was obtained, which showed a calcium score of 0. She had a repeat echocardiogram last month that showed LVEF 60%, normal RV size and function, and RVSP 35 mmHg + RA " "at rest.    She walks for 30 minutes 3-4 times per week. We would categorize her as NYHA functional class II. She has intermittent episodes of minor chest pain that mostly occur at rest, last episode a couple weeks ago where she again woke up in the middle of the night with chest discomfort which eventually improved after taking antiacids. She used to seldomly take prilosec but is now taking it more frequently. She has chronic palpitations for years. Has lightheadedness when standing for prolonged periods of time. No syncopal events. No lower extremity edema. Has been having decreased stamina over the last year. Denies prior diagnosed cardiac or pulmonary disease, liver disease, history of DVT/PE, or use of anorexigenics. Mother had scleroderma. Patient was previously a pediatric RN.      PAST MEDICAL HISTORY:  Past Medical History:   Diagnosis Date     Age-related osteoporosis without current pathological fracture 2/22/2019     Benign positional vertigo decades     Bilateral hearing loss, unspecified hearing loss type 12/6/2018     CARDIOVASCULAR SCREENING; LDL GOAL LESS THAN 160 7/17/2013     Cerebral aneurysm 12/2017     Erythema multiforme 9/26/2016     GERD (gastroesophageal reflux disease)      Hearing problem '04 & \"18    SNHL mild L ear;moderate-severe R ear  >75% loss word compre     Hematuria 9/26/2016    Overview:  Has had urologic evaluation     Laryngospasm 9/26/2016     Lichen planus      Migraine     with auora     Migraines 2000    Ocular migraine/ Migraine with Aura     Ocular migraine 9/26/2016     Oral lichen planus 9/26/2016     Osteopenia 2010     Osteopenia of left hip 12/12/2018     Physical exam 9/26/2016    Overview:  Full code.  Would want her  and son to make medical decisions for her if she were unable to do so.  Does not have an advanced directive.     Overview:  Diet - tries to eat a healthy diet  Exercise - \"I'm a fair weather walker\"  Active during the day Mammogram - 10/2017  " Colonoscopy - 2008 - next in   DXA - 2015 - next in   Immunizations -  Up to date      Right internal carotid artery aneurysm 2018    5 mm     Sensorineural hearing loss  &  worsened     Tinnitus 2018    R ear only     White coat syndrome without diagnosis of hypertension 2018       PAST SURGICAL HISTORY:  Past Surgical History:   Procedure Laterality Date     DILATION AND CURETTAGE       LAPAROSCOPY       TONSILLECTOMY       TONSILLECTOMY & ADENOIDECTOMY         FAMILY HISTORY:  Family History   Problem Relation Age of Onset     Cerebrovascular Disease Father          CVA      Other - See Comments Mother          58 scleroderma     Heart Failure Maternal Grandfather      Heart Disease Maternal Grandfather         CHF     Diabetes Maternal Grandmother         Type 1     Cancer Paternal Grandmother         Endometrial CA of uterus     Breast Cancer Paternal Aunt      Diabetes Cousin         Type 1     Glaucoma No family hx of      Macular Degeneration No family hx of        SOCIAL HISTORY:  Social History     Socioeconomic History     Marital status:      Spouse name: Not on file     Number of children: Not on file     Years of education: Not on file     Highest education level: Not on file   Occupational History     Occupation: RN     Comment: [employer]   Tobacco Use     Smoking status: Former Smoker     Packs/day: 0.50     Years: 12.00     Pack years: 6.00     Types: Cigarettes     Start date: 1968     Quit date: 1980     Years since quittin.8     Smokeless tobacco: Never Used   Substance and Sexual Activity     Alcohol use: Not Currently     Alcohol/week: 1.0 - 2.0 standard drinks     Comment: Minimal 1 glass wine approx. 2x/month     Drug use: No     Sexual activity: Yes     Partners: Male   Other Topics Concern     Parent/sibling w/ CABG, MI or angioplasty before 65F 55M? 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 3 x's    How much calcium per day? diet       How much caffeine per day? 2-3 cups    How much vitamin D per day? 2000 iu    Do you/your family wear seatbelts?  Yes    Do you/your family use safety helmets? n/a    Do you/your family use sunscreen? Yes    Do you/your family keep firearms in the home? No    Do you/your family have a smoke detector(s)? Yes        Do you feel safe in your home? Yes    Has anyone ever touched you in an unwanted manner? No     Explain         August 4, 2015 Alina Grant LPN             Social Determinants of Health     Financial Resource Strain:      Difficulty of Paying Living Expenses:    Food Insecurity:      Worried About Running Out of Food in the Last Year:      Ran Out of Food in the Last Year:    Transportation Needs:      Lack of Transportation (Medical):      Lack of Transportation (Non-Medical):    Physical Activity:      Days of Exercise per Week:      Minutes of Exercise per Session:    Stress:      Feeling of Stress :    Social Connections:      Frequency of Communication with Friends and Family:      Frequency of Social Gatherings with Friends and Family:      Attends Zoroastrianism Services:      Active Member of Clubs or Organizations:      Attends Club or Organization Meetings:      Marital Status:    Intimate Partner Violence:      Fear of Current or Ex-Partner:      Emotionally Abused:      Physically Abused:      Sexually Abused:        CURRENT MEDICATIONS:  Current Outpatient Medications   Medication Sig     albuterol (PROAIR HFA/PROVENTIL HFA/VENTOLIN HFA) 108 (90 Base) MCG/ACT inhaler Inhale 2 puffs into the lungs as needed     Cholecalciferol (VITAMIN D3) 1000 UNITS CAPS Take 1 capsule by mouth daily     fluocinonide (LIDEX) 0.05 % external gel Apply topically 2 times daily     methylcellulose (CITRUCEL) 500 MG TABS tablet   "    omeprazole (PRILOSEC) 10 MG DR capsule Take 20 mg by mouth as needed     No current facility-administered medications for this visit.       ROS:   10 point ROS negative except as discussed in above HPI.    EXAM:  /73 (BP Location: Right arm, Patient Position: Chair, Cuff Size: Adult Regular)   Pulse 73   Ht 1.722 m (5' 7.8\")   Wt 57.3 kg (126 lb 4.8 oz)   SpO2 100%   BMI 19.32 kg/m    General: appears comfortable, alert and articulate  Head: normocephalic, atraumatic  Eyes: anicteric sclera, EOMI  Neck: no adenopathy  Orophyarynx: moist mucosa, no lesions, dentition intact  Heart: occasional irregular rhythm, normal S1, loud P2, no murmur, gallop, rub, estimated JVP 6 cm, 2+ radial and carotid pulses  Lungs: clear to auscultation bilaterally, no rales or wheezing  Abdomen: soft, non-tender, bowel sounds present, no hepatosplenomegaly  Extremities: no clubbing, cyanosis or edema. No visible telangectasias.   Neurological: normal speech and affect, no gross motor deficits      EKG 8/28/20:  Normal sinus rhythm, normal axis    Echocardiogram 9/3/21:  1. The left ventricle is normal in structure, function and size. The visual  ejection fraction is estimated at 60%.  2. The right ventricle is normal in structure, function and size.  3. There is mild to moderate (1-2+) tricuspid regurgitation. The right  ventricular systolic pressure is approximated at 35mmHg plus the right atrial  pressure.  4. There is mild to moderate (1-2+) aortic regurgitation.     Stress echo from 9/2020 showed EF 55%, 1+ AI, 1+ TR, RVSP 43mmHg.    CT coronary calcium 9/18/20:  CORONARY ARTERY CALCIUM SCORES:      Left main coronary artery: 0  Left anterior descending coronary artery: 0  Circumflex coronary artery: 0   Right coronary artery: 0     TOTAL CALCIUM SCORE: 0     The total Agatston calcium score is 0.     Exercise stress echo 9/1/20:  Exercise stress echocardiogram with no inducible ischemia in the setting of  reduced " exercise tolerance.     Target heart rate achieved. Normal blood pressure response to exercise.  Test stopped due to fatigue.  No angina symptoms with exercise.  No ECG evidence of ischemia.  Normal segmental and global LV function with EF of approximately 55-60% at  rest; with exercise left ventricular cavity size and LVEF did not change  significantly.  No stress induced regional wall motion abnormalities.  Reduced functional capacity for age.     Normal aortic root and no significant valvular dysfunction noted on screening  2D and Doppler examination.      Assessment and Plan:     Sarah العلي is a very pleasant 71 year old with a history of carotid artery aneurysm, GERD, and Raynaud's disease without a history of ulcers who is establishing care with us for evaluation of pulmonary hypertension.    She is NYHA functional class II. Her most recent echocardiogram showed RVSP 35 mmHg which could be either an over or underestimation. She may have Group 1 PAH in the setting of Raynaud's and given her mother's past medical history of scleroderma or Group 2 PH due to diastolic dysfunction. We will complete a comprehensive evaluation for pulmonary hypertension and obtain our PH serologic labs, right heart catheterization with exercise if mean PA pressure not elevated at rest or with vasodilator study if mean PA pressure is elevated at rest, PFTs, CPX, and CT chest with dual beam to evaluate for chronic thromboembolic disease. We will also obtain a 7 day Zio patch since she has been having intermittent palpitations.    Follow-up after completion of above evaluation.    It was a pleasure seeing Sarah العلي at the [facility]. Please contact us with any questions or concerns that you may have.      Patient was seen and discussed with staff attending, Dr. Rees.      Sincerely,      Ruthann Cruz MD, PhD  Cardiology Fellow    I examined the patient and agree with the  assessment and plan of Dr. Cruz    Total time today was 65 minutes reviewing notes, imaging, labs, patient visit, orders and documentation         Negrita Rees MD   [facility]  Heart Failure, Transplant, and Mechanical Circulatory Support Cardiology   Cardiovascular Division  [facility]   [phone]

## 2021-10-25 NOTE — LETTER
"10/25/2021      RE: Sarah العلي  [address]  [place] [address]       Dear Colleague,    Thank you for the opportunity to participate in the care of your patient, Sarah العلي, at the [facility] [place] at [facility]. Please see a copy of my visit note below.    Service Date: 2021    Asher Sotelo MD  [facility]  [address]  [place] [address]    Kelley Hernandez MD  [facility]  [facility] [address]    RE:  Sarah العلي   MRN:  [id number]  :  1950      Dear Bandar Sotelo and Cachorro Hernandez:    We had the pleasure of seeing Sarah العلي at the [facility] Pulmonary [facility]pertension [facility]. As you know, Ms. العلي is a very pleasant 71 year old with a history of carotid artery aneurysm, GERD, and Raynaud's disease without a history of ulcers who is establishing care with us for evaluation of pulmonary hypertension.    She notes that she was in her usual state of health until 2019 when she was diagnosed with \"walking pneumonia\" after traveling. She then started noticing gradual worsening dyspnea on exertion. She has dyspnea after ascending 3 flights of stairs, which she had no issues doing prior to getting sick in 2019. She is wondering whether she had COVID-19, but at the time, there were no diagnostic tests readily available for it. In 2020, she woke up suddenly in the morning with mid-chest discomfort that lasted for 15 minutes. She went to the ED and she had a negative troponin I, D-dimer, and benign EKG. She subsequently had an outpatient exercise stress echocardiogram where she exercised for 2 minutes and 12 seconds and stopped due to fatigue, met target heart rate, normal BP response, MVO2 20.8 ml/kg/min (88% predicted), and had no inducible ischemia with LVEF 55-60%. PASP at rest was 21 mmHg + RA and exercised to 43 mmHg + RA after 2 " "minutes of exercise. She then followed up with Dr. Sotelo who thought that she may have mild exercise-induced PH and recommended a repeat echocardiogram in 1 year and CT calcium screening was obtained, which showed a calcium score of 0. She had a repeat echocardiogram last month that showed LVEF 60%, normal RV size and function, and RVSP 35 mmHg + RA at rest.    She walks for 30 minutes 3-4 times per week. We would categorize her as NYHA functional class II. She has intermittent episodes of minor chest pain that mostly occur at rest, last episode a couple weeks ago where she again woke up in the middle of the night with chest discomfort which eventually improved after taking antiacids. She used to seldomly take prilosec but is now taking it more frequently. She has chronic palpitations for years. Has lightheadedness when standing for prolonged periods of time. No syncopal events. No lower extremity edema. Has been having decreased stamina over the last year. Denies prior diagnosed cardiac or pulmonary disease, liver disease, history of DVT/PE, or use of anorexigenics. Mother had scleroderma. Patient was previously a pediatric RN.      PAST MEDICAL HISTORY:  Past Medical History:   Diagnosis Date     Age-related osteoporosis without current pathological fracture 2/22/2019     Benign positional vertigo decades     Bilateral hearing loss, unspecified hearing loss type 12/6/2018     CARDIOVASCULAR SCREENING; LDL GOAL LESS THAN 160 7/17/2013     Cerebral aneurysm 12/2017     Erythema multiforme 9/26/2016     GERD (gastroesophageal reflux disease)      Hearing problem '04 & \"18    SNHL mild L ear;moderate-severe R ear  >75% loss word compre     Hematuria 9/26/2016    Overview:  Has had urologic evaluation     Laryngospasm 9/26/2016     Lichen planus      Migraine     with auora     Migraines 2000    Ocular migraine/ Migraine with Aura     Ocular migraine 9/26/2016     Oral lichen planus 9/26/2016     Osteopenia 2010     " "Osteopenia of left hip 2018     Physical exam 2016    Overview:  Full code.  Would want her  and son to make medical decisions for her if she were unable to do so.  Does not have an advanced directive.     Overview:  Diet - tries to eat a healthy diet  Exercise - \"I'm a fair weather walker\"  Active during the day Mammogram - 10/2017  Colonoscopy - 2008 - next in   DXA - 2015 - next in   Immunizations -  Up to date      Right internal carotid artery aneurysm 2018    5 mm     Sensorineural hearing loss  &  worsened     Tinnitus 2018    R ear only     White coat syndrome without diagnosis of hypertension 2018       PAST SURGICAL HISTORY:  Past Surgical History:   Procedure Laterality Date     DILATION AND CURETTAGE       LAPAROSCOPY       TONSILLECTOMY       TONSILLECTOMY & ADENOIDECTOMY         FAMILY HISTORY:  Family History   Problem Relation Age of Onset     Cerebrovascular Disease Father          CVA 2006     Other - See Comments Mother          58 scleroderma     Heart Failure Maternal Grandfather      Heart Disease Maternal Grandfather         CHF     Diabetes Maternal Grandmother         Type 1     Cancer Paternal Grandmother         Endometrial CA of uterus     Breast Cancer Paternal Aunt      Diabetes Cousin         Type 1     Glaucoma No family hx of      Macular Degeneration No family hx of        SOCIAL HISTORY:  Social History     Socioeconomic History     Marital status:      Spouse name: Not on file     Number of children: Not on file     Years of education: Not on file     Highest education level: Not on file   Occupational History     Occupation: RN     Comment: Home [employer] Services   Tobacco Use     Smoking status: Former Smoker     Packs/day: 0.50     Years: 12.00     Pack years: 6.00     Types: Cigarettes     Start date: 1968     Quit date: 1980     Years since quittin.8     Smokeless tobacco: Never Used "   Substance and Sexual Activity     Alcohol use: Not Currently     Alcohol/week: 1.0 - 2.0 standard drinks     Comment: Minimal 1 glass wine approx. 2x/month     Drug use: No     Sexual activity: Yes     Partners: Male   Other Topics Concern     Parent/sibling w/ CABG, MI or angioplasty before 65F 55M? 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 3 x's    How much calcium per day? diet       How much caffeine per day? 2-3 cups    How much vitamin D per day? 2000 iu    Do you/your family wear seatbelts?  Yes    Do you/your family use safety helmets? n/a    Do you/your family use sunscreen? Yes    Do you/your family keep firearms in the home? No    Do you/your family have a smoke detector(s)? Yes        Do you feel safe in your home? Yes    Has anyone ever touched you in an unwanted manner? No     Explain         August 4, 2015 Alina Grant LPN             Social Determinants of Health     Financial Resource Strain:      Difficulty of Paying Living Expenses:    Food Insecurity:      Worried About Running Out of Food in the Last Year:      Ran Out of Food in the Last Year:    Transportation Needs:      Lack of Transportation (Medical):      Lack of Transportation (Non-Medical):    Physical Activity:      Days of Exercise per Week:      Minutes of Exercise per Session:    Stress:      Feeling of Stress :    Social Connections:      Frequency of Communication with Friends and Family:      Frequency of Social Gatherings with Friends and Family:      Attends Denominational Services:      Active Member of Clubs or Organizations:      Attends Club or Organization Meetings:      Marital Status:    Intimate Partner Violence:      Fear of Current or Ex-Partner:      Emotionally Abused:      Physically Abused:      Sexually Abused:        CURRENT MEDICATIONS:  Current Outpatient  "Medications   Medication Sig     albuterol (PROAIR HFA/PROVENTIL HFA/VENTOLIN HFA) 108 (90 Base) MCG/ACT inhaler Inhale 2 puffs into the lungs as needed     Cholecalciferol (VITAMIN D3) 1000 UNITS CAPS Take 1 capsule by mouth daily     fluocinonide (LIDEX) 0.05 % external gel Apply topically 2 times daily     methylcellulose (CITRUCEL) 500 MG TABS tablet      omeprazole (PRILOSEC) 10 MG DR capsule Take 20 mg by mouth as needed     No current facility-administered medications for this visit.       ROS:   10 point ROS negative except as discussed in above HPI.    EXAM:  /73 (BP Location: Right arm, Patient Position: Chair, Cuff Size: Adult Regular)   Pulse 73   Ht 1.722 m (5' 7.8\")   Wt 57.3 kg (126 lb 4.8 oz)   SpO2 100%   BMI 19.32 kg/m    General: appears comfortable, alert and articulate  Head: normocephalic, atraumatic  Eyes: anicteric sclera, EOMI  Neck: no adenopathy  Orophyarynx: moist mucosa, no lesions, dentition intact  Heart: occasional irregular rhythm, normal S1, loud P2, no murmur, gallop, rub, estimated JVP 6 cm, 2+ radial and carotid pulses  Lungs: clear to auscultation bilaterally, no rales or wheezing  Abdomen: soft, non-tender, bowel sounds present, no hepatosplenomegaly  Extremities: no clubbing, cyanosis or edema. No visible telangectasias.   Neurological: normal speech and affect, no gross motor deficits      EKG 8/28/20:  Normal sinus rhythm, normal axis    Echocardiogram 9/3/21:  1. The left ventricle is normal in structure, function and size. The visual  ejection fraction is estimated at 60%.  2. The right ventricle is normal in structure, function and size.  3. There is mild to moderate (1-2+) tricuspid regurgitation. The right  ventricular systolic pressure is approximated at 35mmHg plus the right atrial  pressure.  4. There is mild to moderate (1-2+) aortic regurgitation.     Stress echo from 9/2020 showed EF 55%, 1+ AI, 1+ TR, RVSP 43mmHg.    CT coronary calcium " 9/18/20:  CORONARY ARTERY CALCIUM SCORES:      Left main coronary artery: 0  Left anterior descending coronary artery: 0  Circumflex coronary artery: 0   Right coronary artery: 0     TOTAL CALCIUM SCORE: 0     The total Agatston calcium score is 0.     Exercise stress echo 9/1/20:  Exercise stress echocardiogram with no inducible ischemia in the setting of  reduced exercise tolerance.     Target heart rate achieved. Normal blood pressure response to exercise.  Test stopped due to fatigue.  No angina symptoms with exercise.  No ECG evidence of ischemia.  Normal segmental and global LV function with EF of approximately 55-60% at  rest; with exercise left ventricular cavity size and LVEF did not change  significantly.  No stress induced regional wall motion abnormalities.  Reduced functional capacity for age.     Normal aortic root and no significant valvular dysfunction noted on screening  2D and Doppler examination.      Assessment and Plan:     Sarah العلي is a very pleasant 71 year old with a history of carotid artery aneurysm, GERD, and Raynaud's disease without a history of ulcers who is establishing care with us for evaluation of pulmonary hypertension.    She is NYHA functional class II. Her most recent echocardiogram showed RVSP 35 mmHg which could be either an over or underestimation. She may have Group 1 PAH in the setting of Raynaud's and given her mother's past medical history of scleroderma or Group 2 PH due to diastolic dysfunction. We will complete a comprehensive evaluation for pulmonary hypertension and obtain our PH serologic labs, right heart catheterization with exercise if mean PA pressure not elevated at rest or with vasodilator study if mean PA pressure is elevated at rest, PFTs, CPX, and CT chest with dual beam to evaluate for chronic thromboembolic disease. We will also obtain a 7 day Zio patch since she has been having intermittent palpitations.    Follow-up after completion of above  evaluation.    It was a pleasure seeing Sarah العلي at the [facility]. Please contact us with any questions or concerns that you may have.      Patient was seen and discussed with staff attending, Dr. Rees.      Sincerely,      Ruthann Cruz MD, PhD  Cardiology Fellow    I examined the patient and agree with the assessment and plan of Dr. Cruz    Total time today was 65 minutes reviewing notes, imaging, labs, patient visit, orders and documentation         Negrita Rees MD   Center for Pulmonary Hypertension  Heart Failure, Transplant, and Mechanical Circulatory Support Cardiology   Cardiovascular Division  [facility]   [phone]

## 2021-10-25 NOTE — NURSING NOTE
Procedures and/or Testing: Patient given instructions regarding  Cardio/pulmonary exercise stress test, Pulmonary Function Test, Chest CT,. Discussed purpose, preparation, procedure and when to expect results reported back to the patient. Patient demonstrated understanding of this information and agreed to call with further questions or concerns.  Right Heart Catheterization: Patient was instructed regarding right heart catheterization, including discussion of the procedure, preparation, intra-procedural steps, and recovery at home. Patient demonstrated understanding of this information and agreed to call with further questions or concerns.  Diet: Patient instructed regarding a heart healthy diet, including discussion of reduced fat and sodium intake. Patient demonstrated understanding of this information and agreed to call with further questions or concerns.  Return Appointment: Patient given instructions regarding scheduling next clinic visit. Patient demonstrated understanding of this information and agreed to call with further questions or concerns.  Patient stated she understood all health information given and agreed to call with further questions or concerns.    Staff message sent to Courtney to help schedule testing. Enedina Ren RN on 10/25/2021 at 9:43 AM

## 2021-10-25 NOTE — PATIENT INSTRUCTIONS
Medication Changes:  - No medication changes at this time. Please continue current medication regiment.    Patient Instructions:  1. Continue staying active and eat a heart healthy diet.    2. Please keep current list of medications with you at all times.    3. Remember to weigh yourself daily after voiding and before you consume any food or beverages and log the numbers.  If you have gained 2 pounds overnight or 5 pounds in a week contact us immediately for medication adjustments or further instructions.    4. **Please call us immediately if you have any syncope (fainting or passing out), chest pain, edema (swelling or weight gain), or decline in your functional status (general decline in how you are feeling).    5. Patients on Remodulin (treprostinil) or Veletri (epoprostenol): Please make sure that you have your backup pump and supplies with you at all times, your mixing instructions, and contact information for your specialty pharmacy.    Follow up Appointment Information:  - Zio patch today before you leave   - Cardiopulmonary stress test, pulmonary function test, chest CT, Right heart catheterization with Dr. Rees. Berta will call you to schedule.  - Follow up with Dr. Rees in clinic after testing to review results    Check-In  Time Check-In Location Estimated Length Procedure   Name        [facility]  waiting room 60-90 minutes Cardio Pulmonary Stress Test**     Procedure Preparations & Instructions     This is a non-invasive procedure that DOES require preparation:    - Nothing to eat for 6 hours prior to your test  - You may have clear liquids up to the time of your test  - DO NOT use alcohol, tobacco or food/beverages containing caffeine for at least 12 hours prior to your test (ie. Coffee, tea, soda, chocolate, de-caffeinated beverages, certain medications including Excedrin, Anacin, NoDoz)  - Please wear loose, two-piece clothing and comfortable, rubber soled shoes for walking       Check-In  Time  Check-In Location Estimated Length Procedure   Name        [facility]   3rd Floor 60 minutes Pulmonary Function Test**   Procedure Preparations & Instructions     This is a non-invasive procedure and does NOT require any preparation         Check-In  Time Check-In Location Estimated Length Procedure   Name         20 minutes CT Scan**   Procedure Preparations & Instructions     This is a non-invasive procedure and does NOT require any preparation         Check-In  Time Check-In Location Estimated Length Procedure   Name        [facility]  waiting room 60-90 minutes Right Heart Catheterization**     Procedure Preparations & Instructions     This is an invasive procedure that DOES require preparation:    - Nothing to eat for 6 hours   - You may have clear liquids up until the time of your procedure  - You can take your morning medications (except diabetic and blood thinners) with sips of water  - Please arrange for a ride to drop you off and pick you up in the instance you are unable to drive home, however you should be able to function as you normally would after the procedure       *Mandatory COVID Testing for RHC:   Pt will need to complete a COVID test no sooner than 4 days prior to their procedure.      To schedule COVID testing Please call [phone]    If you want to complete this at an outside facility please call them to find out if they will have the results within the appropriate time frame and their fax number.  You will need to provide us with that information so we can send them the order.    They will need to fax the results to [phone]    If you are running into and issues please call us.       We are located on the third floor of the Clinic and Surgery Center ([facility]) on the [facility].  Our address is     [address] [place] [address] on [address]rd [place] [address]    Thank you for allowing us to be a part of your care here at the [facility]    If you  have questions or concerns please contact us at:    Kelley Monterroso, RN, BSN, PHN  Berta Orellana (Scheduling,Prior Auth)  Nurse Coordinator     Clinic   Pulmonary Hypertension   Pulmonary Hypertension  [facility]  (Phone)[phone]   (Phone) [phone]        (Fax) [phone]    ** Please note that you will NOT receive a reminder call regarding your scheduled testing, reminder calls are for provider appointments only.  If you are scheduled for testing within the [facility] system you may receive a call regarding pre-registration for billing purposes only.**     Support Group:  Pulmonary Hypertension Association  Https://www.phassociation.org/  **Look at the Events Tab** They even have Support Groups that you can call into    [facility] Support Group  Second Saturday of the Month from 1-3 PM   Location: [address] [place] [address]  Leader: Marianna Ruano and Sylvia Milton  Phone: [phone] or [phone]  Email: [email or web address]

## 2021-10-26 RX ORDER — LIDOCAINE 40 MG/G
CREAM TOPICAL
Status: CANCELLED | OUTPATIENT
Start: 2021-10-26

## 2021-10-28 DIAGNOSIS — Z11.59 ENCOUNTER FOR SCREENING FOR OTHER VIRAL DISEASES: ICD-10-CM

## 2021-11-01 ENCOUNTER — TELEPHONE (OUTPATIENT)
Dept: CARDIOLOGY | Facility: CLINIC | Age: 71
End: 2021-11-01

## 2021-11-01 NOTE — TELEPHONE ENCOUNTER
----- Message from Kelley Monterroso RN sent at 10/28/2021  3:46 PM CDT -----  Regarding: pre-procedure education  Call patient with pre-procedure education including covid testing.  ----- Message -----  From: Berta Orellana  Sent: 10/28/2021   1:16 PM CDT  To: Kelley Monterroso RN  Subject: RE: F/U Testing                                  Rome Benson,    Pt's CPX, CT PE and PFT are scheduled on 11/2. Please contact pt if there is any pre-procedure instrucitons involved.     Her RHC w/ TT is scheduled for 11/10. Please postpone to review pre-procedure.    During the call she had several questions for me, all of which I was not able to answer:  1. CPX is with a treadmill, is that correct? I said yes, but could be very very wrong.  2. For the CT PE, is that with gadolinium or idodine contrast? Confirmed iodine (again, I could be wrong)  3. If iodine, is the contrast on the CT PE low dose contrast? I did not answer this question cause I wasn't sure.  4. For the RHC w/ exercise can we complete it with chemical's instead of a elliptical due to back pain? I told her I would discuss this with TT as I was only ever told it was with this bike looking thing where the patient peddles.     Thank you!  -Berta  ----- Message -----  From: Kelley Monterroso RN  Sent: 10/26/2021   3:22 PM CDT  To: Berta Orellana  Subject: FW: F/U Testing                                  All orders are in, just let me know once scheduled.    Thanks!  Kelley  ----- Message -----  From: Enedina Ren RN  Sent: 10/25/2021   9:46 AM CDT  To: Kelley Monterroso RN, Berta Orellana  Subject: F/U Testing                                      Good morning ladies,    New PH patient that needs the following:  - CPX, PFTs, CTPE, and RHC w/ exercise with Dr. Rees in the next 3-4 weeks  - Follow up with Dr. Rees after testing to review results    Berta can you please reach out to her this week to get scheduled?    She was also placed on a 7 day Zio for  palpitations. Kelley, can you please follow up on the results?    Thank you!Preston  ------------------------------------------  Called patient and reviewed testing itinerary and pre-procedure instructions.  Clarified with Ct that they will be using Iodine based contrast, which I relayed to patient. Patient verbalized understanding, agreed with plan and denied any further questions. Kelley Monterroso RN on 11/1/2021 at 2:50 PM

## 2021-11-02 ENCOUNTER — HOSPITAL ENCOUNTER (OUTPATIENT)
Dept: CT IMAGING | Facility: CLINIC | Age: 71
End: 2021-11-02
Attending: INTERNAL MEDICINE
Payer: COMMERCIAL

## 2021-11-02 ENCOUNTER — HOSPITAL ENCOUNTER (OUTPATIENT)
Dept: CARDIOLOGY | Facility: CLINIC | Age: 71
End: 2021-11-02
Attending: INTERNAL MEDICINE
Payer: COMMERCIAL

## 2021-11-02 DIAGNOSIS — R06.02 SOB (SHORTNESS OF BREATH): ICD-10-CM

## 2021-11-02 DIAGNOSIS — I27.20 PULMONARY HTN (H): ICD-10-CM

## 2021-11-02 PROCEDURE — 94726 PLETHYSMOGRAPHY LUNG VOLUMES: CPT | Performed by: INTERNAL MEDICINE

## 2021-11-02 PROCEDURE — 94621 CARDIOPULM EXERCISE TESTING: CPT

## 2021-11-02 PROCEDURE — 94621 CARDIOPULM EXERCISE TESTING: CPT | Mod: 26 | Performed by: INTERNAL MEDICINE

## 2021-11-02 PROCEDURE — 250N000011 HC RX IP 250 OP 636: Performed by: PREVENTIVE MEDICINE

## 2021-11-02 PROCEDURE — 94729 DIFFUSING CAPACITY: CPT | Performed by: INTERNAL MEDICINE

## 2021-11-02 PROCEDURE — 71275 CT ANGIOGRAPHY CHEST: CPT | Mod: 26 | Performed by: RADIOLOGY

## 2021-11-02 PROCEDURE — 71275 CT ANGIOGRAPHY CHEST: CPT

## 2021-11-02 PROCEDURE — 94375 RESPIRATORY FLOW VOLUME LOOP: CPT | Performed by: INTERNAL MEDICINE

## 2021-11-02 RX ORDER — IOPAMIDOL 755 MG/ML
100 INJECTION, SOLUTION INTRAVASCULAR ONCE
Status: COMPLETED | OUTPATIENT
Start: 2021-11-02 | End: 2021-11-02

## 2021-11-02 RX ADMIN — IOPAMIDOL 100 ML: 755 INJECTION, SOLUTION INTRAVENOUS at 12:42

## 2021-11-03 LAB
CARDIOPULMONARY BLOOD PRESSURE REST: NORMAL MMHG
CARDIOPULMONARY BREATHING RESERVE REST: 92.8
CARDIOPULMONARY BREATHING RESERVE V02MAX: 61
CARDIOPULMONARY CO2 OUTPUT REST: 131 ML/MIN
CARDIOPULMONARY CO2 OUTPUT VO2MAX: 1160 ML/MIN
CARDIOPULMONARY FEV 1.0 (L) ACTUAL: 2.33
CARDIOPULMONARY FEV 1.0 (L) PRECENT: 92 %
CARDIOPULMONARY FEV 1.0 (L) PREDICTED: 2.53
CARDIOPULMONARY FEV 1.0 FVC (%) ACTUAL: 76.4
CARDIOPULMONARY FEV 1.0 FVC (%) PERCENT: 101 %
CARDIOPULMONARY FEV 1.0 FVC (%) PREDICTED: 75.7
CARDIOPULMONARY FUNCTIONAL CAPACITY MAX ML/KG/MIN: 19 ML/KG/MIN
CARDIOPULMONARY FUNCTIONAL CAPACITY PERCENT: 82 %
CARDIOPULMONARY FUNCTIONAL CAPACITY PREDICTED: 23.3 ML/KG/MIN
CARDIOPULMONARY FVC (L) ACTUAL: 3.05
CARDIOPULMONARY FVC (L) PERCENT: 91 %
CARDIOPULMONARY FVC (L) PREDICTED: 3.34
CARDIOPULMONARY HEART RATE REST: 79 BPM
CARDIOPULMONARY MET'S REST: 0.7
CARDIOPULMONARY MINUTE VENTILATION REST: 5.8 L/MIN
CARDIOPULMONARY MINUTE VENTILATION VO2MAX: 34.8 L/MIN
CARDIOPULMONARY MYOCARDIAC O2 DEMAND MAX: NORMAL
CARDIOPULMONARY OXYGEN CONSUMPTION REST: 2.5 ML/KG/MIN
CARDIOPULMONARY OXYGEN CONSUMPTION VO2MAX: 19 ML/KG/MIN
CARDIOPULMONARY OXYGEN PULSE REST: 2 ML/BEAT
CARDIOPULMONARY OXYGEN PULSE VO2MAX: 7 ML/BEAT
CARDIOPULMONARY OXYGEN SATURATION- OXIMETRY REST: 100 %
CARDIOPULMONARY OXYGEN SATURATION- OXIMETRY VO2MAX: 99 %
CARDIOPULMONARY PET C02 REST: 31
CARDIOPULMONARY PET C02 VO2MAX: 38
CARDIOPULMONARY PET02 REST: 116
CARDIOPULMONARY PET02 V02 MAX: 120
CARDIOPULMONARY RER: 0.94
CARDIOPULMONARY RESPIRALORY EXCHANGE RATIO VO2MAX: 0.94 ML/MIN
CARDIOPULMONARY RESPIRALORY EXCHANGE RATIO: 0.89
CARDIOPULMONARY RESPIRATORY RATE REST: 12 BR/MIN
CARDIOPULMONARY RESPIRATORY RATE VO2MAX: 28 BR/MIN
CARDIOPULMONARY ST DEPRESSION 2: 2 MM
CARDIOPULMONARY STRESS BASE 1 BP MMHG: NORMAL MMHG
CARDIOPULMONARY STRESS BASE 1 BPA: 128 BPM
CARDIOPULMONARY STRESS BASE 1 SPO2: 100 % SPO2
CARDIOPULMONARY STRESS BASE 1 TIME SEC: 0 SEC
CARDIOPULMONARY STRESS BASE 1 TIME: 1 MINS
CARDIOPULMONARY STRESS BASE 2 BP MMHG: NORMAL MMHG
CARDIOPULMONARY STRESS BASE 2 BPA: 86 BPM
CARDIOPULMONARY STRESS BASE 2 TIME SEC: 0 SEC
CARDIOPULMONARY STRESS BASE 2 TIME: 3 MINS
CARDIOPULMONARY STRESS BASE 3 BP MMHG: NORMAL MMHG
CARDIOPULMONARY STRESS BASE 3 BPA: 84 BPM
CARDIOPULMONARY STRESS BASE 3 TIME SEC: 0 SEC
CARDIOPULMONARY STRESS BASE 3 TIME: 5 MINS
CARDIOPULMONARY STRESS PHASE 1 BP MMHG: NORMAL MMHG
CARDIOPULMONARY STRESS PHASE 1 BPM: 120 BPM
CARDIOPULMONARY STRESS PHASE 1 SPO2: 99 % SPO2
CARDIOPULMONARY STRESS PHASE 1 TIME SEC: 0 SEC
CARDIOPULMONARY STRESS PHASE 1 TIME: 3 MINS
CARDIOPULMONARY STRESS PHASE 2 BP MMHG: NORMAL MMHG
CARDIOPULMONARY STRESS PHASE 2 BPM: 138 BPM
CARDIOPULMONARY STRESS PHASE 2 SPO2: 99 % SPO2
CARDIOPULMONARY STRESS PHASE 2 TIME SEC: 0 SEC
CARDIOPULMONARY STRESS PHASE 2 TIME: 3 MINS
CARDIOPULMONARY STRESS PHASE 3 BP MMHG: NORMAL MMHG
CARDIOPULMONARY STRESS PHASE 3 BPM: 167 BPM
CARDIOPULMONARY STRESS PHASE 3 SPO2: 99 % SPO2
CARDIOPULMONARY STRESS PHASE 3 TIME SEC: 32 SEC
CARDIOPULMONARY STRESS PHASE 3 TIME: 1 MINS
CARDIOPULMONARY SVC (L) ACTUAL: 2.85
CARDIOPULMONARY SVC (L) PERCENT: 85 %
CARDIOPULMONARY SVC (L) PREDICTED: 3.34
CARDIOPULMONARY TIDAL VOLUME REST: 500 ML
CARDIOPULMONARY TIDAL VOLUME VO2MAX: 1242 ML
CARDIOPULMONARY VE/VCO2 SLOPE: 25.67
CARDIOPULMONARY VENTILATORY EQUIVALENT 02 REST: 40
CARDIOPULMONARY VENTILATORY EQUIVALENT 02 V02: 28
CARDIOPULMONARY VENTILATORY EQUIVALENT C02 REST: 45
CARDIOPULMONARY VENTILATORY EQUIVALENT C02 SLOPE VO2MAX: 25.67
CARDIOPULMONARY VENTILATORY EQUIVALENT C02 VO2MAX: 30
CV STRESS MAX HR HE: 167
DLCOUNC-%PRED-PRE: 96 %
DLCOUNC-PRE: 20.71 ML/MIN/MMHG
DLCOUNC-PRED: 21.42 ML/MIN/MMHG
ERV-%PRED-PRE: 79 %
ERV-PRE: 0.83 L
ERV-PRED: 1.05 L
EXPTIME-PRE: 7.34 SEC
FEF2575-%PRED-PRE: 98 %
FEF2575-PRE: 1.93 L/SEC
FEF2575-PRED: 1.96 L/SEC
FEFMAX-%PRED-PRE: 78 %
FEFMAX-PRE: 4.74 L/SEC
FEFMAX-PRED: 6.04 L/SEC
FEV1-%PRED-PRE: 96 %
FEV1-PRE: 2.33 L
FEV1FEV6-PRE: 76 %
FEV1FEV6-PRED: 79 %
FEV1FVC-PRE: 76 %
FEV1FVC-PRED: 78 %
FEV1SVC-PRE: 82 %
FEV1SVC-PRED: 70 %
FIFMAX-PRE: 2.9 L/SEC
FRCPLETH-%PRED-PRE: 120 %
FRCPLETH-PRE: 3.49 L
FRCPLETH-PRED: 2.88 L
FVC-%PRED-PRE: 97 %
FVC-PRE: 3.05 L
FVC-PRED: 3.13 L
IC-%PRED-PRE: 85 %
IC-PRE: 2.02 L
IC-PRED: 2.37 L
Lab: 2 MM
PREDICTED VO2MAX: 23.3
RATED PERCEIVED EXERTION: 17
RVPLETH-%PRED-PRE: 120 %
RVPLETH-PRE: 2.66 L
RVPLETH-PRED: 2.22 L
STRESS ANGINA INDEX: 0
STRESS ECHO BASELINE BP: NORMAL MMHG
STRESS ECHO BASELINE HR: 74 BPM
STRESS ECHO CALCULATED PERCENT HR: 112 %
STRESS ECHO LAST STRESS BP: NORMAL MMHG
STRESS ECHO POST ESTIMATED WORKLOAD: 5.4 METS
STRESS ECHO POST EXERCISE DUR MIN: 7 MIN
STRESS ECHO POST EXERCISE DUR SEC: 32 SEC
STRESS ECHO TARGET HR: 149
TLCPLETH-%PRED-PRE: 101 %
TLCPLETH-PRE: 5.51 L
TLCPLETH-PRED: 5.44 L
VA-%PRED-PRE: 89 %
VA-PRE: 4.72 L
VC-%PRED-PRE: 83 %
VC-PRE: 2.85 L
VC-PRED: 3.42 L

## 2021-11-08 ENCOUNTER — LAB (OUTPATIENT)
Dept: URGENT CARE | Facility: URGENT CARE | Age: 71
End: 2021-11-08
Attending: INTERNAL MEDICINE
Payer: COMMERCIAL

## 2021-11-08 DIAGNOSIS — Z11.59 ENCOUNTER FOR SCREENING FOR OTHER VIRAL DISEASES: ICD-10-CM

## 2021-11-08 LAB — SARS-COV-2 RNA RESP QL NAA+PROBE: NEGATIVE

## 2021-11-08 PROCEDURE — U0005 INFEC AGEN DETEC AMPLI PROBE: HCPCS

## 2021-11-08 PROCEDURE — U0003 INFECTIOUS AGENT DETECTION BY NUCLEIC ACID (DNA OR RNA); SEVERE ACUTE RESPIRATORY SYNDROME CORONAVIRUS 2 (SARS-COV-2) (CORONAVIRUS DISEASE [COVID-19]), AMPLIFIED PROBE TECHNIQUE, MAKING USE OF HIGH THROUGHPUT TECHNOLOGIES AS DESCRIBED BY CMS-2020-01-R: HCPCS

## 2021-11-09 ENCOUNTER — TELEPHONE (OUTPATIENT)
Dept: CARDIOLOGY | Facility: CLINIC | Age: 71
End: 2021-11-09
Payer: COMMERCIAL

## 2021-11-10 ENCOUNTER — APPOINTMENT (OUTPATIENT)
Dept: MEDSURG UNIT | Facility: CLINIC | Age: 71
End: 2021-11-10
Attending: INTERNAL MEDICINE
Payer: COMMERCIAL

## 2021-11-10 ENCOUNTER — APPOINTMENT (OUTPATIENT)
Dept: LAB | Facility: CLINIC | Age: 71
End: 2021-11-10
Attending: INTERNAL MEDICINE
Payer: COMMERCIAL

## 2021-11-10 ENCOUNTER — HOSPITAL ENCOUNTER (OUTPATIENT)
Facility: CLINIC | Age: 71
Discharge: HOME | End: 2021-11-10
Attending: INTERNAL MEDICINE | Admitting: INTERNAL MEDICINE
Payer: COMMERCIAL

## 2021-11-10 VITALS
OXYGEN SATURATION: 95 % | SYSTOLIC BLOOD PRESSURE: 150 MMHG | RESPIRATION RATE: 16 BRPM | HEIGHT: 67 IN | WEIGHT: 125 LBS | HEART RATE: 75 BPM | TEMPERATURE: 97.7 F | BODY MASS INDEX: 19.62 KG/M2 | DIASTOLIC BLOOD PRESSURE: 79 MMHG

## 2021-11-10 DIAGNOSIS — R06.02 SOB (SHORTNESS OF BREATH): ICD-10-CM

## 2021-11-10 DIAGNOSIS — I27.20 PULMONARY HTN (H): ICD-10-CM

## 2021-11-10 LAB
ALBUMIN SERPL-MCNC: 3.8 G/DL (ref 3.4–5)
ALP SERPL-CCNC: 30 U/L (ref 40–150)
ALT SERPL W P-5'-P-CCNC: 18 U/L (ref 0–50)
ANION GAP SERPL CALCULATED.3IONS-SCNC: 6 MMOL/L (ref 3–14)
AST SERPL W P-5'-P-CCNC: 22 U/L (ref 0–45)
BASOPHILS # BLD AUTO: 0.1 10E3/UL (ref 0–0.2)
BASOPHILS NFR BLD AUTO: 1 %
BILIRUB SERPL-MCNC: 0.5 MG/DL (ref 0.2–1.3)
BUN SERPL-MCNC: 14 MG/DL (ref 7–30)
CALCIUM SERPL-MCNC: 9 MG/DL (ref 8.5–10.1)
CHLORIDE BLD-SCNC: 102 MMOL/L (ref 94–109)
CO2 SERPL-SCNC: 25 MMOL/L (ref 20–32)
CREAT SERPL-MCNC: 0.7 MG/DL (ref 0.52–1.04)
EOSINOPHIL # BLD AUTO: 0.1 10E3/UL (ref 0–0.7)
EOSINOPHIL NFR BLD AUTO: 2 %
ERYTHROCYTE [DISTWIDTH] IN BLOOD BY AUTOMATED COUNT: 12.2 % (ref 10–15)
GFR SERPL CREATININE-BSD FRML MDRD: 87 ML/MIN/1.73M2
GLUCOSE BLD-MCNC: 90 MG/DL (ref 70–99)
HCT VFR BLD AUTO: 41.4 % (ref 35–47)
HGB BLD-MCNC: 12.3 G/DL (ref 11.7–15.7)
HGB BLD-MCNC: 12.4 G/DL (ref 11.7–15.7)
HGB BLD-MCNC: 13.4 G/DL (ref 11.7–15.7)
IMM GRANULOCYTES # BLD: 0 10E3/UL
IMM GRANULOCYTES NFR BLD: 0 %
INR PPP: 1.03 (ref 0.85–1.15)
LYMPHOCYTES # BLD AUTO: 1.7 10E3/UL (ref 0.8–5.3)
LYMPHOCYTES NFR BLD AUTO: 37 %
MCH RBC QN AUTO: 30.5 PG (ref 26.5–33)
MCHC RBC AUTO-ENTMCNC: 32.4 G/DL (ref 31.5–36.5)
MCV RBC AUTO: 94 FL (ref 78–100)
MONOCYTES # BLD AUTO: 0.5 10E3/UL (ref 0–1.3)
MONOCYTES NFR BLD AUTO: 11 %
NEUTROPHILS # BLD AUTO: 2.2 10E3/UL (ref 1.6–8.3)
NEUTROPHILS NFR BLD AUTO: 49 %
NRBC # BLD AUTO: 0 10E3/UL
NRBC BLD AUTO-RTO: 0 /100
NT-PROBNP SERPL-MCNC: 391 PG/ML (ref 0–900)
OXYHGB MFR BLDV: 39 % (ref 92–100)
OXYHGB MFR BLDV: 77 % (ref 92–100)
PLATELET # BLD AUTO: 296 10E3/UL (ref 150–450)
POTASSIUM BLD-SCNC: 4.1 MMOL/L (ref 3.4–5.3)
PROT SERPL-MCNC: 7.5 G/DL (ref 6.8–8.8)
RBC # BLD AUTO: 4.39 10E6/UL (ref 3.8–5.2)
SODIUM SERPL-SCNC: 133 MMOL/L (ref 133–144)
WBC # BLD AUTO: 4.6 10E3/UL (ref 4–11)

## 2021-11-10 PROCEDURE — 93451 RIGHT HEART CATH: CPT | Performed by: INTERNAL MEDICINE

## 2021-11-10 PROCEDURE — 999N000142 HC STATISTIC PROCEDURE PREP ONLY

## 2021-11-10 PROCEDURE — 83880 ASSAY OF NATRIURETIC PEPTIDE: CPT | Performed by: INTERNAL MEDICINE

## 2021-11-10 PROCEDURE — 93464 EXERCISE W/HEMODYNAMIC MEAS: CPT | Performed by: INTERNAL MEDICINE

## 2021-11-10 PROCEDURE — 999N000132 HC STATISTIC PP CARE STAGE 1

## 2021-11-10 PROCEDURE — 82810 BLOOD GASES O2 SAT ONLY: CPT

## 2021-11-10 PROCEDURE — 250N000009 HC RX 250: Performed by: INTERNAL MEDICINE

## 2021-11-10 PROCEDURE — 80053 COMPREHEN METABOLIC PANEL: CPT | Performed by: INTERNAL MEDICINE

## 2021-11-10 PROCEDURE — 85018 HEMOGLOBIN: CPT

## 2021-11-10 PROCEDURE — 272N000001 HC OR GENERAL SUPPLY STERILE: Performed by: INTERNAL MEDICINE

## 2021-11-10 PROCEDURE — 36415 COLL VENOUS BLD VENIPUNCTURE: CPT | Performed by: INTERNAL MEDICINE

## 2021-11-10 PROCEDURE — C1894 INTRO/SHEATH, NON-LASER: HCPCS | Performed by: INTERNAL MEDICINE

## 2021-11-10 PROCEDURE — 85025 COMPLETE CBC W/AUTO DIFF WBC: CPT | Performed by: INTERNAL MEDICINE

## 2021-11-10 PROCEDURE — 272N000002 HC OR SUPPLY OTHER OPNP: Performed by: INTERNAL MEDICINE

## 2021-11-10 PROCEDURE — 85610 PROTHROMBIN TIME: CPT | Performed by: INTERNAL MEDICINE

## 2021-11-10 RX ORDER — LIDOCAINE 40 MG/G
CREAM TOPICAL
Status: COMPLETED | OUTPATIENT
Start: 2021-11-10 | End: 2021-11-10

## 2021-11-10 RX ADMIN — LIDOCAINE: 40 CREAM TOPICAL at 07:27

## 2021-11-10 ASSESSMENT — MIFFLIN-ST. JEOR: SCORE: 1114.63

## 2021-11-10 NOTE — PROGRESS NOTES
[facility]   Interventional Cardiology        Consenting/Education for Right Heart Catheterization with exercise     Patient understands due to their evaluation for pulmonary hypertension we would like to perform right heart catheterization with possible exercise study.  This procedure will be performed by Dr. Rees    Patient understands during the right heart catheterization, a fine tube (catheter) is put into the vein of the groin/neck.  It is carefully passed along until it reaches the heart and then goes up into the blood vessels of the lungs. This is done to measure a variety of pressures in your heart and can tell us how well the heart is filling and emptying, as well as monitor fluid status. This is usually painless. The doctor uses x-ray imaging to see the catheter. Afterwards, the catheter is removed, and incision site covered before leaving the cath lab. This procedure is done with no sedation, usually only requiring Lidocaine.     Patient also understands risks and complications of the procedure which include, but are not limited to bruising/swelling around the incision site, infection, bleeding, allergic reaction to local anesthetic, air embolism, arterial puncture, stroke, heart attack.       Patient verbalized understanding of risks and benefits of the right heart catheterization and has elected to proceed with right heart catheterization with exercise.       Morena KIRBY, AYSE  [facility] Interventional Cardiology  [phone]

## 2021-11-10 NOTE — PROGRESS NOTES
Pt arrived on 2a post RHC. VSS RA. Dressing c/d/i. No pain. Discharge instructions reviewed, copy given to pt. Pt eating and drinking. Family at bedside. Pt will discharge home after speaking with Dr. Reene.

## 2021-11-10 NOTE — PROGRESS NOTES
Dear Dr. Queen,    We had the pleasure of seeing Ms. Sarah العلي for follow-up in her pulmonary hypertension clinic today.  As you know she is a 71-year-old female with Raynaud's disease and family history of scleroderma who we saw in clinic last month for exertional shortness of breath.    She returns today for completing the work-up.    Her pulmonary function test showed an FVC of 97% FEV1 of 96%, FEV1 by FVC of 76%, TLC of 101% and a DLCO of 96%.    Her CT dual-energy pulmonary angiogram showed no evidence of chronic thromboembolic disease.  She had no parenchymal abnormalities.  She had multiple solitary nodules.    She had a cardiopulmonary exercise testing.  She exercised for 7 minutes and 32 seconds.  She did not achieve anaerobic threshold.  Her RER was 0.94.  Her VO2 was 19 mL/kg/min with was 82% age of 18 gender predicted.  Her VE VCO2 slope was normal at 25.7.  She had appropriate blood pressure and heart rate response.  She did not encroach upon her breathing reserve.  Her breathing reserve at peak exercise was normal at 61.    Her NT proBNP was normal.  Her CBC chemistry and liver function tests were unremarkable.    She had exercise right heart catheterization with the following hemodynamics.    Baseline:  RA: 7/10/7  RV: 35/8  PCWP: 13/20/13  PA: 35/15/24  PA Sat: 77.2%  Hb: 12.4  HR: 72  Ao Sat: 100%  Basilio CO/CI: 4.9/2.9  TD CO/CI: 4.1/2.5  VO2: 187      Exercise (4 min 50 seconds):  RA: 8/14/8  PA: 54/35/42  PCWP: 35/51/35  PA Sat: 39.3%  Hb: 12.3  HR: 138  Ao Sat: 97%  Basilio CO/CI: 8.5/5.1  VO2: 821    Assessment and Plan:  In summary Ms. العلي is a very pleasant 71-year-old female with Raynaud's disease and a family history of scleroderma who was referred to us for evaluation of exertional shortness of breath of unclear etiology.    Exercise-induced heart failure with preserved ejection fraction     Based on the testing she has had so far it appears that she has exercise-induced heart failure  with preserved ejection fraction.  At rest she has borderline high PA pressure with high normal wedge pressure.  During exercise her pulmonary capillary wedge pressure increases significantly.  She has significant elevation in her PA pressure out of proportion to increase in her cardiac output.  Her PVR does decrease with exercise which would suggest heart failure with preserved ejection fraction rather than continuous-induced PAH.    I discussed the diagnosis, natural history and prognosis of exercise-induced heart failure with preserved ejection fraction.  Unfortunately currently there is no targeted therapy for exercise-induced asthma.  I discussed off label use of sildenafil 20 mg 3 times a day are sodium glucose cotransporter receptor inhibitor.  We will await her decision before we start her on either of these therapies.     Alternatively, we can observe her clinically closely.  We discussed the importance of low-salt diet and fluid restriction.  We also discussed the importance of regular exercise.    ST segment depression in V5 and V6 on the exercise treadmill test    She has had a dobutamine stress echo last year that was unremarkable.  Her coronary calcium score was also 0 last year.  Clinically she does not have any symptoms suggestive of angina.  Thus I do not think she needs any further evaluation at this time point for this.    Pulmonary nodules  We will have her repeat a CT in a year and follow-up with her primary care physician.      She will return to see us in clinic in 4 to 6 weeks to discuss the treatment plan.  It was a pleasure seeing Ms. العلي in our bone hypertension clinic at [facility].    Sincerely,  Negrita Rees MD   [facility] Pulmonary [facility]pertension  Heart Failure, Transplant, and Mechanical Circulatory Support Cardiology   Cardiovascular Division  [facility]   [phone]

## 2021-11-10 NOTE — DISCHARGE INSTRUCTIONS
[facility]                        Interventional Cardiology  Discharge Instructions   Post Right Heart Cath      AFTER YOU GO HOME:    DO drink plenty of fluids    DO resume your regular diet and medications unless otherwise instructed by your Primary Physician    Do Not scrub the procedure site vigorously    No lotion or powder to the puncture site for 3 days    CALL YOUR PRIMARY PHYSICIAN IF: You may resume all normal activity.  Monitor neck site for bleeding, swelling, or voice changes. If you notice bleeding or swelling immediately apply pressure to the site and call number below to speak with Cardiology Fellow.  If you experience any changes in your breathing you should call your doctor immediately or come to the closest Emergency Department.  Do not drive yourself.    ADDITIONAL INSTRUCTIONS: Medications: You are to resume all home medications including anticoagulation therapy unless otherwise advised by your primary cardiologist or nurse coordinator.    Follow Up: Per your primary cardiology team    If you have any questions or concerns regarding your procedure site please call [phone] at anytime and ask for Cardiology Fellow on call.  They are available 24 hours a day.  You may also contact the Cardiology Clinic after hours number at [phone].                                                       Telephone Numbers [phone] Monday-Friday 8:00 am to 4:30 pm    [phone] After 4:30 pm Monday-Friday, Weekends & Holidays  Ask for Interventional Cardiologist on call. Someone is on call 24 hours/day   [facility] toll free number [phone] Monday-Friday 8:00 am to 4:30 pm   [facility] Emergency Dept [phone]

## 2021-11-11 ENCOUNTER — TELEPHONE (OUTPATIENT)
Dept: CARDIOLOGY | Facility: CLINIC | Age: 71
End: 2021-11-11
Payer: COMMERCIAL

## 2021-11-11 NOTE — TELEPHONE ENCOUNTER
----- Message from Nicole Finley RN sent at 11/11/2021 11:16 AM CST -----    ----- Message -----  From: Negrita Rees MD  Sent: 11/10/2021   5:02 PM CST  To: Berta Orellana, Cardiology Ph Nurse-    Team:  She completed her testing. She has exercise induced HFpEF.    Plan:    Follow up in clinic to close the loop at the next available slot    Thank you    TT  ------------------------------------  No  Openings until Dec 20th, so I'm checking with Berta for options sooner. Kelley Monterroso RN on 11/11/2021 at 12:31 PM

## 2021-11-19 ENCOUNTER — OFFICE VISIT (OUTPATIENT)
Dept: CARDIOLOGY | Facility: CLINIC | Age: 71
End: 2021-11-19
Attending: INTERNAL MEDICINE
Payer: COMMERCIAL

## 2021-11-19 VITALS
HEIGHT: 68 IN | WEIGHT: 128 LBS | HEART RATE: 82 BPM | BODY MASS INDEX: 19.4 KG/M2 | OXYGEN SATURATION: 99 % | SYSTOLIC BLOOD PRESSURE: 118 MMHG | DIASTOLIC BLOOD PRESSURE: 75 MMHG

## 2021-11-19 DIAGNOSIS — I50.30 HEART FAILURE WITH PRESERVED EJECTION FRACTION, UNSPECIFIED HF CHRONICITY (H): ICD-10-CM

## 2021-11-19 DIAGNOSIS — I27.20 PULMONARY HYPERTENSION (H): Chronic | ICD-10-CM

## 2021-11-19 DIAGNOSIS — R91.8 PULMONARY NODULES: Primary | ICD-10-CM

## 2021-11-19 PROCEDURE — 99214 OFFICE O/P EST MOD 30 MIN: CPT | Performed by: INTERNAL MEDICINE

## 2021-11-19 PROCEDURE — G0463 HOSPITAL OUTPT CLINIC VISIT: HCPCS

## 2021-11-19 RX ORDER — ALUMINUM ZIRCONIUM TETRACHLOROHYDREX GLY 0.18 G/G
STICK TOPICAL
Status: ON HOLD | COMMUNITY
Start: 2020-05-01 | End: 2023-09-22

## 2021-11-19 ASSESSMENT — PAIN SCALES - GENERAL: PAINLEVEL: NO PAIN (0)

## 2021-11-19 ASSESSMENT — MIFFLIN-ST. JEOR: SCORE: 1139.61

## 2021-11-19 NOTE — NURSING NOTE
Diet: Patient instructed regarding a heart failure healthy diet, including discussion of reduced fat and 2000 mg daily sodium restriction, daily weights, medication purpose and compliance, fluid restrictions and resources for patient and family to access for assistance with heart failure management.       Labs: Patient was given results of the laboratory testing obtained today and patient was instructed about when to return for the next laboratory testing.     Med Reconcile: Reviewed and verified all current medications with the patient. The updated medication list was printed and given to the patient. NO CHANGES    Return Appointment: Patient given instructions regarding scheduling next clinic visit. RTC in 1 year with labs and echo prior    Patient stated she understood all health information given and agreed to call with further questions or concerns.     Cyndee Fischer RN

## 2021-11-19 NOTE — PATIENT INSTRUCTIONS
"You were seen today in the Cardiovascular Clinic at the [facility].       Cardiology Providers you saw during your visit: Dr. Rees      Medication Changes:   1. No changes      Follow up Appointment Information:  1. Follow up with Dr. Rees in 1 year with echo prior  Please call us if your chest pain is worsening or you have questions/concerns         [address] [place] [address] [place] [address]        Thank you for allowing us to be a part of your care here at the [facility]      If you have questions or concerns please contact us at:      Cyndee Fischer RN BSN   Cardiology Care Coordinator  [facility]   Questions and [phone]   First press #1 for the [facility] and then press #4 to \"send a message to your care team\"     "

## 2021-11-19 NOTE — LETTER
11/19/2021      RE: Sarah العلي  [address]  [place] [address]       Dear Dr. Queen,    We had the pleasure of seeing Ms. Sarah العلي for follow-up in her pulmonary hypertension clinic today.  As you know she is a 71-year-old female with Raynaud's disease and family history of scleroderma who we saw in clinic last month for exertional shortness of breath.    She returns today to discuss her recent testing.     Her pulmonary function test showed an FVC of 97% FEV1 of 96%, FEV1 by FVC of 76%, TLC of 101% and a DLCO of 96%.    Her CT dual-energy pulmonary angiogram showed no evidence of chronic thromboembolic disease.  She had no parenchymal abnormalities.  She had multiple solitary nodules.    She had a cardiopulmonary exercise testing.  She exercised for 7 minutes and 32 seconds.  She did not achieve anaerobic threshold.  Her RER was 0.94.  Her VO2 was 19 mL/kg/min with was 82% age of 18 gender predicted.  Her VE VCO2 slope was normal at 25.7.  She had appropriate blood pressure and heart rate response.  She did not encroach upon her breathing reserve.  Her breathing reserve at peak exercise was normal at 61.    Her NT proBNP was normal.  Her CBC chemistry and liver function tests were unremarkable.    She had exercise right heart catheterization with the following hemodynamics.    Baseline:  RA: 7/10/7  RV: 35/8  PCWP: 13/20/13  PA: 35/15/24  PA Sat: 77.2%  Hb: 12.4  HR: 72  Ao Sat: 100%  Basilio CO/CI: 4.9/2.9  TD CO/CI: 4.1/2.5  VO2: 187      Exercise (4 min 50 seconds):  RA: 8/14/8  PA: 54/35/42  PCWP: 35/51/35  PA Sat: 39.3%  Hb: 12.3  HR: 138  Ao Sat: 97%  Basilio CO/CI: 8.5/5.1  VO2: 821      PMH:  Past Medical History:   Diagnosis Date     Age-related osteoporosis without current pathological fracture 2/22/2019     Benign positional vertigo decades     Bilateral hearing loss, unspecified hearing loss type 12/6/2018     CARDIOVASCULAR SCREENING; LDL GOAL LESS THAN 160 7/17/2013     Cerebral  "aneurysm 12/2017     Erythema multiforme 9/26/2016     GERD (gastroesophageal reflux disease)      Hearing problem '04 & \"18    SNHL mild L ear;moderate-severe R ear  >75% loss word compre     Hematuria 9/26/2016    Overview:  Has had urologic evaluation     Laryngospasm 9/26/2016     Lichen planus      Migraine     with auora     Migraines 2000    Ocular migraine/ Migraine with Aura     Ocular migraine 9/26/2016     Oral lichen planus 9/26/2016     Osteopenia 2010     Osteopenia of left hip 12/12/2018     Physical exam 9/26/2016    Overview:  Full code.  Would want her  and son to make medical decisions for her if she were unable to do so.  Does not have an advanced directive.     Overview:  Diet - tries to eat a healthy diet  Exercise - \"I'm a fair weather walker\"  Active during the day Mammogram - 10/2017  Colonoscopy - 2/2008 - next in 2018  DXA - 8/2015 - next in 2018  Immunizations -  Up to date      Right internal carotid artery aneurysm 12/6/2018    5 mm     Sensorineural hearing loss 2/04 & 4/18 2/18 worsened     Tinnitus 12/2018    R ear only     White coat syndrome without diagnosis of hypertension 12/6/2018     Current Medications    Current Outpatient Medications   Medication Sig     albuterol (PROAIR HFA/PROVENTIL HFA/VENTOLIN HFA) 108 (90 Base) MCG/ACT inhaler Inhale 2 puffs into the lungs as needed     Cholecalciferol (VITAMIN D3) 1000 UNITS CAPS Take 1 capsule by mouth daily     fluocinonide (LIDEX) 0.05 % external gel Apply topically 2 times daily     omeprazole (PRILOSEC) 10 MG DR capsule Take 20 mg by mouth as needed     Psyllium (METAMUCIL FREE & NATURAL) 43 % POWD      methylcellulose (CITRUCEL) 500 MG TABS tablet  (Patient not taking: Reported on 11/19/2021)     No current facility-administered medications for this visit.     Exam:  /75 (BP Location: Left arm, Patient Position: Chair, Cuff Size: Adult Regular)   Pulse 82   Ht 1.72 m (5' 7.72\")   Wt 58.1 kg (128 lb)   SpO2 " 99%   BMI 19.63 kg/m    She was awake, alert, oriented x3.  She was comfortable.  She was in no apparent distress.  She had no pallor, cyanosis or jaundice.  Her neck exam revealed no jugular venous distention.  She had no lower extremity edema.    Assessment and Plan:  In summary Ms. العلي is a very pleasant 71-year-old female with Raynaud's disease and a family history of scleroderma who was referred to us for evaluation of exertional shortness of breath of unclear etiology.    Exercise-induced heart failure with preserved ejection fraction     Based on the testing she has had so far it appears that she has exercise-induced heart failure with preserved ejection fraction.  At rest she has borderline high PA pressure with high normal wedge pressure.  During exercise her pulmonary capillary wedge pressure increases significantly.  She has significant elevation in her PA pressure out of proportion to increase in her cardiac output.  Her PVR does decrease with exercise which would suggest heart failure with preserved ejection fraction rather than continuous-induced PAH.    I discussed the diagnosis, natural history and prognosis of exercise-induced heart failure with preserved ejection fraction.  Unfortunately currently there is no targeted therapy for exercise-induced asthma.  I discussed off label use of sildenafil 20 mg 3 times a day or sodium glucose cotransporter receptor inhibitor.  She is not interested in this which I think is very appropriate.    We will observe her clinically closely.  We discussed the importance of low-salt diet and fluid restriction.  We also discussed the importance of regular exercise.    ST segment depression in V5 and V6 on the exercise treadmill test    She has had a dobutamine stress echo last year that was unremarkable.  Her coronary calcium score was also 0 last year.  Clinically she does not have any symptoms suggestive of angina.  Thus I do not think she needs any further  evaluation at this time point for this.  She knows to call us if she were to have worsening exertional chest pain or chest pressure.    Pulmonary nodules  She was recently seen in the pulmonary nodule clinic at [facility].  They have recommended her to have a repeat CT scan of the chest in a year.  She will follow up with them.    I have recommended her to return to clinic in a year with a repeat echocardiogram and labs.  She will call us in the interim with any further worsening symptoms. It was a pleasure seeing Ms. العلي in our bone hypertension clinic at [facility].    Total time today was 35 minutes reviewing notes, imaging, labs, patient visit, orders and documentation         Sincerely,  Negrita Rees MD   Center for Pulmonary Hypertension  Heart Failure, Transplant, and Mechanical Circulatory Support Cardiology   Cardiovascular Division  [facility]   [phone]                            Negrita Rees MD

## 2021-11-19 NOTE — NURSING NOTE
No chief complaint on file.    Vitals were taken and medications reconciled.    Gurdeep Joy, EMT  1:08 PM

## 2021-11-19 NOTE — LETTER
11/19/2021      RE: Sarah العلي  [address]  [place] [address]       Dear Colleague,    Thank you for the opportunity to participate in the care of your patient, Sarah العلي, at the [facility] [place] [facility]. Please see a copy of my visit note below.    Dear Dr. Queen,    We had the pleasure of seeing Ms. Sarah العلي for follow-up in her pulmonary hypertension clinic today.  As you know she is a 71-year-old female with Raynaud's disease and family history of scleroderma who we saw in clinic last month for exertional shortness of breath.    She returns today to discuss her recent testing.     Her pulmonary function test showed an FVC of 97% FEV1 of 96%, FEV1 by FVC of 76%, TLC of 101% and a DLCO of 96%.    Her CT dual-energy pulmonary angiogram showed no evidence of chronic thromboembolic disease.  She had no parenchymal abnormalities.  She had multiple solitary nodules.    She had a cardiopulmonary exercise testing.  She exercised for 7 minutes and 32 seconds.  She did not achieve anaerobic threshold.  Her RER was 0.94.  Her VO2 was 19 mL/kg/min with was 82% age of 18 gender predicted.  Her VE VCO2 slope was normal at 25.7.  She had appropriate blood pressure and heart rate response.  She did not encroach upon her breathing reserve.  Her breathing reserve at peak exercise was normal at 61.    Her NT proBNP was normal.  Her CBC chemistry and liver function tests were unremarkable.    She had exercise right heart catheterization with the following hemodynamics.    Baseline:  RA: 7/10/7  RV: 35/8  PCWP: 13/20/13  PA: 35/15/24  PA Sat: 77.2%  Hb: 12.4  HR: 72  Ao Sat: 100%  Basilio CO/CI: 4.9/2.9  TD CO/CI: 4.1/2.5  VO2: 187      Exercise (4 min 50 seconds):  RA: 8/14/8  PA: 54/35/42  PCWP: 35/51/35  PA Sat: 39.3%  Hb: 12.3  HR: 138  Ao Sat: 97%  Basilio CO/CI: 8.5/5.1  VO2: 821      PMH:  Past Medical History:   Diagnosis Date  "    Age-related osteoporosis without current pathological fracture 2/22/2019     Benign positional vertigo decades     Bilateral hearing loss, unspecified hearing loss type 12/6/2018     CARDIOVASCULAR SCREENING; LDL GOAL LESS THAN 160 7/17/2013     Cerebral aneurysm 12/2017     Erythema multiforme 9/26/2016     GERD (gastroesophageal reflux disease)      Hearing problem '04 & \"18    SNHL mild L ear;moderate-severe R ear  >75% loss word compre     Hematuria 9/26/2016    Overview:  Has had urologic evaluation     Laryngospasm 9/26/2016     Lichen planus      Migraine     with auora     Migraines 2000    Ocular migraine/ Migraine with Aura     Ocular migraine 9/26/2016     Oral lichen planus 9/26/2016     Osteopenia 2010     Osteopenia of left hip 12/12/2018     Physical exam 9/26/2016    Overview:  Full code.  Would want her  and son to make medical decisions for her if she were unable to do so.  Does not have an advanced directive.     Overview:  Diet - tries to eat a healthy diet  Exercise - \"I'm a fair weather walker\"  Active during the day Mammogram - 10/2017  Colonoscopy - 2/2008 - next in 2018  DXA - 8/2015 - next in 2018  Immunizations -  Up to date      Right internal carotid artery aneurysm 12/6/2018    5 mm     Sensorineural hearing loss 2/04 & 4/18 2/18 worsened     Tinnitus 12/2018    R ear only     White coat syndrome without diagnosis of hypertension 12/6/2018     Current Medications    Current Outpatient Medications   Medication Sig     albuterol (PROAIR HFA/PROVENTIL HFA/VENTOLIN HFA) 108 (90 Base) MCG/ACT inhaler Inhale 2 puffs into the lungs as needed     Cholecalciferol (VITAMIN D3) 1000 UNITS CAPS Take 1 capsule by mouth daily     fluocinonide (LIDEX) 0.05 % external gel Apply topically 2 times daily     omeprazole (PRILOSEC) 10 MG DR capsule Take 20 mg by mouth as needed     Psyllium (METAMUCIL FREE & NATURAL) 43 % POWD      methylcellulose (CITRUCEL) 500 MG TABS tablet  (Patient not " "taking: Reported on 11/19/2021)     No current facility-administered medications for this visit.     Exam:  /75 (BP Location: Left arm, Patient Position: Chair, Cuff Size: Adult Regular)   Pulse 82   Ht 1.72 m (5' 7.72\")   Wt 58.1 kg (128 lb)   SpO2 99%   BMI 19.63 kg/m    She was awake, alert, oriented x3.  She was comfortable.  She was in no apparent distress.  She had no pallor, cyanosis or jaundice.  Her neck exam revealed no jugular venous distention.  She had no lower extremity edema.    Assessment and Plan:  In summary Ms. العلي is a very pleasant 71-year-old female with Raynaud's disease and a family history of scleroderma who was referred to us for evaluation of exertional shortness of breath of unclear etiology.    Exercise-induced heart failure with preserved ejection fraction     Based on the testing she has had so far it appears that she has exercise-induced heart failure with preserved ejection fraction.  At rest she has borderline high PA pressure with high normal wedge pressure.  During exercise her pulmonary capillary wedge pressure increases significantly.  She has significant elevation in her PA pressure out of proportion to increase in her cardiac output.  Her PVR does decrease with exercise which would suggest heart failure with preserved ejection fraction rather than continuous-induced PAH.    I discussed the diagnosis, natural history and prognosis of exercise-induced heart failure with preserved ejection fraction.  Unfortunately currently there is no targeted therapy for exercise-induced asthma.  I discussed off label use of sildenafil 20 mg 3 times a day or sodium glucose cotransporter receptor inhibitor.  She is not interested in this which I think is very appropriate.    We will observe her clinically closely.  We discussed the importance of low-salt diet and fluid restriction.  We also discussed the importance of regular exercise.    ST segment depression in V5 and V6 on the " exercise treadmill test    She has had a dobutamine stress echo last year that was unremarkable.  Her coronary calcium score was also 0 last year.  Clinically she does not have any symptoms suggestive of angina.  Thus I do not think she needs any further evaluation at this time point for this.  She knows to call us if she were to have worsening exertional chest pain or chest pressure.    Pulmonary nodules  She was recently seen in the pulmonary nodule clinic at [facility].  They have recommended her to have a repeat CT scan of the chest in a year.  She will follow up with them.    I have recommended her to return to clinic in a year with a repeat echocardiogram and labs.  She will call us in the interim with any further worsening symptoms. It was a pleasure seeing Ms. العلي in our bone hypertension clinic at [facility].    Total time today was 35 minutes reviewing notes, imaging, labs, patient visit, orders and documentation         Sincerely,  Negrita Rees MD   Center for Pulmonary Hypertension  Heart Failure, Transplant, and Mechanical Circulatory Support Cardiology   Cardiovascular Division  [facility]   [phone]                            Please do not hesitate to contact me if you have any questions/concerns.     Sincerely,     Negrita Rees MD

## 2021-11-19 NOTE — PROGRESS NOTES
Dear Dr. Queen,    We had the pleasure of seeing Ms. Sarah العلي for follow-up in her pulmonary hypertension clinic today.  As you know she is a 71-year-old female with Raynaud's disease and family history of scleroderma who we saw in clinic last month for exertional shortness of breath.    She returns today to discuss her recent testing.     Her pulmonary function test showed an FVC of 97% FEV1 of 96%, FEV1 by FVC of 76%, TLC of 101% and a DLCO of 96%.    Her CT dual-energy pulmonary angiogram showed no evidence of chronic thromboembolic disease.  She had no parenchymal abnormalities.  She had multiple solitary nodules.    She had a cardiopulmonary exercise testing.  She exercised for 7 minutes and 32 seconds.  She did not achieve anaerobic threshold.  Her RER was 0.94.  Her VO2 was 19 mL/kg/min with was 82% age of 18 gender predicted.  Her VE VCO2 slope was normal at 25.7.  She had appropriate blood pressure and heart rate response.  She did not encroach upon her breathing reserve.  Her breathing reserve at peak exercise was normal at 61.    Her NT proBNP was normal.  Her CBC chemistry and liver function tests were unremarkable.    She had exercise right heart catheterization with the following hemodynamics.    Baseline:  RA: 7/10/7  RV: 35/8  PCWP: 13/20/13  PA: 35/15/24  PA Sat: 77.2%  Hb: 12.4  HR: 72  Ao Sat: 100%  Basilio CO/CI: 4.9/2.9  TD CO/CI: 4.1/2.5  VO2: 187      Exercise (4 min 50 seconds):  RA: 8/14/8  PA: 54/35/42  PCWP: 35/51/35  PA Sat: 39.3%  Hb: 12.3  HR: 138  Ao Sat: 97%  Basilio CO/CI: 8.5/5.1  VO2: 821      PMH:  Past Medical History:   Diagnosis Date     Age-related osteoporosis without current pathological fracture 2/22/2019     Benign positional vertigo decades     Bilateral hearing loss, unspecified hearing loss type 12/6/2018     CARDIOVASCULAR SCREENING; LDL GOAL LESS THAN 160 7/17/2013     Cerebral aneurysm 12/2017     Erythema multiforme 9/26/2016     GERD (gastroesophageal reflux  "disease)      Hearing problem '04 & \"18    SNHL mild L ear;moderate-severe R ear  >75% loss word compre     Hematuria 9/26/2016    Overview:  Has had urologic evaluation     Laryngospasm 9/26/2016     Lichen planus      Migraine     with auora     Migraines 2000    Ocular migraine/ Migraine with Aura     Ocular migraine 9/26/2016     Oral lichen planus 9/26/2016     Osteopenia 2010     Osteopenia of left hip 12/12/2018     Physical exam 9/26/2016    Overview:  Full code.  Would want her  and son to make medical decisions for her if she were unable to do so.  Does not have an advanced directive.     Overview:  Diet - tries to eat a healthy diet  Exercise - \"I'm a fair weather walker\"  Active during the day Mammogram - 10/2017  Colonoscopy - 2/2008 - next in 2018  DXA - 8/2015 - next in 2018  Immunizations -  Up to date      Right internal carotid artery aneurysm 12/6/2018    5 mm     Sensorineural hearing loss 2/04 & 4/18 2/18 worsened     Tinnitus 12/2018    R ear only     White coat syndrome without diagnosis of hypertension 12/6/2018     Current Medications    Current Outpatient Medications   Medication Sig     albuterol (PROAIR HFA/PROVENTIL HFA/VENTOLIN HFA) 108 (90 Base) MCG/ACT inhaler Inhale 2 puffs into the lungs as needed     Cholecalciferol (VITAMIN D3) 1000 UNITS CAPS Take 1 capsule by mouth daily     fluocinonide (LIDEX) 0.05 % external gel Apply topically 2 times daily     omeprazole (PRILOSEC) 10 MG DR capsule Take 20 mg by mouth as needed     Psyllium (METAMUCIL FREE & NATURAL) 43 % POWD      methylcellulose (CITRUCEL) 500 MG TABS tablet  (Patient not taking: Reported on 11/19/2021)     No current facility-administered medications for this visit.     Exam:  /75 (BP Location: Left arm, Patient Position: Chair, Cuff Size: Adult Regular)   Pulse 82   Ht 1.72 m (5' 7.72\")   Wt 58.1 kg (128 lb)   SpO2 99%   BMI 19.63 kg/m    She was awake, alert, oriented x3.  She was comfortable.  She " was in no apparent distress.  She had no pallor, cyanosis or jaundice.  Her neck exam revealed no jugular venous distention.  She had no lower extremity edema.    Assessment and Plan:  In summary Ms. العلي is a very pleasant 71-year-old female with Raynaud's disease and a family history of scleroderma who was referred to us for evaluation of exertional shortness of breath of unclear etiology.    Exercise-induced heart failure with preserved ejection fraction     Based on the testing she has had so far it appears that she has exercise-induced heart failure with preserved ejection fraction.  At rest she has borderline high PA pressure with high normal wedge pressure.  During exercise her pulmonary capillary wedge pressure increases significantly.  She has significant elevation in her PA pressure out of proportion to increase in her cardiac output.  Her PVR does decrease with exercise which would suggest heart failure with preserved ejection fraction rather than exercise-induced PAH.    I discussed the diagnosis, natural history and prognosis of exercise-induced heart failure with preserved ejection fraction.  Unfortunately currently there is no targeted therapy for exercise-induced HFpEF  I discussed off label use of sildenafil 20 mg 3 times a day or sodium glucose cotransporter receptor inhibitor.  She is not interested in this which I think is very appropriate.    We will observe her clinically closely.  We discussed the importance of low-salt diet and fluid restriction.  We also discussed the importance of regular exercise.    ST segment depression in V5 and V6 on the exercise treadmill test    She has had a dobutamine stress echo last year that was unremarkable.  Her coronary calcium score was also 0 last year.  Clinically she does not have any symptoms suggestive of angina.  Thus I do not think she needs any further evaluation at this time point for this.  She knows to call us if she were to have worsening  exertional chest pain or chest pressure.    Pulmonary nodules  She was recently seen in the pulmonary nodule clinic at [facility].  They have recommended her to have a repeat CT scan of the chest in a year.  She will follow up with them.    I have recommended her to return to clinic in a year with a repeat echocardiogram and labs.  She will call us in the interim with any further worsening symptoms. It was a pleasure seeing Ms. العلي in our bone hypertension clinic at [facility].    Total time today was 35 minutes reviewing notes, imaging, labs, patient visit, orders and documentation         Sincerely,  Negrita Rees MD   [facility]  Heart Failure, Transplant, and Mechanical Circulatory Support Cardiology   Cardiovascular Division  [facility]   [phone]

## 2021-12-14 DIAGNOSIS — Z11.59 ENCOUNTER FOR SCREENING FOR OTHER VIRAL DISEASES: ICD-10-CM

## 2022-01-09 ENCOUNTER — E-VISIT (OUTPATIENT)
Dept: URGENT CARE | Facility: CLINIC | Age: 72
End: 2022-01-09
Payer: COMMERCIAL

## 2022-01-09 ENCOUNTER — TELEPHONE (OUTPATIENT)
Dept: NURSING | Facility: CLINIC | Age: 72
End: 2022-01-09

## 2022-01-09 DIAGNOSIS — Z20.822 SUSPECTED COVID-19 VIRUS INFECTION: Primary | ICD-10-CM

## 2022-01-09 PROCEDURE — 99421 OL DIG E/M SVC 5-10 MIN: CPT | Performed by: NURSE PRACTITIONER

## 2022-01-09 NOTE — PATIENT INSTRUCTIONS
Magdiel,      Based on your responses, you may have coronavirus (COVID-19). This illness can cause fever, cough and trouble breathing. Many people get a mild case and get better on their own. Some people can get very sick.    Will I be tested for COVID-19?  We would like to test you for COVID-19 virus. I have placed orders for this test.     To schedule: go to your [employer] home page and scroll down to the section that says  You have an appointment that needs to be scheduled  and click the large green button that says  Schedule Now  and follow the steps to find the next available openings.    If you are unable to complete these [employer] scheduling steps, please call [phone] to schedule your testing.     Return to work/school/ guidance:  Please let your workplace manager and staffing office know when your isolation ends.       If you receive a positive COVID-19 test result, follow the guidance of the those who are giving you the results. Usually the return to work is 10 days from symptom onset or positive test date, (or in some cases 20 days if you are immunocompromised). If your symptoms started after your positive test, the 10 days should start when your symptoms started.   o If you work at [employer], you must also be cleared by Employee Occupational Health and Safety to return to work.      If you receive a negative COVID-19 test result and did not have a high risk exposure to someone with a known positive COVID-19 test, you can return to work once you're free of fever for 24 hours without fever-reducing medication and your symptoms are improving or resolved.    If you receive a negative COVID-19 test and had a high-risk exposure to someone who has tested positive for COVID-19 then you can return to work 14 days after your last contact with the positive individual. Follow quarantine guidance given by your doctor or public health officials.     Sign up for GetWell Loop:  We know it's scary to hear  that you might have COVID-19. We want to track your symptoms to make sure you're okay over the next 2 weeks. Please look for an email from [employer]--this is a free, online program that we'll use to keep in touch. To sign up, follow the link in the email you will receive. Learn more at [email or web address]    How can I take care of myself?  Over the counter medications may help with your symptoms like congestion, cough, chills, or fever.    There are not many effective prescription treatments for early COVID-19. Hydroxychloroquine, ivermectin, and azithromycin are not effective or recommended for COVID-19.    If your symptoms started in the last 10 days, you may be able to receive a treatment with monoclonal antibodies. This treatment can lower your risk of severe illness and going to the hospital. It is given through an IV or under your skin (subcutaneous) and must be given at an infusion center. You must be 12 or older, weight at least 88 pounds, and have a positive COVID-19 test.     If you would like to sign up to be considered to receive the monoclonal antibody medicine, please complete a participation form through the [facility] here: MNRAP ([email or web address]). You may also call the [facility] COVID-19 Public Hotline at 1-303.320.1675 (open Mon-Fri: 9am-7pm and Sat: 10am-6pm).     Not all people who are eligible will receive the medicine, since supply is limited. You will be contacted in the next 1 to 2 business days only if you are selected. If you do not receive a call, you have not been selected to receive the medicine. If you have any questions about this medication, please contact your primary care provider. For more information, see [email or web address]/[email or web address]      Get lots of rest. Drink extra fluids (unless a doctor has told you not to)    Take Tylenol (acetaminophen) or ibuprofen for fever or  pain. If you have liver or kidney problems, ask your family doctor if it's okay to take Tylenol o ibuprofen    Take over the counter medications for your symptoms, as directed by your doctor. You may also talk to your pharmacist.      If you have other health problems (like cancer, heart failure, an organ transplant or severe kidney disease): Call your specialty clinic if you don't feel better in the next 2 days.    Know when to call 911. Emergency warning signs include:  o Trouble breathing or shortness of breath  o Pain or pressure in the chest that doesn't go away  o Feeling confused like you haven't felt before, or not being able to wake up  o Bluish-colored lips or face    Where can I get more information?     [employer] - About COVID-19: [email or web address]     CDC - What to Do If You're Sick:     www.cdc.gov/coronavirus/2019-ncov/about/steps-when-sick.html    CDC - Ending Home Isolation:  https://www.cdc.gov/coronavirus/2019-ncov/your-health/quarantine-isolation.html    CDC - Caring for Someone:  www.cdc.gov/coronavirus/2019-ncov/if-you-are-sick/care-for-someone.html    [facility] clinical trials (COVID-19 research studies): clinicalaffairs.[facility]-clinical-trials    Below are the COVID-19 hotlines at the [facility] of Health ([facility]). Interpreters are available.  o For health questions: Call 510-767-7396 or 1-260.637.1148 (7 a.m. to 7 p.m.)  o For questions about schools and childcare: Call 527-909-7560 or 1-132.967.3486 (7 a.m. to 7 p.m.)

## 2022-01-09 NOTE — TELEPHONE ENCOUNTER
"Telephone Call:    Pt had exposure Monday to someone with COVID-19. She did an E-visit today and has an order to get a COVID-19 test. She stated that the next available COVID-19 testing time is Wednesday, 1/12/2022.     Pt is upset with a provider writing an order for her to get tested, but not being able to get tested immediately.     Writer also reviewed the following information with patient per her request:     How can I protect others?    If you have symptoms: stay home and away from others (self-isolate) until:    You've had no fever--and no medicine that reduces fever--for 1 full day (24 hours). And       Your other symptoms have gotten better. For example, your cough or breathing has improved. And     At least 10 days have passed since your symptoms started. (If you've been told by a doctor that you have a weak immune system, wait 20 days.)     If you don't have symptoms: Stay home and away from others (self-isolate) until at least 10 days have passed since your first positive COVID-19 test. (Date test collected)      Pt hung up after stating that \"now she will never know if she has COVID-19\" because the testing is out so far.     Ofelia Love RN  [facility] Nurse Advisor 3:10 PM 1/9/2022  " Health Maintenance Due   Topic Date Due   • Pneumococcal Vaccine 0-64 (1 of 1 - PPSV23) 07/23/1996   • Depression Screening  07/23/2002   • Cervical Cancer Screening HPV CO-Testing  07/23/2020       Patient is aware she is due for the above and that her  Pneumococcal vaccine will be deferred to her primary care provider due to availability. Depression screen completed at this visit. Will plan to proceed with pap smear today. Flu was declined for the season.

## 2022-01-12 ENCOUNTER — LAB (OUTPATIENT)
Dept: FAMILY MEDICINE | Facility: CLINIC | Age: 72
End: 2022-01-12
Attending: NURSE PRACTITIONER
Payer: COMMERCIAL

## 2022-01-12 DIAGNOSIS — Z20.822 SUSPECTED COVID-19 VIRUS INFECTION: ICD-10-CM

## 2022-01-12 PROCEDURE — U0005 INFEC AGEN DETEC AMPLI PROBE: HCPCS

## 2022-01-12 PROCEDURE — U0003 INFECTIOUS AGENT DETECTION BY NUCLEIC ACID (DNA OR RNA); SEVERE ACUTE RESPIRATORY SYNDROME CORONAVIRUS 2 (SARS-COV-2) (CORONAVIRUS DISEASE [COVID-19]), AMPLIFIED PROBE TECHNIQUE, MAKING USE OF HIGH THROUGHPUT TECHNOLOGIES AS DESCRIBED BY CMS-2020-01-R: HCPCS

## 2022-01-13 LAB — SARS-COV-2 RNA RESP QL NAA+PROBE: NEGATIVE

## 2022-03-02 ENCOUNTER — ANCILLARY PROCEDURE (OUTPATIENT)
Dept: CARDIOLOGY | Facility: CLINIC | Age: 72
End: 2022-03-02
Attending: INTERNAL MEDICINE
Payer: COMMERCIAL

## 2022-03-02 DIAGNOSIS — R00.2 PALPITATIONS: ICD-10-CM

## 2022-03-02 DIAGNOSIS — I47.10 SVT (SUPRAVENTRICULAR TACHYCARDIA) (H): ICD-10-CM

## 2022-03-02 PROCEDURE — 93242 EXT ECG>48HR<7D RECORDING: CPT

## 2022-03-02 PROCEDURE — 93244 EXT ECG>48HR<7D REV&INTERPJ: CPT | Performed by: INTERNAL MEDICINE

## 2022-03-02 NOTE — PROGRESS NOTES
"Per Dr. Rees, patient to have 7 day Zio monitor placed.  Diagnosis: SVT  Monitor placed: {YES / NO:655682::\"Yes\"}  Patient Instructed: {YES / NO:039922::\"Yes\"}  Patient verbalized understanding: {YES / NO:410375::\"Yes\"}  Holter #[id number]  Siddharth Sahu"

## 2022-03-14 ENCOUNTER — TELEPHONE (OUTPATIENT)
Dept: ENDOCRINOLOGY | Facility: CLINIC | Age: 72
End: 2022-03-14
Payer: COMMERCIAL

## 2022-03-14 DIAGNOSIS — M81.0 AGE-RELATED OSTEOPOROSIS WITHOUT CURRENT PATHOLOGICAL FRACTURE: Primary | ICD-10-CM

## 2022-03-14 DIAGNOSIS — K21.9 GASTROESOPHAGEAL REFLUX DISEASE, UNSPECIFIED WHETHER ESOPHAGITIS PRESENT: ICD-10-CM

## 2022-03-14 RX ORDER — METHYLPREDNISOLONE SODIUM SUCCINATE 125 MG/2ML
125 INJECTION, POWDER, LYOPHILIZED, FOR SOLUTION INTRAMUSCULAR; INTRAVENOUS
Status: CANCELLED
Start: 2022-04-04

## 2022-03-14 RX ORDER — EPINEPHRINE 1 MG/ML
0.3 INJECTION, SOLUTION, CONCENTRATE INTRAVENOUS EVERY 5 MIN PRN
Status: CANCELLED | OUTPATIENT
Start: 2022-04-04

## 2022-03-14 RX ORDER — DIPHENHYDRAMINE HYDROCHLORIDE 50 MG/ML
50 INJECTION INTRAMUSCULAR; INTRAVENOUS
Status: CANCELLED
Start: 2022-04-04

## 2022-03-14 RX ORDER — ZOLEDRONIC ACID 5 MG/100ML
5 INJECTION, SOLUTION INTRAVENOUS ONCE
Status: CANCELLED
Start: 2022-04-04 | End: 2022-04-04

## 2022-03-14 RX ORDER — MEPERIDINE HYDROCHLORIDE 25 MG/ML
25 INJECTION INTRAMUSCULAR; INTRAVENOUS; SUBCUTANEOUS EVERY 30 MIN PRN
Status: CANCELLED | OUTPATIENT
Start: 2022-04-04

## 2022-03-14 RX ORDER — ALBUTEROL SULFATE 90 UG/1
1-2 AEROSOL, METERED RESPIRATORY (INHALATION)
Status: CANCELLED
Start: 2022-04-04

## 2022-03-14 RX ORDER — ALBUTEROL SULFATE 0.83 MG/ML
2.5 SOLUTION RESPIRATORY (INHALATION)
Status: CANCELLED | OUTPATIENT
Start: 2022-04-04

## 2022-03-14 RX ORDER — HEPARIN SODIUM (PORCINE) LOCK FLUSH IV SOLN 100 UNIT/ML 100 UNIT/ML
5 SOLUTION INTRAVENOUS
Status: CANCELLED | OUTPATIENT
Start: 2022-04-04

## 2022-03-14 RX ORDER — HEPARIN SODIUM,PORCINE 10 UNIT/ML
5 VIAL (ML) INTRAVENOUS
Status: CANCELLED | OUTPATIENT
Start: 2022-04-04

## 2022-03-14 RX ORDER — NALOXONE HYDROCHLORIDE 0.4 MG/ML
0.2 INJECTION, SOLUTION INTRAMUSCULAR; INTRAVENOUS; SUBCUTANEOUS
Status: CANCELLED | OUTPATIENT
Start: 2022-04-04

## 2022-03-14 NOTE — TELEPHONE ENCOUNTER
[facility] Call Center    Phone Message    May a detailed message be left on voicemail: yes     Reason for Call: Order(s): Other:   Reason for requested: reclast orders  Date needed: ASAP- Pt usually completes in April  Provider name: Magdalena    Pt scheduled next available appt in 8/22.  PT would like orders for infusion to complete in April with DEXA.  Please place orders and send pt a mychart to confirm next steps.        Action Taken: Message routed to:  [facility] endo    Travel Screening: Not Applicable

## 2022-03-16 DIAGNOSIS — Z20.822 ENCOUNTER FOR LABORATORY TESTING FOR COVID-19 VIRUS: Primary | ICD-10-CM

## 2022-03-23 ENCOUNTER — TELEPHONE (OUTPATIENT)
Dept: CARDIOLOGY | Facility: CLINIC | Age: 72
End: 2022-03-23
Payer: COMMERCIAL

## 2022-03-23 NOTE — TELEPHONE ENCOUNTER
Patient would like an EP referral; does not want to start medication at this time. Referral message sent to RNs. Enedina Ren RN on 3/23/2022 at 9:25 AM    ----- Message from Negrita Rees MD sent at 3/23/2022  9:15 AM CDT -----  Regarding: RE: Gricelda Johnston,    She has SVT's, which are usually benign and not concerning.     Two options:    1. Low dose metoprolol 25 mg daily - can help with palpitations but can make her a little tired. I think this is the best option.    2. EP consult     I am ok with either one she prefers. I would appreciate it if you could call her     Thank you    Sincerely,  Negrita  ----- Message -----  From: Enedina Ren RN  Sent: 3/22/2022   2:53 PM CDT  To: Negrita Rees MD  Subject: FW: Gricelda                                          When able, can you take a look at her Zio results? Does she need an EP referral?    [place]  ----- Message -----  From: Kelley Monterroso RN  Sent: 3/21/2022  12:00 AM CDT  To: Kelley Monterroso RN  Subject: Zio                                              Patient mailed back Zio 3/9, look for results.

## 2022-04-13 DIAGNOSIS — M81.0 AGE-RELATED OSTEOPOROSIS WITHOUT CURRENT PATHOLOGICAL FRACTURE: Primary | ICD-10-CM

## 2022-04-19 ENCOUNTER — IMMUNIZATION (OUTPATIENT)
Dept: NURSING | Facility: CLINIC | Age: 72
End: 2022-04-19
Payer: COMMERCIAL

## 2022-04-19 PROCEDURE — 91306 COVID-19,PF,MODERNA (18+ YRS BOOSTER .25ML): CPT

## 2022-04-19 PROCEDURE — 0064A COVID-19,PF,MODERNA (18+ YRS BOOSTER .25ML): CPT

## 2022-04-27 ENCOUNTER — OFFICE VISIT (OUTPATIENT)
Dept: CARDIOLOGY | Facility: CLINIC | Age: 72
End: 2022-04-27
Attending: INTERNAL MEDICINE
Payer: COMMERCIAL

## 2022-04-27 VITALS
HEART RATE: 78 BPM | OXYGEN SATURATION: 100 % | BODY MASS INDEX: 18.9 KG/M2 | DIASTOLIC BLOOD PRESSURE: 81 MMHG | SYSTOLIC BLOOD PRESSURE: 134 MMHG | WEIGHT: 123.3 LBS

## 2022-04-27 DIAGNOSIS — I47.10 SVT (SUPRAVENTRICULAR TACHYCARDIA) (H): Primary | ICD-10-CM

## 2022-04-27 PROCEDURE — G0463 HOSPITAL OUTPT CLINIC VISIT: HCPCS | Mod: 25

## 2022-04-27 PROCEDURE — 99204 OFFICE O/P NEW MOD 45 MIN: CPT | Performed by: INTERNAL MEDICINE

## 2022-04-27 PROCEDURE — 93005 ELECTROCARDIOGRAM TRACING: CPT

## 2022-04-27 ASSESSMENT — PAIN SCALES - GENERAL: PAINLEVEL: NO PAIN (0)

## 2022-04-27 NOTE — NURSING NOTE
Chief Complaint   Patient presents with     New Patient     SVT on zio per Dr Rees   Vitals were taken, medications reconciled, and EKG was performed.    Aamir Gonzalez, EMT  9:17 AM

## 2022-04-27 NOTE — LETTER
"4/27/2022      RE: Sarah العلي  [address]  [place] [address]       Dear Colleague,    Thank you for the opportunity to participate in the care of your patient, Sarah العلي, at the [facility] [place] [facility]. Please see a copy of my visit note below.        ELECTROPHYSIOLOGY CLINIC VISIT    Assessment/Recommendations   Assessment/Plan:    Ms. العلي is a 71 year old female who has a past medical history significant for PSVT, Raynaud's, BPPV, cerebral aneurysm, GERD, and Raynaud's disease. She has exertional shortness of breath and underwent an extensive evaluation for this. Followed by Dr. Rees. .A zio patch monitor from 3/2/22-3/9/22 showed 217 PSVT up to 17.2 seconds and 6.2% PAC burden. She was referred to EP because of her heart monitor results. She reports feeling palpitations and \"extra beats\" daily lasting seconds and particularly more noticeable at nightime. She has some dizzy spells that occur twice a year or so, often while bending over or after sitting for a longtime.    Supraventricular Tachycardia:   We discussed various options for treatment of SVT. This is not a life threatening arrhythmia. Treatment is indicated primarily for relief of symptoms. Medications such as calcium channel blockers or beta blockers in some cases reduce the frequency and duration of the episodes but they will not cure it. The other option discussed was an electrophysiology study (EPS) and possible ablation. Success rate of ablation 80-90% depending on the mechanism. For now, she feels her palpitations are not very bothersome to her and would opt for conservative measures at this time.     Follow up with EP on an as needed basis.        History of Present Illness/Subjective    Ms. Sarah العلي is a 71 year old female who comes in today for EP consultation of PSVT on zio patch.    Ms. العلي is a 71 year old female who has a " "past medical history significant for PSVT, Raynaud's, BPPV, cerebral aneurysm, GERD, and Raynaud's disease.     She has exertional shortness of breath and underwent an extensive evaluation for this. Her CT dual-energy pulmonary angiogram showed no evidence of chronic thromboembolic disease.  She had no parenchymal abnormalities.  She had multiple solitary nodules.She had a cardiopulmonary exercise testing.  She exercised for 7 minutes and 32 seconds.  She did not achieve anaerobic threshold.  Her RER was 0.94.  Her VO2 was 19 mL/kg/min with was 82% age of 18 gender predicted.  Her VE VCO2 slope was normal at 25.7.  She had appropriate blood pressure and heart rate response.  She did not encroach upon her breathing reserve.  Her breathing reserve at peak exercise was normal at 61. Her NT proBNP was normal.  Her CBC chemistry and liver function tests were unremarkable.A zio patch monitor from 3/2/22-3/9/22 showed 217 PSVT up to 17.2 seconds and 6.2% PAC burden. She was referred to EP because of her heart monitor results.      She reports feeling palpitations and \"extra beats\" daily lasting seconds and particularly more noticeable at nightime. She has some dizzy spells that occur twice a year or so, often while bending over or after sitting for a longtime. She denies chest discomfort,  abdominal fullness/bloating or peripheral edema, paroxysmal nocturnal dyspnea, orthopnea, pre-syncope, or syncope. Presenting 12 lead ECG shows NSR with PAC Vent Rate 81 bpm,  ms, QRS 76 ms, QTc 422 ms. No current cardiac medications.       I have reviewed and updated the patient's Past Medical History, Social History, Family History and Medication List.     Cardiographics (Personally Reviewed) :   9/3/21 Echo:   Interpretation Summary     1. The left ventricle is normal in structure, function and size. The visual  ejection fraction is estimated at 60%.  2. The right ventricle is normal in structure, function and size.  3. There is " mild to moderate (1-2+) tricuspid regurgitation. The right  ventricular systolic pressure is approximated at 35mmHg plus the right atrial  pressure.  4. There is mild to moderate (1-2+) aortic regurgitation.     Stress echo from 9/2020 showed EF 55%, 1+ AI, 1+ TR, RVSP 43mmHg.    11/10/21 RHC:    Right sided filling pressures are normal.    Borderlinepulmonary hypertension at rest    Left sided filling pressures are normal.    Normal cardiac output level.    Signficant increase in PCWP and PA pressure out of proportion to the increase in flow consistent with Exercise induced HFpEF     At rest pt has normal left and right sided pressure with mildly elevated pulmonary artery pressures. At exercise pt developed moderately elevated left sided pressures with moderately elevated pulmonary artery pressures. Findings indicative of Heart failure with preserved ejection fraction contributing to symptoms and elevated pulmonary artery pressures.          Physical Examination   /81 (BP Location: Left arm, Patient Position: Sitting, Cuff Size: Adult Small)   Pulse 78   Wt 55.9 kg (123 lb 4.8 oz)   SpO2 100%   BMI 18.90 kg/m    Wt Readings from Last 3 Encounters:   11/19/21 58.1 kg (128 lb)   11/10/21 56.7 kg (125 lb)   10/25/21 57.3 kg (126 lb 4.8 oz)     General Appearance:   Alert, well-appearing and in no acute distress.   HEENT: Atraumatic, normocephalic. PERRL.  MMM.   Chest/Lungs:   Respirations unlabored.  Lungs are clear to auscultation.   Cardiovascular:   Regular rate and rhythm.  S1/S2. No murmur.    Abdomen:  Soft, nontender, nondistended.   Extremities: No cyanosis or clubbing. No edema.    Musculoskeletal: Moves all extremities.  Gait normal.   Skin: Warm, dry, intact.    Neurologic: Mood and affect are appropriate.  Alert and oriented to person, place, time, and situation.          Medications  Allergies   Current Outpatient Medications   Medication Sig Dispense Refill     albuterol (PROAIR HFA/PROVENTIL  HFA/VENTOLIN HFA) 108 (90 Base) MCG/ACT inhaler Inhale 2 puffs into the lungs as needed 8.5 g 3     Cholecalciferol (VITAMIN D3) 1000 UNITS CAPS Take 1 capsule by mouth daily       fluocinonide (LIDEX) 0.05 % external gel Apply topically 2 times daily 15 g 3     methylcellulose (CITRUCEL) 500 MG TABS tablet  (Patient not taking: Reported on 11/19/2021)       omeprazole (PRILOSEC) 10 MG DR capsule Take 20 mg by mouth as needed       Psyllium (METAMUCIL FREE & NATURAL) 43 % POWD       Allergies   Allergen Reactions     Atorvastatin Rash     Nickel Rash     Other reaction(s): *Unknown  Other reaction(s): *Unknown         Lab Results (Personally Reviewed)    Chemistry/lipid CBC Cardiac Enzymes/BNP/TSH/INR   Lab Results   Component Value Date    BUN 14 11/10/2021     11/10/2021    CO2 25 11/10/2021     Creatinine   Date Value Ref Range Status   11/10/2021 0.70 0.52 - 1.04 mg/dL Final   04/06/2021 0.78 0.52 - 1.04 mg/dL Final       Lab Results   Component Value Date    CHOL 216 (H) 09/18/2020    HDL 87 09/18/2020     (H) 09/18/2020      Lab Results   Component Value Date    WBC 4.6 11/10/2021    HGB 12.3 11/10/2021    HCT 41.4 11/10/2021    MCV 94 11/10/2021     11/10/2021    Lab Results   Component Value Date    INR 1.03 11/10/2021        The patient states understanding and is agreeable with the plan.   Edmundo Ro MD [facility]  Cardiology - Electrophysiology      Total time spent on patient visit, reviewing notes, imaging, labs, orders, and completing necessary documentation: 45 minutes.                Please do not hesitate to contact me if you have any questions/concerns.     Sincerely,     Edmundo Ro MD

## 2022-04-27 NOTE — PROGRESS NOTES
"    ELECTROPHYSIOLOGY CLINIC VISIT    Assessment/Recommendations   Assessment/Plan:    Ms. العلي is a 71 year old female who has a past medical history significant for PSVT, Raynaud's, BPPV, cerebral aneurysm, GERD, and Raynaud's disease. She has exertional shortness of breath and underwent an extensive evaluation for this. Followed by Dr. Rees. .A zio patch monitor from 3/2/22-3/9/22 showed 217 PSVT up to 17.2 seconds and 6.2% PAC burden. She was referred to EP because of her heart monitor results. She reports feeling palpitations and \"extra beats\" daily lasting seconds and particularly more noticeable at nightime. She has some dizzy spells that occur twice a year or so, often while bending over or after sitting for a longtime.    Supraventricular Tachycardia:   We discussed various options for treatment of SVT. This is not a life threatening arrhythmia. Treatment is indicated primarily for relief of symptoms. Medications such as calcium channel blockers or beta blockers in some cases reduce the frequency and duration of the episodes but they will not cure it. The other option discussed was an electrophysiology study (EPS) and possible ablation. Success rate of ablation 80-90% depending on the mechanism. For now, she feels her palpitations are not very bothersome to her and would opt for conservative measures at this time.     Follow up with EP on an as needed basis.        History of Present Illness/Subjective    Ms. Sarah العلي is a 71 year old female who comes in today for EP consultation of PSVT on zio patch.    Ms. العلي is a 71 year old female who has a past medical history significant for PSVT, Raynaud's, BPPV, cerebral aneurysm, GERD, and Raynaud's disease.     She has exertional shortness of breath and underwent an extensive evaluation for this. Her CT dual-energy pulmonary angiogram showed no evidence of chronic thromboembolic disease.  She had no parenchymal abnormalities.  She had multiple " "solitary nodules.She had a cardiopulmonary exercise testing.  She exercised for 7 minutes and 32 seconds.  She did not achieve anaerobic threshold.  Her RER was 0.94.  Her VO2 was 19 mL/kg/min with was 82% age of 18 gender predicted.  Her VE VCO2 slope was normal at 25.7.  She had appropriate blood pressure and heart rate response.  She did not encroach upon her breathing reserve.  Her breathing reserve at peak exercise was normal at 61. Her NT proBNP was normal.  Her CBC chemistry and liver function tests were unremarkable.A zio patch monitor from 3/2/22-3/9/22 showed 217 PSVT up to 17.2 seconds and 6.2% PAC burden. She was referred to EP because of her heart monitor results.      She reports feeling palpitations and \"extra beats\" daily lasting seconds and particularly more noticeable at nightime. She has some dizzy spells that occur twice a year or so, often while bending over or after sitting for a longtime. She denies chest discomfort,  abdominal fullness/bloating or peripheral edema, paroxysmal nocturnal dyspnea, orthopnea, pre-syncope, or syncope. Presenting 12 lead ECG shows NSR with PAC Vent Rate 81 bpm,  ms, QRS 76 ms, QTc 422 ms. No current cardiac medications.       I have reviewed and updated the patient's Past Medical History, Social History, Family History and Medication List.     Cardiographics (Personally Reviewed) :   9/3/21 Echo:   Interpretation Summary     1. The left ventricle is normal in structure, function and size. The visual  ejection fraction is estimated at 60%.  2. The right ventricle is normal in structure, function and size.  3. There is mild to moderate (1-2+) tricuspid regurgitation. The right  ventricular systolic pressure is approximated at 35mmHg plus the right atrial  pressure.  4. There is mild to moderate (1-2+) aortic regurgitation.     Stress echo from 9/2020 showed EF 55%, 1+ AI, 1+ TR, RVSP 43mmHg.    11/10/21 RHC:    Right sided filling pressures are " normal.    Borderlinepulmonary hypertension at rest    Left sided filling pressures are normal.    Normal cardiac output level.    Signficant increase in PCWP and PA pressure out of proportion to the increase in flow consistent with Exercise induced HFpEF     At rest pt has normal left and right sided pressure with mildly elevated pulmonary artery pressures. At exercise pt developed moderately elevated left sided pressures with moderately elevated pulmonary artery pressures. Findings indicative of Heart failure with preserved ejection fraction contributing to symptoms and elevated pulmonary artery pressures.          Physical Examination   /81 (BP Location: Left arm, Patient Position: Sitting, Cuff Size: Adult Small)   Pulse 78   Wt 55.9 kg (123 lb 4.8 oz)   SpO2 100%   BMI 18.90 kg/m    Wt Readings from Last 3 Encounters:   11/19/21 58.1 kg (128 lb)   11/10/21 56.7 kg (125 lb)   10/25/21 57.3 kg (126 lb 4.8 oz)     General Appearance:   Alert, well-appearing and in no acute distress.   HEENT: Atraumatic, normocephalic. PERRL.  MMM.   Chest/Lungs:   Respirations unlabored.  Lungs are clear to auscultation.   Cardiovascular:   Regular rate and rhythm.  S1/S2. No murmur.    Abdomen:  Soft, nontender, nondistended.   Extremities: No cyanosis or clubbing. No edema.    Musculoskeletal: Moves all extremities.  Gait normal.   Skin: Warm, dry, intact.    Neurologic: Mood and affect are appropriate.  Alert and oriented to person, place, time, and situation.          Medications  Allergies   Current Outpatient Medications   Medication Sig Dispense Refill     albuterol (PROAIR HFA/PROVENTIL HFA/VENTOLIN HFA) 108 (90 Base) MCG/ACT inhaler Inhale 2 puffs into the lungs as needed 8.5 g 3     Cholecalciferol (VITAMIN D3) 1000 UNITS CAPS Take 1 capsule by mouth daily       fluocinonide (LIDEX) 0.05 % external gel Apply topically 2 times daily 15 g 3     methylcellulose (CITRUCEL) 500 MG TABS tablet  (Patient not taking:  Reported on 11/19/2021)       omeprazole (PRILOSEC) 10 MG DR capsule Take 20 mg by mouth as needed       Psyllium (METAMUCIL FREE & NATURAL) 43 % POWD       Allergies   Allergen Reactions     Atorvastatin Rash     Nickel Rash     Other reaction(s): *Unknown  Other reaction(s): *Unknown         Lab Results (Personally Reviewed)    Chemistry/lipid CBC Cardiac Enzymes/BNP/TSH/INR   Lab Results   Component Value Date    BUN 14 11/10/2021     11/10/2021    CO2 25 11/10/2021     Creatinine   Date Value Ref Range Status   11/10/2021 0.70 0.52 - 1.04 mg/dL Final   04/06/2021 0.78 0.52 - 1.04 mg/dL Final       Lab Results   Component Value Date    CHOL 216 (H) 09/18/2020    HDL 87 09/18/2020     (H) 09/18/2020      Lab Results   Component Value Date    WBC 4.6 11/10/2021    HGB 12.3 11/10/2021    HCT 41.4 11/10/2021    MCV 94 11/10/2021     11/10/2021    Lab Results   Component Value Date    INR 1.03 11/10/2021        The patient states understanding and is agreeable with the plan.   Edmundo Ro MD [facility]  Cardiology - Electrophysiology      Total time spent on patient visit, reviewing notes, imaging, labs, orders, and completing necessary documentation: 45 minutes.

## 2022-04-27 NOTE — PATIENT INSTRUCTIONS
Plan:   Follow-up as needed      Your Care Team:  EP Cardiology   Telephone Number     Savana Gordon RN [phone]     For scheduling appts or procedures:    Sheila Eckert   [phone]   For the Device Clinic (Pacemakers, ICDs, Loop Recorders)    During business hours: [phone]  After business hours:   [phone]- select option 4 and ask for job code [phone].       Cardiovascular Clinic:   [address] [place] [address]. [place] [address]      As always, Thank you for trusting us with your health care needs!

## 2022-04-28 LAB
ATRIAL RATE - MUSE: 81 BPM
DIASTOLIC BLOOD PRESSURE - MUSE: NORMAL MMHG
INTERPRETATION ECG - MUSE: NORMAL
P AXIS - MUSE: 81 DEGREES
PR INTERVAL - MUSE: 150 MS
QRS DURATION - MUSE: 76 MS
QT - MUSE: 364 MS
QTC - MUSE: 422 MS
R AXIS - MUSE: 61 DEGREES
SYSTOLIC BLOOD PRESSURE - MUSE: NORMAL MMHG
T AXIS - MUSE: 52 DEGREES
VENTRICULAR RATE- MUSE: 81 BPM

## 2022-06-11 ENCOUNTER — HEALTH MAINTENANCE LETTER (OUTPATIENT)
Age: 72
End: 2022-06-11

## 2022-06-11 ASSESSMENT — ENCOUNTER SYMPTOMS
LIGHT-HEADEDNESS: 0
ALTERED TEMPERATURE REGULATION: 0
WEIGHT LOSS: 1
LEG PAIN: 0
HALLUCINATIONS: 0
CHILLS: 0
WEIGHT GAIN: 0
INCREASED ENERGY: 0
VOMITING: 0
BLOOD IN STOOL: 0
DYSURIA: 0
FEVER: 0
ORTHOPNEA: 0
PALPITATIONS: 1
TASTE DISTURBANCE: 0
JAUNDICE: 0
HEMATURIA: 0
NAUSEA: 0
HYPERTENSION: 0
ABDOMINAL PAIN: 0
FATIGUE: 0
NECK MASS: 0
SINUS CONGESTION: 1
SLEEP DISTURBANCES DUE TO BREATHING: 0
SMELL DISTURBANCE: 0
DIFFICULTY URINATING: 0
RECTAL PAIN: 0
FLANK PAIN: 0
HYPOTENSION: 0
DIARRHEA: 0
NIGHT SWEATS: 0
HEARTBURN: 1
EXERCISE INTOLERANCE: 0
BLOATING: 0
CONSTIPATION: 0
POLYDIPSIA: 0
SYNCOPE: 0
DECREASED APPETITE: 0

## 2022-06-11 ASSESSMENT — ACTIVITIES OF DAILY LIVING (ADL)
IN_THE_PAST_7_DAYS,_DID_YOU_NEED_HELP_FROM_OTHERS_TO_PERFORM_EVERYDAY_ACTIVITIES_SUCH_AS_EATING,_GETTING_DRESSED,_GROOMING,_BATHING,_WALKING,_OR_USING_THE_TOILET: N
IN_THE_PAST_7_DAYS,_DID_YOU_NEED_HELP_FROM_OTHERS_TO_TAKE_CARE_OF_THINGS_SUCH_AS_LAUNDRY_AND_HOUSEKEEPING,_BANKING,_SHOPPING,_USING_THE_TELEPHONE,_FOOD_PREPARATION,_TRANSPORTATION,_OR_TAKING_YOUR_OWN_MEDICATIONS?: N

## 2022-06-22 ENCOUNTER — OFFICE VISIT (OUTPATIENT)
Dept: INTERNAL MEDICINE | Facility: CLINIC | Age: 72
End: 2022-06-22
Payer: COMMERCIAL

## 2022-06-22 VITALS
DIASTOLIC BLOOD PRESSURE: 73 MMHG | TEMPERATURE: 98 F | WEIGHT: 123.6 LBS | HEART RATE: 69 BPM | SYSTOLIC BLOOD PRESSURE: 130 MMHG | BODY MASS INDEX: 18.73 KG/M2 | HEIGHT: 68 IN | RESPIRATION RATE: 12 BRPM | OXYGEN SATURATION: 98 %

## 2022-06-22 DIAGNOSIS — Z12.31 ENCOUNTER FOR SCREENING MAMMOGRAM FOR BREAST CANCER: ICD-10-CM

## 2022-06-22 DIAGNOSIS — J34.89 NASAL OBSTRUCTION: Primary | ICD-10-CM

## 2022-06-22 PROCEDURE — 99397 PER PM REEVAL EST PAT 65+ YR: CPT | Performed by: INTERNAL MEDICINE

## 2022-06-22 ASSESSMENT — PAIN SCALES - GENERAL: PAINLEVEL: NO PAIN (0)

## 2022-06-22 NOTE — NURSING NOTE
Chief Complaint   Patient presents with     Physical     Patient comes in for a physical exam.         Alberto Grey MA on 6/22/2022 at 2:01 PM

## 2022-06-22 NOTE — PATIENT INSTRUCTIONS
Your cholesterol panel from last year looks good so let's repeat that with your next physical.  Meanwhile, keep your appointments with Cardiology and I will see you back in one year for a routine physical (sooner as needed).  It was a pleasure to see you in clinic!  Dr. Ivory

## 2022-06-23 NOTE — PROGRESS NOTES
"Sarah is a very pleasant 71 year old female who was seen today for physical.  She does have a new symptom of nasal obstruction on the left side, which seems to be getting worse over time; has difficulty breathing in or out on that side, worse at night, feels as though there is either narrowing of the nasal passage or some congestion or other mechanical blockage.  ENT referral was offered.    We also discussed the finding of mild pulmonary HTN on the echo and also right heart cath from last year as well as SVT on monitoring.  She will be followed by Cardiology and also saw Pulmonology; I reviewed these notes.  Currently, she is not on any specific treatment.  Her exercise tolerance is stable.   She denies exertional chest pain, but does get fairly severe GERD that occurs rarely and seems to be related to certain foods and/or lying down after a large meal.  We discussed using Pepcid complete prn, and if it became more frequent, could consider daily PPI such as protonix.    She endorses mild weight loss and I confirmed that her weight is down about 8 pounds compared to 2018.  It is more stable recently, will monitor going forward.  It is possible that with inactivity during covid she also lost muscle mass.    In terms of health care maintenance, she is due for the mammogram, and also shingles vaccine (advised to obtain at local pharmacy) otherwise she is up to date.    No changes to past, family or social history, and  ROS: 10 point ROS neg other than the symptoms noted above in the HPI.    PHYSICAL EXAM:  /73   Pulse 69   Temp 98  F (36.7  C) (Oral)   Resp 12   Ht 1.72 m (5' 7.72\")   Wt 56.1 kg (123 lb 9.6 oz)   SpO2 98%   BMI 18.95 kg/m    Constitutional: no distress, comfortable, pleasant   Eyes: anicteric, normal extra-ocular movements   Ears, Nose and Throat: tympanic membranes clear, neck supple with full range of motion, no thyromegaly.   Cardiovascular: regular rate and rhythm, normal S1 and S2, no " murmurs, rubs or gallops, peripheral pulses full and symmetric   Respiratory: clear to auscultation, no wheezes or crackles, normal breath sounds   Gastrointestinal: positive bowel sounds, nontender, no hepatosplenomegaly, no masses   Musculoskeletal: knees full range of motion, no effusion  Skin: no concerning lesions, no jaundice   Neurological: normal gait, no tremor   Psychological: appropriate mood   Lymphatic: no cervical lymphadenopathy and no pedal edema    A/P 71 year old here for physical exam and f/u    Nasal obstruction  -     Adult ENT  Referral; Future    Encounter for screening mammogram for breast cancer  -     Mammogram, screening w sharlene (3D); Future    Continue f/u with Cardiology and RTC for routine physical in one year.    Kelley Hernandez MD

## 2022-06-27 NOTE — TELEPHONE ENCOUNTER
FUTURE VISIT INFORMATION      FUTURE VISIT INFORMATION:    Date: 9/26/22    Time: 10:30AM    Location: [facility]  REFERRAL INFORMATION:    Referring provider:  Kelley Alves MD    Referring providers clinic:  [facility]     Reason for visit/diagnosis  NEW// Sched per pt // Ref by : Dr Cachorro Hernandez // Dx: Nasal Obtruction // [facility] location verified    RECORDS REQUESTED FROM:       Clinic name Comments Records Status Imaging Status    [facility]  6/22/22 note and referral from Kelley Alves MD [facility]    Imaging  1/26/19 CT Temporal bone [facility] PACS   [facility] [place]  2/10/2020 note from Dr Calix and Perlita Alcantar SLP  3/26/19 note from Dr Nissen   1/26/19 note from Dr Patel  EPIC    [facility]  10/5/21 MR BRAIN  12/19/19 MR Brain   12/10/18 MR Brain  Care [facility]  req 8/20/22 - PACS                  8/20/22 9:48AM sent a fax to [facility] for images - [facility]   8/30/22 9:18PM images received in PACS - [facility]

## 2022-07-11 ENCOUNTER — TELEPHONE (OUTPATIENT)
Dept: INTERNAL MEDICINE | Facility: CLINIC | Age: 72
End: 2022-07-11

## 2022-07-11 ENCOUNTER — TELEPHONE (OUTPATIENT)
Dept: CARDIOLOGY | Facility: CLINIC | Age: 72
End: 2022-07-11

## 2022-07-11 NOTE — TELEPHONE ENCOUNTER
Unable to reach patient; LM with direct number for callback with questions regarding COVID. Enedina Ren RN on 7/11/2022 at 9:52 AM    Spoke with patient regarding Paxlovid. Advised that because she is not on any PH medication, she can reach out to her PCP to receive treatment if she contracts COVID. She currently does not but was curious regarding treatment. Enedina Ren RN on 7/11/2022 at 10:11 AM       Health Call Center    Phone Message    May a detailed message be left on voicemail: yes     Reason for Call: Other: Pt would like a call back to discuss covid and what the plan would be if she got covid as it has ramped up again , she is a little concerned, Please reach out to discuss     Action Taken: Message routed to:  [facility] ([facility] Cardio    Travel Screening: Not Applicable

## 2022-07-11 NOTE — TELEPHONE ENCOUNTER
[facility] Call Center    Phone Message    May a detailed message be left on voicemail: yes     Reason for Call: Other: Patient is high risk for COVID complications if she were to have COVID. Last saw Dr. Coronel at 06/22/2022 physical. Forgot to ask this at the visit. Patient wants to know if she were to test positive, does Dr. Cachorro Hernandez prescribe paxlovid and viral treatments. Patient wants to be prepared in case she gets COVID.      Informed patient that 5 day timeframe from onset of symptoms if important window if she wants viral treatments since she will need a video visit to be prescribed meds. Informed patient that she can request virtual visits in [facility] or call [phone] as well if she needs sooner times than are available in [facility].    Wanting a call back from the care team to discuss further.      Action Taken: Message routed to:  [facility]): [facility]    Travel Screening: Not Applicable

## 2022-07-26 NOTE — PROGRESS NOTES
ASSESSMENT:  This is a 72-year-old postmenopausal female who has osteoporosis by T-scores.  Her FRAX puts her at high risk for hip fracture.  She has also several other risk factors.  We discussed calcium and vitamin D.  We will get blood tests to check her bone metabolism.  We discussed different medications at length.  She also has bone loss in her jaw.  I would recommend an anabolic medication which would promote bone deposition.  I suggested either Forteo or Tymlos.  And then we would follow this with Prolia.  She will get back with me and let me know what she would prefer.  Her DEXA will be due in April 2023 but will get a DXA 1 year after starting medication.     PLAN:  Blood work today  Patient should take 1200mg of calcium/day and vitamin D3 1000IU/day.  Dietary calcium is best  1 glass of milk is 300mg calcium  Consider forteo (daily injections for 24 months) or tymlos (daily injections for 18 months)  Let me know and we will start the PA with your insurance  See me 6 months after starting the medication       Thank you for allowing me to participate in the care of your patient.  Please do not hesitate to call with questions or concerns.    Sincerely,  Evelyn Hightower MD, PhD    [facility] Dr Ivory     Time note ((n4, 45'): The total time (on the date of service) for this service was 50  minutes, including discussion/face-to-face, chart review, interpretation not otherwise reported, documentation, and updating of the computerized record.    I reviewed the DXA myself.  Evelyn Hightower MD, PhD            Sarah is a  71 year old female /post menopausal] [GR2, [id number]] that presents today for osteoporosis consult.   Referring Physician: Referred Dr Ivory    HPI     Have you ever had a bone density test? Yes  Where = [facility]  When = 4/2021 2020 2018   Spine Tscore = -0.5  LOSS -2.8%  Left neck Tscore = -2.5  Total left hip Tscore = -1.6  Right neck Tscore = -2.0  Total Right hip Tscore = -1.0  -  GAIN  "+2.9%  Have you received any x-ray dye or contrast in the last ten days? No  How many servings of dairy products do you consume per day? 2-3 Type: milk, cheese, yogurt, cottage cheese, greens   Do you take a multi-vitamin daily? No  Do you take a vitamin D supplement? Yes 1000IU/day   Do you take a calcium supplement daily? No causes constipation   Do you take a supplement containing strontium? No  Are you exposed to natural sunlight at least 20 minutes three times a week? Yes  Have you broken any bones during your adult life? No  How tall were you at age 25? 67\"  Have you gone through menopause? Yes  Did your menopause occur before age 45? No  Have you taken hormone therapy? Yes. Details: for about 1 year  20 yrs ago   No radiation history     Social History   reports that she quit smoking about 42 years ago. Her smoking use included cigarettes. She started smoking about 53 years ago. She has a 6.00 pack-year smoking history. She has never used smokeless tobacco. She reports previous alcohol use of about 1.0 - 2.0 standard drink of alcohol per week. She reports that she does not use drugs.  Do you smoke cigarettes? Reformed smoker  Do you exercise? Yes. Details: 3x/wk - 30-45 minutes  Do you drink alcohol? Yes. Details: occasional glass wine     Medication History  Have you taken any of the following medications?   Blood thinner (Coumadin or Heparin): No   Chemotherapy (ex: Lupron, Arimidex): No   Depo Provera: No   Anti-Seizure (ex: Dilantin, Depakote): No   Steroids (ex: Prednisone, Cortisone): intermittent burst for eyrathema multiforma    Thyroid (ex: Synthroid): No  Have you used any of the following medications?   Actonel (Risedronate): No   Aredia (Pamidronate): No   Boniva (Ibindronate): No   Didronil (Etidronate): No   Evista (Raloxifene): No   Fosamax (Alendronate): No   Forteo (Parathyroid hormone) injections: No   HCTZ (Thiazide): No   Calcitonin nasal spray: No   Reclast or Zometa (Zolendronate): " 2019 and 2021  - cancelled the 3rd IV reclast in 2022 because has bone loss in jaw  - not sure if will get implants -    Prolia (Denosumab): No   HT: yes for 1 yr  20 yrs ago   Current Outpatient Medications   Medication Sig Dispense Refill     albuterol (PROAIR HFA/PROVENTIL HFA/VENTOLIN HFA) 108 (90 Base) MCG/ACT inhaler Inhale 2 puffs into the lungs as needed 8.5 g 3     Cholecalciferol (VITAMIN D3) 1000 UNITS CAPS Take 1 capsule by mouth daily       fluocinonide (LIDEX) 0.05 % external gel Apply topically 2 times daily 15 g 3     methylcellulose (CITRUCEL) 500 MG TABS tablet  (Patient not taking: No sig reported)       omeprazole (PRILOSEC) 10 MG DR capsule Take 20 mg by mouth as needed       Psyllium (METAMUCIL FREE & NATURAL) 43 % POWD             Allergies   Allergen Reactions     Atorvastatin Rash     Nickel Rash     Other reaction(s): *Unknown  Other reaction(s): *Unknown       Past Medical History  Do you have or have you had any of the following?   Celiac sprue (wheat intolerance):No   Chronic low back problems (ex: scohosis, arthritis): No   Diabetes mellitus: No   High blood calcium level: No   Hip or spine injury: No   History of an eating disorder: No   History of gastric bypass: No   Hyperparathyroidism: No   Inflammatory bowel disease (ex: Crohn's, ulcerative colitis): No   Kidney disease: No   Kidney stones: No   Liver disease: No   Lupus: No   Overactive thyroid gland: No   Rhuematoid arthritis: No    Have you had any of the following?   Hysterectomy: No   Ovaries removed: No   Breast cancer: No   Family history of breast cancer: Yes. Details: p aunt     Family History   Problem Relation Age of Onset     Cerebrovascular Disease Father          CVA      Other - See Comments Mother          58 scleroderma     Heart Failure Maternal Grandfather      Heart Disease Maternal Grandfather         CHF     Diabetes Maternal Grandmother         Type 1     Cancer Paternal Grandmother      "    Endometrial CA of uterus     Breast Cancer Paternal Aunt      Diabetes Cousin         Type 1     Glaucoma No family hx of      Macular Degeneration No family hx of      Is there a family history of osteoporosis? Yes. Details: Mother   Did either parent have a hip fracture? No    ROS:  General: none  Head/Eyes: none  Ears/Nose/Throat: none  Cardiovascular: chest pain and palpitations  Respiratory: none  Gastrointestinal: none  Breast: none  Genitourinary: none  Sexual Function: none  Musculoskeletal: none  Skin: none  Neurological: none  Mental Health: none  Endocrine: none    Clinic Measurements  Vitals: /74 (BP Location: Left arm, Patient Position: Sitting, Cuff Size: Adult Regular)   Pulse 87   Ht 1.68 m (5' 6.14\")   Wt 56.5 kg (124 lb 8 oz)   SpO2 100%   BMI 20.01 kg/m    BMI= Body mass index is 20.01 kg/m .    Physical exam  Constitutional: Well appearing woman in no acute distress.   Psychological: appropriate mood.  Neck: No thyroidmegaly. No jugular venous distension, no carotid bruits.  Cardiovascular: regular rate and rhythm, normal S1 and S2, no murmurs, rubs or gallops, peripheral pulses full and symmetric   Respiratory: clear to auscultation, no wheezes or crackles, normal breath sounds.  Gastrointestinal: positive bowel sounds, nontender, no hepatosplenomegaly, no masses. No guarding or rebound.  Spine: Straight, not tender, Flexion good, Extension good, Lateral movement good, Rotational movement good  Musculoskeletal: full range of motion, no edema and motor strength is equal in the upper and lower extremities    Skin: no concerning lesions, no jaundice.  Neurological: cranial nerves intact, normal strength, reflexes at patella and biceps normal, normal gait, no tremor.     LAB  Vertebra; Fracture Assessment: NA   Dexa Scan: 4/2021       FRAX Assessment Tool: [N/A, 17% for 10 risk Major Osteoporotic, 5.3% for 10 risk of Hip Fracture]  Risk Factors: PM, T scores, age,  Reformed smoker " +FHx     Evelyn Hightower MD, PhD

## 2022-07-27 ENCOUNTER — OFFICE VISIT (OUTPATIENT)
Dept: FAMILY MEDICINE | Facility: CLINIC | Age: 72
End: 2022-07-27
Payer: COMMERCIAL

## 2022-07-27 ENCOUNTER — ANCILLARY PROCEDURE (OUTPATIENT)
Dept: MAMMOGRAPHY | Facility: CLINIC | Age: 72
End: 2022-07-27
Attending: INTERNAL MEDICINE
Payer: COMMERCIAL

## 2022-07-27 VITALS
OXYGEN SATURATION: 100 % | SYSTOLIC BLOOD PRESSURE: 113 MMHG | HEIGHT: 66 IN | WEIGHT: 124.5 LBS | BODY MASS INDEX: 20.01 KG/M2 | DIASTOLIC BLOOD PRESSURE: 74 MMHG | HEART RATE: 87 BPM

## 2022-07-27 DIAGNOSIS — M81.0 SENILE OSTEOPOROSIS: Primary | ICD-10-CM

## 2022-07-27 DIAGNOSIS — Z12.31 ENCOUNTER FOR SCREENING MAMMOGRAM FOR BREAST CANCER: ICD-10-CM

## 2022-07-27 LAB
ANION GAP SERPL CALCULATED.3IONS-SCNC: 4 MMOL/L (ref 3–14)
BUN SERPL-MCNC: 22 MG/DL (ref 7–30)
CALCIUM SERPL-MCNC: 9.5 MG/DL (ref 8.5–10.1)
CHLORIDE BLD-SCNC: 104 MMOL/L (ref 94–109)
CO2 SERPL-SCNC: 30 MMOL/L (ref 20–32)
CREAT SERPL-MCNC: 0.88 MG/DL (ref 0.52–1.04)
GFR SERPL CREATININE-BSD FRML MDRD: 69 ML/MIN/1.73M2
GLUCOSE BLD-MCNC: 90 MG/DL (ref 70–99)
MAGNESIUM SERPL-MCNC: 2.3 MG/DL (ref 1.6–2.3)
PHOSPHATE SERPL-MCNC: 3.4 MG/DL (ref 2.5–4.5)
POTASSIUM BLD-SCNC: 4 MMOL/L (ref 3.4–5.3)
PTH-INTACT SERPL-MCNC: 44 PG/ML (ref 15–65)
SODIUM SERPL-SCNC: 138 MMOL/L (ref 133–144)

## 2022-07-27 PROCEDURE — 77067 SCR MAMMO BI INCL CAD: CPT | Mod: GC | Performed by: RADIOLOGY

## 2022-07-27 PROCEDURE — 80048 BASIC METABOLIC PNL TOTAL CA: CPT | Performed by: PATHOLOGY

## 2022-07-27 PROCEDURE — 84100 ASSAY OF PHOSPHORUS: CPT | Performed by: PATHOLOGY

## 2022-07-27 PROCEDURE — 84080 ASSAY ALKALINE PHOSPHATASES: CPT | Mod: 90 | Performed by: PATHOLOGY

## 2022-07-27 PROCEDURE — 99000 SPECIMEN HANDLING OFFICE-LAB: CPT | Performed by: PATHOLOGY

## 2022-07-27 PROCEDURE — 77063 BREAST TOMOSYNTHESIS BI: CPT | Mod: GC | Performed by: RADIOLOGY

## 2022-07-27 PROCEDURE — 83970 ASSAY OF PARATHORMONE: CPT | Performed by: PATHOLOGY

## 2022-07-27 PROCEDURE — 36415 COLL VENOUS BLD VENIPUNCTURE: CPT | Performed by: PATHOLOGY

## 2022-07-27 PROCEDURE — 99214 OFFICE O/P EST MOD 30 MIN: CPT | Performed by: FAMILY MEDICINE

## 2022-07-27 PROCEDURE — 82306 VITAMIN D 25 HYDROXY: CPT | Mod: 90 | Performed by: PATHOLOGY

## 2022-07-27 PROCEDURE — 83735 ASSAY OF MAGNESIUM: CPT | Performed by: PATHOLOGY

## 2022-07-27 NOTE — PATIENT INSTRUCTIONS
Blood work today  Patient should take 1200mg of calcium/day and vitamin D3 1000IU/day.  Dietary calcium is best  1 glass of milk is 300mg calcium  Consider forteo (daily injections for 24 months) or tymlos (daily injections for 18 months)  Let me know and we will start the PA with your insurance  See me 6 months after starting the medication

## 2022-07-27 NOTE — NURSING NOTE
Sarah العلي is a 72 year old female patient that presents today in clinic for the following:    Chief Complaint   Patient presents with     RECHECK     Osteoporosis     The patient's allergies and medications were reviewed as noted. A set of vitals were recorded as noted without incident. The patient does not have any other questions for the provider.    Louis Rosales, EMT at 12:58 PM on 7/27/2022

## 2022-07-28 LAB
ALP BONE SERPL-MCNC: 5.3 UG/L
DEPRECATED CALCIDIOL+CALCIFEROL SERPL-MC: 37 UG/L (ref 20–75)

## 2022-07-29 DIAGNOSIS — M81.0 SENILE OSTEOPOROSIS: Primary | ICD-10-CM

## 2022-07-29 DIAGNOSIS — Z92.29 HX DRUG THERAPY: ICD-10-CM

## 2022-08-09 ENCOUNTER — TELEPHONE (OUTPATIENT)
Dept: INTERNAL MEDICINE | Facility: CLINIC | Age: 72
End: 2022-08-09

## 2022-08-09 NOTE — TELEPHONE ENCOUNTER
Left a message to the pt regarding this matter.    [facility]  ---------------------------------------------------------------------------    Approved:     [facility] / Prolia () No PA REQ - No Ale Policy - Per [employer] Policy for DX M81.0 and Z92.29 - copay assistance N/A since pt has a Medicare plan     Patient is good to go, Thanks!   -Bj Vegas

## 2022-08-09 NOTE — TELEPHONE ENCOUNTER
Health Call Center    Phone Message    May a detailed message be left on voicemail: yes     Reason for Call: Other: Patient is scheduled for a nurse visit tomorrow 08/10/2022 for a Prolia injection. Patient spoke to Medicare and [employer] to check coverage and they told her that it will not be covered unless the clinic calls the provider assistance team to verify what the coverage is.       Provider Assistance PH#: [phone]    Please call back the patient today to let her know if she can keep the appt tomorrow or if she needs to reschedule.     Action Taken: Message routed to:  [facility]    Travel Screening: Not Applicable

## 2022-08-10 ENCOUNTER — ALLIED HEALTH/NURSE VISIT (OUTPATIENT)
Dept: INTERNAL MEDICINE | Facility: CLINIC | Age: 72
End: 2022-08-10
Payer: COMMERCIAL

## 2022-08-10 DIAGNOSIS — M81.0 SENILE OSTEOPOROSIS: Primary | ICD-10-CM

## 2022-08-10 PROCEDURE — 96372 THER/PROPH/DIAG INJ SC/IM: CPT | Performed by: FAMILY MEDICINE

## 2022-08-10 PROCEDURE — 99207 PR NO CHARGE INJECTABLE MED/DRUG: CPT

## 2022-08-10 NOTE — PROGRESS NOTES
Sarah العلي comes into clinic today at the request of Dr. Hightower Ordering Provider for Med Injection only prolia.    Clinic Administered Medication Documentation        Prolia Documentation    Prior to injection, verified patient identity using patient's name and date of birth. Medication was administered. Please see MAR and medication order for additional information. Patient instructed to remain in clinic for 15 minutes and report any adverse reaction to staff immediately .    Indication: Prolia  (denosumab) is a prescription medicine used to treat osteoporosis in patients who:     Are at high risk for fracture, meaning patients who have had a fracture related to osteoporosis, or who have multiple risk factors for fracture.    Cannot use another osteoporosis medicine or other osteoporosis medicines did not work well.    The timeline for early/late injections would be 4 weeks early and any time after the 6 month charleen. If a patient receives their injection late, then the subsequent injection would be 6 months from the date that they actually received the injection.    When was the last injection?  This was the first  Was the last injection at least 6 months ago? N/A      Name of provider who requested the medication administration: Dr. Hightower  Name of provider on site (faculty or community preceptor) at the time of performing the medication administration: Dr. Hudson    Date of next administration: not scheduled yet  Date of next office visit with provider to renew medication plan (must be seen annually): 3/1/23    Pt tolerated well. No redness or swelling near site.    Pt was already scheduled for 2 wk phos/mag/vernon labs on 8/22/2022.    This service provided today was under the supervising provider of the day Dr. Hudson, who was available if needed.    Chloe Powers, EMT

## 2022-08-10 NOTE — NURSING NOTE
Chief Complaint   Patient presents with     Nurse Visit     Pt here for prolia inj per Dr. Campbell Powers, CMA, EMT at 8:56 AM on 8/10/2022.

## 2022-08-22 ENCOUNTER — LAB (OUTPATIENT)
Dept: LAB | Facility: CLINIC | Age: 72
End: 2022-08-22
Payer: COMMERCIAL

## 2022-08-22 DIAGNOSIS — M81.0 SENILE OSTEOPOROSIS: ICD-10-CM

## 2022-08-22 LAB
MAGNESIUM SERPL-MCNC: 2.3 MG/DL (ref 1.6–2.3)
PHOSPHATE SERPL-MCNC: 3.5 MG/DL (ref 2.5–4.5)

## 2022-08-22 PROCEDURE — 83735 ASSAY OF MAGNESIUM: CPT | Performed by: PATHOLOGY

## 2022-08-22 PROCEDURE — 36415 COLL VENOUS BLD VENIPUNCTURE: CPT | Performed by: PATHOLOGY

## 2022-08-22 PROCEDURE — 84100 ASSAY OF PHOSPHORUS: CPT | Performed by: PATHOLOGY

## 2022-09-12 ENCOUNTER — HOSPITAL ENCOUNTER (OUTPATIENT)
Dept: CARDIOLOGY | Facility: CLINIC | Age: 72
Discharge: HOME | End: 2022-09-12
Attending: INTERNAL MEDICINE | Admitting: INTERNAL MEDICINE
Payer: COMMERCIAL

## 2022-09-12 LAB — LVEF ECHO: NORMAL

## 2022-09-12 PROCEDURE — 93306 TTE W/DOPPLER COMPLETE: CPT | Mod: 26 | Performed by: INTERNAL MEDICINE

## 2022-09-12 PROCEDURE — 93306 TTE W/DOPPLER COMPLETE: CPT

## 2022-09-16 ENCOUNTER — TELEPHONE (OUTPATIENT)
Dept: DERMATOLOGY | Facility: CLINIC | Age: 72
End: 2022-09-16

## 2022-09-16 ENCOUNTER — IMMUNIZATION (OUTPATIENT)
Dept: NURSING | Facility: CLINIC | Age: 72
End: 2022-09-16
Payer: COMMERCIAL

## 2022-09-16 PROCEDURE — G0008 ADMIN INFLUENZA VIRUS VAC: HCPCS | Mod: 59

## 2022-09-16 PROCEDURE — 91313 COVID-19,PF,MODERNA BIVALENT: CPT

## 2022-09-16 PROCEDURE — 0134A COVID-19,PF,MODERNA BIVALENT: CPT

## 2022-09-16 PROCEDURE — 90662 IIV NO PRSV INCREASED AG IM: CPT

## 2022-09-16 NOTE — TELEPHONE ENCOUNTER
Pt called back but did not want to schedule an appointment with any provider since the first available is in Feb.

## 2022-09-22 DIAGNOSIS — R06.02 SOB (SHORTNESS OF BREATH): Primary | ICD-10-CM

## 2022-09-26 ENCOUNTER — PRE VISIT (OUTPATIENT)
Dept: OTOLARYNGOLOGY | Facility: CLINIC | Age: 72
End: 2022-09-26

## 2022-09-26 ENCOUNTER — OFFICE VISIT (OUTPATIENT)
Dept: OTOLARYNGOLOGY | Facility: CLINIC | Age: 72
End: 2022-09-26
Attending: INTERNAL MEDICINE
Payer: COMMERCIAL

## 2022-09-26 VITALS
TEMPERATURE: 97.1 F | OXYGEN SATURATION: 100 % | DIASTOLIC BLOOD PRESSURE: 83 MMHG | SYSTOLIC BLOOD PRESSURE: 123 MMHG | HEART RATE: 78 BPM | HEIGHT: 66 IN | BODY MASS INDEX: 20.06 KG/M2 | WEIGHT: 124.8 LBS

## 2022-09-26 DIAGNOSIS — J34.89 NASAL OBSTRUCTION: ICD-10-CM

## 2022-09-26 PROCEDURE — 99214 OFFICE O/P EST MOD 30 MIN: CPT | Performed by: OTOLARYNGOLOGY

## 2022-09-26 ASSESSMENT — PAIN SCALES - GENERAL: PAINLEVEL: NO PAIN (0)

## 2022-09-26 NOTE — NURSING NOTE
"Blood pressure 123/83, pulse 78, temperature 97.1  F (36.2  C), temperature source Temporal, height 1.68 m (5' 6.14\"), weight 56.6 kg (124 lb 12.8 oz), SpO2 100 %, not currently breastfeeding.    Aurea Russo LPN    "

## 2022-09-26 NOTE — PROGRESS NOTES
HISTORY OF PRESENT ILLNESS:  Sarah العلي is here to see us today for the first time.  She has chronic left sided nasal obstruction.  It is worse at night.  She does get some intermittent relief with Afrin, although she does not use it commonly.  She has had no previous nasal surgical intervention.  She had nasal trauma 40 years ago when her son as a toddler bonked his head into her nose.  She is able to smell things.  She does not get recurrent sinus infections.  She otherwise does not have problems with epistaxis or other nasal complaints other than congestion.    PAST MEDICAL HISTORY: Noted and reviewed in the chart.     FAMILY HISTORY: Noted and reviewed in the chart.     PAST SURGICAL HISTORY: Noted and reviewed in the chart.     SOCIAL HISTORY: Noted and reviewed in the chart.     REVIEW OF SYSTEMS:  Pertinent positives and negatives as above.  Otherwise, complete review of systems noted and reviewed in the chart.    PHYSICAL EXAMINATION:  A 72-year-old woman in no acute distress.  Normal mood and affect, normal ability to communicate.  Alert and appropriate.  Head is normocephalic.  Cranial nerve VII is House-Brackmann I/VI bilaterally.  Breathing without difficulty or stridor.  Eyes are anicteric.  Skin of the head and neck appears normal.  Examination of the nose demonstrates left sided septal deviation and left inferior turbinate hypertrophy.    ASSESSMENT AND PLAN: At this point, we discussed options and the patient is interested in turbinate Coblation in clinic as a first step.  She does have some pulmonary hypertension and we have some shared concerns about general anesthesia.  The turbinate reduction would be on the left side and if it was unsuccessful in adequately controlling her symptoms, we could consider surgical intervention in the operating room at some point.  We will find a time to reduce her left turbinate in clinic.

## 2022-09-26 NOTE — LETTER
9/26/2022       RE: Sarah العلي  [address]  [place] [address]     Dear Colleague,    Thank you for referring your patient, Sarah العلي, to the [facility] [place] [facility]. Please see a copy of my visit note below.    HISTORY OF PRESENT ILLNESS:  Sarah العلي is here to see us today for the first time.  She has chronic left sided nasal obstruction.  It is worse at night.  She does get some intermittent relief with Afrin, although she does not use it commonly.  She has had no previous nasal surgical intervention.  She had nasal trauma 40 years ago when her son as a toddler bonked his head into her nose.  She is able to smell things.  She does not get recurrent sinus infections.  She otherwise does not have problems with epistaxis or other nasal complaints other than congestion.    PAST MEDICAL HISTORY: Noted and reviewed in the chart.     FAMILY HISTORY: Noted and reviewed in the chart.     PAST SURGICAL HISTORY: Noted and reviewed in the chart.     SOCIAL HISTORY: Noted and reviewed in the chart.     REVIEW OF SYSTEMS:  Pertinent positives and negatives as above.  Otherwise, complete review of systems noted and reviewed in the chart.    PHYSICAL EXAMINATION:  A 72-year-old woman in no acute distress.  Normal mood and affect, normal ability to communicate.  Alert and appropriate.  Head is normocephalic.  Cranial nerve VII is House-Brackmann I/VI bilaterally.  Breathing without difficulty or stridor.  Eyes are anicteric.  Skin of the head and neck appears normal.  Examination of the nose demonstrates left sided septal deviation and left inferior turbinate hypertrophy.    ASSESSMENT AND PLAN: At this point, we discussed options and the patient is interested in turbinate Coblation in clinic as a first step.  She does have some pulmonary hypertension and we have some shared concerns about general anesthesia.   The turbinate reduction would be on the left side and if it was unsuccessful in adequately controlling her symptoms, we could consider surgical intervention in the operating room at some point.  We will find a time to reduce her left turbinate in clinic.      Again, thank you for allowing me to participate in the care of your patient.      Sincerely,    Israel Winkler MD

## 2022-09-27 ASSESSMENT — ENCOUNTER SYMPTOMS
DYSURIA: 0
SYNCOPE: 0
DIFFICULTY URINATING: 0
HYPERTENSION: 0
HYPOTENSION: 0
PALPITATIONS: 1
EXERCISE INTOLERANCE: 1
ORTHOPNEA: 0
FLANK PAIN: 0
LIGHT-HEADEDNESS: 0
SLEEP DISTURBANCES DUE TO BREATHING: 0
HEMATURIA: 0
LEG PAIN: 0

## 2022-09-28 ENCOUNTER — TELEPHONE (OUTPATIENT)
Dept: OTOLARYNGOLOGY | Facility: CLINIC | Age: 72
End: 2022-09-28

## 2022-09-28 NOTE — TELEPHONE ENCOUNTER
[facility] Call Center    Phone Message    May a detailed message be left on voicemail: yes     Reason for Call: Other: Pt has procedure scheduled with  on 10/10 and has a few questions she'll like to ask one of his nurses. Please reach out to pt regarding her pre procedure questions.    Thank You!    Action Taken: Message routed to:  [facility] ([facility]): ent    Travel Screening: Not Applicable

## 2022-09-28 NOTE — TELEPHONE ENCOUNTER
"Writer called patient again after speaking with Dr. Winkler. Informed patient that he will not be removing her turbinate but reducing her tissue. She was concerned about possible \"empty nose\" syndrome, which is not common in this procedure because we are not removing the turbinate.     She verbalized understanding and would like to move forward with scheduled procedure.     Iris Gilbert RN on 9/28/2022 at 2:04 PM   "

## 2022-09-28 NOTE — TELEPHONE ENCOUNTER
Writer called patient and answered all questions I could with in my scope of practice. The patient had a few questions that I could not answer and informed her I would differ to Dr. Winkler.     I will have Dr. Winkler call the patient for further questions regarding in clinic turbinate reduction.     Iris Gilbert RN on 9/28/2022 at 1:21 PM

## 2022-10-04 ENCOUNTER — LAB (OUTPATIENT)
Dept: LAB | Facility: CLINIC | Age: 72
End: 2022-10-04
Payer: COMMERCIAL

## 2022-10-04 ENCOUNTER — OFFICE VISIT (OUTPATIENT)
Dept: CARDIOLOGY | Facility: CLINIC | Age: 72
End: 2022-10-04
Attending: INTERNAL MEDICINE
Payer: COMMERCIAL

## 2022-10-04 ENCOUNTER — TELEPHONE (OUTPATIENT)
Dept: CARDIOLOGY | Facility: CLINIC | Age: 72
End: 2022-10-04

## 2022-10-04 VITALS
HEART RATE: 74 BPM | DIASTOLIC BLOOD PRESSURE: 71 MMHG | SYSTOLIC BLOOD PRESSURE: 153 MMHG | WEIGHT: 126.1 LBS | OXYGEN SATURATION: 100 % | HEIGHT: 68 IN | BODY MASS INDEX: 19.11 KG/M2

## 2022-10-04 DIAGNOSIS — I50.30 HEART FAILURE WITH PRESERVED EJECTION FRACTION, UNSPECIFIED HF CHRONICITY (H): ICD-10-CM

## 2022-10-04 DIAGNOSIS — R06.02 SOB (SHORTNESS OF BREATH): ICD-10-CM

## 2022-10-04 DIAGNOSIS — R06.02 SOB (SHORTNESS OF BREATH): Primary | ICD-10-CM

## 2022-10-04 LAB
ANION GAP SERPL CALCULATED.3IONS-SCNC: 9 MMOL/L (ref 7–15)
BUN SERPL-MCNC: 18.7 MG/DL (ref 8–23)
CALCIUM SERPL-MCNC: 9.4 MG/DL (ref 8.8–10.2)
CHLORIDE SERPL-SCNC: 99 MMOL/L (ref 98–107)
CREAT SERPL-MCNC: 0.77 MG/DL (ref 0.51–0.95)
DEPRECATED HCO3 PLAS-SCNC: 25 MMOL/L (ref 22–29)
GFR SERPL CREATININE-BSD FRML MDRD: 82 ML/MIN/1.73M2
GLUCOSE SERPL-MCNC: 87 MG/DL (ref 70–99)
NT-PROBNP SERPL-MCNC: 279 PG/ML (ref 0–125)
POTASSIUM SERPL-SCNC: 4.3 MMOL/L (ref 3.4–5.3)
SODIUM SERPL-SCNC: 133 MMOL/L (ref 136–145)

## 2022-10-04 PROCEDURE — 80048 BASIC METABOLIC PNL TOTAL CA: CPT | Performed by: PATHOLOGY

## 2022-10-04 PROCEDURE — 99214 OFFICE O/P EST MOD 30 MIN: CPT | Performed by: INTERNAL MEDICINE

## 2022-10-04 PROCEDURE — 83880 ASSAY OF NATRIURETIC PEPTIDE: CPT | Performed by: PATHOLOGY

## 2022-10-04 PROCEDURE — 36415 COLL VENOUS BLD VENIPUNCTURE: CPT | Performed by: PATHOLOGY

## 2022-10-04 PROCEDURE — G0463 HOSPITAL OUTPT CLINIC VISIT: HCPCS

## 2022-10-04 ASSESSMENT — PAIN SCALES - GENERAL: PAINLEVEL: NO PAIN (0)

## 2022-10-04 NOTE — PROGRESS NOTES
"Dear Dr. Queen,    We had the pleasure of seeing Ms. Sarah العلي for follow-up in her pulmonary hypertension clinic today.  As you know she is a 72-year-old female with Raynaud's disease and family history of scleroderma who we saw in clinic last month for exertional shortness of breath.    She is doing overall well.  She and her  are walking 30 minutes a day.  She has not noticed any decrease in her exercise capacity.  No exertional chest pain or chest pressure.  No exertional presyncope or syncope.  No lower extremity swelling or abdominal distention.  No recent hospitalizations or ER visits.  Her palpitations are still there especially at nighttime.  It does bother her but she is not having any other associated symptoms.      PMH:  Past Medical History:   Diagnosis Date     Age-related osteoporosis without current pathological fracture 2/22/2019     Benign positional vertigo decades     Bilateral hearing loss, unspecified hearing loss type 12/6/2018     CARDIOVASCULAR SCREENING; LDL GOAL LESS THAN 160 7/17/2013     Cerebral aneurysm 12/2017     Erythema multiforme 9/26/2016     GERD (gastroesophageal reflux disease)      Hearing problem '04 & \"18    SNHL mild L ear;moderate-severe R ear  >75% loss word compre     Hematuria 9/26/2016    Overview:  Has had urologic evaluation     Laryngospasm 9/26/2016     Lichen planus      Migraine     with auora     Migraines 2000    Ocular migraine/ Migraine with Aura     Ocular migraine 9/26/2016     Oral lichen planus 9/26/2016     Osteopenia 2010     Osteopenia of left hip 12/12/2018     Physical exam 9/26/2016    Overview:  Full code.  Would want her  and son to make medical decisions for her if she were unable to do so.  Does not have an advanced directive.     Overview:  Diet - tries to eat a healthy diet  Exercise - \"I'm a fair weather walker\"  Active during the day Mammogram - 10/2017  Colonoscopy - 2/2008 - next in 2018  DXA - 8/2015 - next in 2018  " "Immunizations -  Up to date      Right internal carotid artery aneurysm 12/6/2018    5 mm     Sensorineural hearing loss 2/04 & 4/18 2/18 worsened     Tinnitus 12/2018    R ear only     White coat syndrome without diagnosis of hypertension 12/6/2018     Current Medications    Current Outpatient Medications   Medication Sig     albuterol (PROAIR HFA/PROVENTIL HFA/VENTOLIN HFA) 108 (90 Base) MCG/ACT inhaler Inhale 2 puffs into the lungs as needed     Cholecalciferol (VITAMIN D3) 1000 UNITS CAPS Take 1 capsule by mouth daily     fluocinonide (LIDEX) 0.05 % external gel Apply topically 2 times daily     Psyllium (METAMUCIL FREE & NATURAL) 43 % POWD      methylcellulose (CITRUCEL) 500 MG TABS tablet  (Patient not taking: No sig reported)     omeprazole (PRILOSEC) 10 MG DR capsule Take 20 mg by mouth as needed     No current facility-administered medications for this visit.     Exam:  BP (!) 153/71 (BP Location: Right arm, Patient Position: Chair, Cuff Size: Adult Regular)   Pulse 74   Ht 1.725 m (5' 7.91\")   Wt 57.2 kg (126 lb 1.6 oz)   SpO2 100%   BMI 19.22 kg/m    She was awake, alert, oriented x3.  She was comfortable.  She was in no apparent distress.  She had no pallor, cyanosis or jaundice.  Her neck exam revealed no jugular venous distention.  She had no lower extremity edema.  Cardiac auscultation revealed normal S1 and S2 with no murmur rub or gallop.  Auscultation of the lungs revealed equal air entry on both sides with no added sound.  Her abdomen was soft with normal also no tenderness no rigidity no guarding.  She had no focal neurological deficit.  Her extremities showed no edema.    CBC RESULTS: Recent Labs   Lab Test 11/10/21  0912 11/10/21  0859 11/10/21  0655   WBC  --   --  4.6   RBC  --   --  4.39   HGB 12.3   < > 13.4   HCT  --   --  41.4   MCV  --   --  94   MCH  --   --  30.5   MCHC  --   --  32.4   RDW  --   --  12.2   PLT  --   --  296    < > = values in this interval not displayed. "     Recent Labs   Lab Test 07/27/22  1400 11/10/21  0655    133   POTASSIUM 4.0 4.1   CHLORIDE 104 102   CO2 30 25   ANIONGAP 4 6   GLC 90 90   BUN 22 14   CR 0.88 0.70   MARCIO 9.5 9.0     Liver Function Studies - Recent Labs   Lab Test 11/10/21  0655   PROTTOTAL 7.5   ALBUMIN 3.8   BILITOTAL 0.5   ALKPHOS 30*   AST 22   ALT 18     Lab Results   Component Value Date    NTBNPI 391 11/10/2021     No results found for: NTBNP    PFT (2021)  Her pulmonary function test showed an FVC of 97% FEV1 of 96%, FEV1 by FVC of 76%, TLC of 101% and a DLCO of 96%.    CT pulmonary angiogram (2021)  Her CT dual-energy pulmonary angiogram showed no evidence of chronic thromboembolic disease.  She had no parenchymal abnormalities.  She had multiple solitary nodules.    CPEX (2021)  She had a cardiopulmonary exercise testing.  She exercised for 7 minutes and 32 seconds.  She did not achieve anaerobic threshold.  Her RER was 0.94.  Her VO2 was 19 mL/kg/min with was 82% age of 18 gender predicted.  Her VE VCO2 slope was normal at 25.7.  She had appropriate blood pressure and heart rate response.  She did not encroach upon her breathing reserve.  Her breathing reserve at peak exercise was normal at 61.    Her NT proBNP was normal.  Her CBC chemistry and liver function tests were unremarkable.    She had exercise right heart catheterization with the following hemodynamics.    Baseline:  RA: 7/10/7  RV: 35/8  PCWP: 13/20/13  PA: 35/15/24  PA Sat: 77.2%  Hb: 12.4  HR: 72  Ao Sat: 100%  Basilio CO/CI: 4.9/2.9  TD CO/CI: 4.1/2.5  VO2: 187  PVR 2.2      Exercise (4 min 50 seconds):  RA: 8/14/8  PA: 54/35/42  PCWP: 35/51/35  PA Sat: 39.3%  Hb: 12.3  HR: 138  Ao Sat: 97%  Basilio CO/CI: 8.5/5.1  VO2: 821  PVR 0.8 CARLOS    Assessment and Plan:  In summary Ms. العلي is a very pleasant 72-year-old female with Raynaud's disease, a family history of scleroderma, and is induced heart failure with preserved ejection fraction who returns today for follow-up.  y.    Exercise-induced heart failure with preserved ejection fraction     She is stable.  She is functional class II.  She has no evidence of decompensated heart failure.    We discussed the recent evidence on sodium glucose cord transport receptor inhibitor in patients with heart failure with preserved ejection fraction.  She understands that this is a class IIa recommendation.  She is willing to try this.  She has not had any urinary tract infections in the recent past.  We will start her on empagliflozin 10 mg daily.    We discussed the importance of low-salt diet and fluid restriction.  We also discussed the importance of regular exercise.    Supraventricular arrhythmia    We discussed the potential option of starting beta-blockers however given the concern of fatigue she would like to hold off which I think is very reasonable.  Her SVT burden is very low.  She has symptoms mainly at nighttime.  We will continue to monitor this closely.    Pulmonary nodules  She was recently seen in the pulmonary nodule clinic at [facility].  They have recommended her to have a repeat CT scan of the chest in a year.  She will follow up with them.    I have recommended her to return to clinic in a year with a repeat echocardiogram and labs.  She will call us in the interim with any further worsening symptoms. It was a pleasure seeing Ms. العلي in our bone hypertension clinic at [facility].    Total time today was 35 minutes reviewing notes, imaging, labs, patient visit, orders and documentation         Sincerely,  Negrita Rees MD   [facility]  Heart Failure, Transplant, and Mechanical Circulatory Support Cardiology   Cardiovascular Division  [facility]   [phone]

## 2022-10-04 NOTE — NURSING NOTE
Chief Complaint   Patient presents with     Follow Up     RTN HF: with exercise induced HFpEF. 1 year follow-up with echo and labs prior       Vitals were taken and medications reconciled.    Gurdeep Joy, EMT  9:00 AM

## 2022-10-04 NOTE — LETTER
"10/4/2022      RE: Sarah العلي  [address]  [place] [address]       Dear Colleague,    Thank you for the opportunity to participate in the care of your patient, Sarah العلي, at the [facility] [place] [facility]. Please see a copy of my visit note below.    Dear Dr. Queen,    We had the pleasure of seeing Ms. Sarah العلي for follow-up in her pulmonary hypertension clinic today.  As you know she is a 72-year-old female with Raynaud's disease and family history of scleroderma who we saw in clinic last month for exertional shortness of breath.    She is doing overall well.  She and her  are walking 30 minutes a day.  She has not noticed any decrease in her exercise capacity.  No exertional chest pain or chest pressure.  No exertional presyncope or syncope.  No lower extremity swelling or abdominal distention.  No recent hospitalizations or ER visits.  Her palpitations are still there especially at nighttime.  It does bother her but she is not having any other associated symptoms.      PMH:  Past Medical History:   Diagnosis Date     Age-related osteoporosis without current pathological fracture 2/22/2019     Benign positional vertigo decades     Bilateral hearing loss, unspecified hearing loss type 12/6/2018     CARDIOVASCULAR SCREENING; LDL GOAL LESS THAN 160 7/17/2013     Cerebral aneurysm 12/2017     Erythema multiforme 9/26/2016     GERD (gastroesophageal reflux disease)      Hearing problem '04 & \"18    SNHL mild L ear;moderate-severe R ear  >75% loss word compre     Hematuria 9/26/2016    Overview:  Has had urologic evaluation     Laryngospasm 9/26/2016     Lichen planus      Migraine     with auora     Migraines 2000    Ocular migraine/ Migraine with Aura     Ocular migraine 9/26/2016     Oral lichen planus 9/26/2016     Osteopenia 2010     Osteopenia of left hip 12/12/2018     Physical exam 9/26/2016    " "Overview:  Full code.  Would want her  and son to make medical decisions for her if she were unable to do so.  Does not have an advanced directive.     Overview:  Diet - tries to eat a healthy diet  Exercise - \"I'm a fair weather walker\"  Active during the day Mammogram - 10/2017  Colonoscopy - 2/2008 - next in 2018  DXA - 8/2015 - next in 2018  Immunizations -  Up to date      Right internal carotid artery aneurysm 12/6/2018    5 mm     Sensorineural hearing loss 2/04 & 4/18 2/18 worsened     Tinnitus 12/2018    R ear only     White coat syndrome without diagnosis of hypertension 12/6/2018     Current Medications    Current Outpatient Medications   Medication Sig     albuterol (PROAIR HFA/PROVENTIL HFA/VENTOLIN HFA) 108 (90 Base) MCG/ACT inhaler Inhale 2 puffs into the lungs as needed     Cholecalciferol (VITAMIN D3) 1000 UNITS CAPS Take 1 capsule by mouth daily     fluocinonide (LIDEX) 0.05 % external gel Apply topically 2 times daily     Psyllium (METAMUCIL FREE & NATURAL) 43 % POWD      methylcellulose (CITRUCEL) 500 MG TABS tablet  (Patient not taking: No sig reported)     omeprazole (PRILOSEC) 10 MG DR capsule Take 20 mg by mouth as needed     No current facility-administered medications for this visit.     Exam:  BP (!) 153/71 (BP Location: Right arm, Patient Position: Chair, Cuff Size: Adult Regular)   Pulse 74   Ht 1.725 m (5' 7.91\")   Wt 57.2 kg (126 lb 1.6 oz)   SpO2 100%   BMI 19.22 kg/m    She was awake, alert, oriented x3.  She was comfortable.  She was in no apparent distress.  She had no pallor, cyanosis or jaundice.  Her neck exam revealed no jugular venous distention.  She had no lower extremity edema.  Cardiac auscultation revealed normal S1 and S2 with no murmur rub or gallop.  Auscultation of the lungs revealed equal air entry on both sides with no added sound.  Her abdomen was soft with normal also no tenderness no rigidity no guarding.  She had no focal neurological " deficit.  Her extremities showed no edema.    CBC RESULTS: Recent Labs   Lab Test 11/10/21  0912 11/10/21  0859 11/10/21  0655   WBC  --   --  4.6   RBC  --   --  4.39   HGB 12.3   < > 13.4   HCT  --   --  41.4   MCV  --   --  94   MCH  --   --  30.5   MCHC  --   --  32.4   RDW  --   --  12.2   PLT  --   --  296    < > = values in this interval not displayed.     Recent Labs   Lab Test 07/27/22  1400 11/10/21  0655    133   POTASSIUM 4.0 4.1   CHLORIDE 104 102   CO2 30 25   ANIONGAP 4 6   GLC 90 90   BUN 22 14   CR 0.88 0.70   MARCIO 9.5 9.0     Liver Function Studies - Recent Labs   Lab Test 11/10/21  0655   PROTTOTAL 7.5   ALBUMIN 3.8   BILITOTAL 0.5   ALKPHOS 30*   AST 22   ALT 18     Lab Results   Component Value Date    NTBNPI 391 11/10/2021     No results found for: NTBNP    PFT (2021)  Her pulmonary function test showed an FVC of 97% FEV1 of 96%, FEV1 by FVC of 76%, TLC of 101% and a DLCO of 96%.    CT pulmonary angiogram (2021)  Her CT dual-energy pulmonary angiogram showed no evidence of chronic thromboembolic disease.  She had no parenchymal abnormalities.  She had multiple solitary nodules.    CPEX (2021)  She had a cardiopulmonary exercise testing.  She exercised for 7 minutes and 32 seconds.  She did not achieve anaerobic threshold.  Her RER was 0.94.  Her VO2 was 19 mL/kg/min with was 82% age of 18 gender predicted.  Her VE VCO2 slope was normal at 25.7.  She had appropriate blood pressure and heart rate response.  She did not encroach upon her breathing reserve.  Her breathing reserve at peak exercise was normal at 61.    Her NT proBNP was normal.  Her CBC chemistry and liver function tests were unremarkable.    She had exercise right heart catheterization with the following hemodynamics.    Baseline:  RA: 7/10/7  RV: 35/8  PCWP: 13/20/13  PA: 35/15/24  PA Sat: 77.2%  Hb: 12.4  HR: 72  Ao Sat: 100%  Basilio CO/CI: 4.9/2.9  TD CO/CI: 4.1/2.5  VO2: 187  PVR 2.2      Exercise (4 min 50 seconds):  RA:  8/14/8  PA: 54/35/42  PCWP: 35/51/35  PA Sat: 39.3%  Hb: 12.3  HR: 138  Ao Sat: 97%  Basilio CO/CI: 8.5/5.1  VO2: 821  PVR 0.8 CARLOS    Assessment and Plan:  In summary Ms. العلي is a very pleasant 72-year-old female with Raynaud's disease, a family history of scleroderma, and is induced heart failure with preserved ejection fraction who returns today for follow-up. y.    Exercise-induced heart failure with preserved ejection fraction     She is stable.  She is functional class II.  She has no evidence of decompensated heart failure.    We discussed the recent evidence on sodium glucose cord transport receptor inhibitor in patients with heart failure with preserved ejection fraction.  She understands that this is a class IIa recommendation.  She is willing to try this.  She has not had any urinary tract infections in the recent past.  We will start her on empagliflozin 10 mg daily.    We discussed the importance of low-salt diet and fluid restriction.  We also discussed the importance of regular exercise.    Supraventricular arrhythmia    We discussed the potential option of starting beta-blockers however given the concern of fatigue she would like to hold off which I think is very reasonable.  Her SVT burden is very low.  She has symptoms mainly at nighttime.  We will continue to monitor this closely.    Pulmonary nodules  She was recently seen in the pulmonary nodule clinic at [facility].  They have recommended her to have a repeat CT scan of the chest in a year.  She will follow up with them.    I have recommended her to return to clinic in a year with a repeat echocardiogram and labs.  She will call us in the interim with any further worsening symptoms. It was a pleasure seeing Ms. العلي in our bone hypertension clinic at [facility].    Total time today was 35 minutes reviewing notes, imaging, labs, patient visit, orders and documentation         Sincerely,  Negrita Rees MD   [facility]  Pulmonary Hypertension  Heart Failure, Transplant, and Mechanical Circulatory Support Cardiology   Cardiovascular Division  [facility]   [phone]                            Please do not hesitate to contact me if you have any questions/concerns.     Sincerely,     Negrita Rees MD

## 2022-10-04 NOTE — PATIENT INSTRUCTIONS
You were seen by Dr. Víctor Renee today for heart failure with preserved ejection fraction.    Patient Instructions:  It was a pleasure to see you in the cardiology clinic today.      If you have any questions, call the cardiology clinic, at [phone].   [facility] Cardiology Clinics.  To schedule an appointment or to leave a message for your Care Team Press #1  If you are a physician calling for another physician Press #2  For Billing Press #3  For Medical Records Press #4  We are encouraging the use of Ardian to communicate with your HealthCare Provider    Note the new medications: Jardiance 10 mg by mouth daily  Stop the following medications: none    The results from today include: pending labs  Please follow up with Dr. Renee in 3 months with labs      If you have an urgent need after hours (8:00 am to 4:30 pm) please call [phone] and ask for the cardiology fellow on call.

## 2022-10-04 NOTE — TELEPHONE ENCOUNTER
Health Call Center    Phone Message    May a detailed message be left on voicemail: no     Reason for Call: Medication Question or concern regarding medication   Prescription Clarification  Name of Medication: empagliflozin (JARDIANCE) 10 MG TABS tablet  Prescribing Provider: Negrita Villanueva MD    Pharmacy: [facility] #[id number]  [place], [place] - [address]   What on the order needs clarification?   Magdiel would like to discontinue the Rx for empagliflozin (JARDIANCE) 10 MG TABS tablet sent today 10/4/22 because she spoke with her insurance company and they would charge her 2 deductibles, 1 now and 1 in January. She would like to continue the medication in January and would like to request a 30 day supply with 2 refills rather than a 90 day supply and has already scheduled a F/U with Dr. Rees in April.           Action Taken: Message routed to:  [facility] Cardiology    Travel Screening: Not Applicable

## 2022-10-05 NOTE — TELEPHONE ENCOUNTER
10/5 office visit plan was to start jardiance. Pt reports cost is too much and would need to pay her 400$ deductible now and again in January when her plan restarts for the year. Being the medication is new for her she would like to wait until January and start with a 30 day supply to see if she tolerates this. Plan for patient to check blood pressures daily and update nurse x1 week for readings and patient treatment plan can be updated from there regarding medication management.     Brigida Cabrales RN on 10/5/2022 at 11:28 AM

## 2022-10-10 ENCOUNTER — OFFICE VISIT (OUTPATIENT)
Dept: OTOLARYNGOLOGY | Facility: CLINIC | Age: 72
End: 2022-10-10
Payer: COMMERCIAL

## 2022-10-10 VITALS
TEMPERATURE: 96.9 F | WEIGHT: 124.7 LBS | BODY MASS INDEX: 18.9 KG/M2 | HEART RATE: 74 BPM | SYSTOLIC BLOOD PRESSURE: 138 MMHG | OXYGEN SATURATION: 99 % | DIASTOLIC BLOOD PRESSURE: 80 MMHG | HEIGHT: 68 IN

## 2022-10-10 DIAGNOSIS — G89.18 ACUTE POST-OPERATIVE PAIN: ICD-10-CM

## 2022-10-10 DIAGNOSIS — J34.89 NASAL OBSTRUCTION: Primary | ICD-10-CM

## 2022-10-10 PROCEDURE — 99207 PR CDG-PROCEDURE CHARGE ONLY: CPT | Performed by: OTOLARYNGOLOGY

## 2022-10-10 PROCEDURE — 30802 ABLATE INF TURBINATE SUBMUC: CPT | Performed by: OTOLARYNGOLOGY

## 2022-10-10 RX ORDER — OXYCODONE AND ACETAMINOPHEN 5; 325 MG/1; MG/1
1 TABLET ORAL EVERY 6 HOURS PRN
Qty: 5 TABLET | Refills: 0 | Status: SHIPPED | OUTPATIENT
Start: 2022-10-10 | End: 2022-10-13

## 2022-10-10 ASSESSMENT — PAIN SCALES - GENERAL: PAINLEVEL: NO PAIN (0)

## 2022-10-10 NOTE — PATIENT INSTRUCTIONS
"You were seen in the clinic today by Dr. Winkler. If you have any questions or concerns after your appointment, please call the clinic at [phone]. Press \"1\" for scheduling, press \"3\" for nurse advice.    2.   Plan to return the clinic in 3-4 weeks.       Loren Estes LPN  [facility]  Department of Otolaryngology  [phone]           Today you had an in clinic turbinate reduction also known as radiofrequency turbinate reduction with submucosal cautery.    The following has been recommended to you based upon today's procedure:   1. No hard nose blowing for 24 hours after the procedure.   2. No strenuous activity for the 24 hours after the procedure.   3. It is normal to feel more congested for 1-2 weeks after the procedure as the procedure takes it's full effect. Most people report that the full effect of this procedure can be felt around 4 weeks post procedure.   4. Start irrigating your nose on both sides with a saline solution or sinus rinse. We recommend performing these three times a day during week 1, two times a day during week 2, and one time daily during week 3. We recommend that you use 2-3 sprays on each side of your nose. You can buy saline sprays over-the-counter at most drug stores. Some national  brands include: Saline, Ocean Dendron and [place]. Many pharmacies and stores carry  their own brands. Many pharmacies such as [facility] also carry  preservative-free saline sprays.   5. We would like you to follow up in 3-4 weeks in clinic with the provider to evaluate the effectiveness of this treatment.    "

## 2022-10-10 NOTE — PROGRESS NOTES
HISTORY OF PRESENT ILLNESS:  The patient is here today for left inferior turbinate reduction.    PROCEDURE:  After informed consent was obtained, the left side of the nose is topically anesthetized with lidocaine and Afrin spray.  The left inferior turbinate was then injected with 1% lidocaine with 1:100,000 epinephrine.  The [employer]ix radiofrequency instrument is then used under direct visualization to do a submucosal ablation of the left inferior turbinate tissues.  The left inferior turbinate was then outfractured.    ASSESSMENT AND PLAN: The patient is then observed for 5 minutes and is doing well and discharged home and will follow up in three to four weeks.

## 2022-10-10 NOTE — LETTER
10/10/2022     RE: Sarah العلي  [address]  [place] [address]     Dear Colleague,    Thank you for referring your patient, Sarah العلي, to the [facility] EAR NOSE AND THROAT CLINIC [place] at [facility]. Please see a copy of my visit note below.    HISTORY OF PRESENT ILLNESS:  The patient is here today for left inferior turbinate reduction.    PROCEDURE:  After informed consent was obtained, the left side of the nose is topically anesthetized with lidocaine and Afrin spray.  The left inferior turbinate was then injected with 1% lidocaine with 1:100,000 epinephrine.  The Olympus radiofrequency instrument is then used under direct visualization to do a submucosal ablation of the left inferior turbinate tissues.  The left inferior turbinate was then outfractured.    ASSESSMENT AND PLAN: The patient is then observed for 5 minutes and is doing well and discharged home and will follow up in three to four weeks.    Again, thank you for allowing me to participate in the care of your patient.      Sincerely,    Israel Winkler MD

## 2022-10-10 NOTE — NURSING NOTE
"Blood pressure 138/80, pulse 74, temperature 96.9  F (36.1  C), temperature source Temporal, height 1.725 m (5' 7.91\"), weight 56.6 kg (124 lb 11.2 oz), SpO2 99 %, not currently breastfeeding.    Aurea Russo LPN    "

## 2022-10-11 ENCOUNTER — MYC MEDICAL ADVICE (OUTPATIENT)
Dept: CARDIOLOGY | Facility: CLINIC | Age: 72
End: 2022-10-11

## 2022-11-07 ENCOUNTER — OFFICE VISIT (OUTPATIENT)
Dept: OTOLARYNGOLOGY | Facility: CLINIC | Age: 72
End: 2022-11-07
Payer: COMMERCIAL

## 2022-11-07 VITALS
OXYGEN SATURATION: 100 % | SYSTOLIC BLOOD PRESSURE: 131 MMHG | DIASTOLIC BLOOD PRESSURE: 79 MMHG | BODY MASS INDEX: 18.9 KG/M2 | TEMPERATURE: 96.6 F | WEIGHT: 124 LBS | HEART RATE: 75 BPM | RESPIRATION RATE: 16 BRPM

## 2022-11-07 DIAGNOSIS — J34.89 NASAL OBSTRUCTION: Primary | ICD-10-CM

## 2022-11-07 PROCEDURE — 99212 OFFICE O/P EST SF 10 MIN: CPT | Performed by: OTOLARYNGOLOGY

## 2022-11-07 ASSESSMENT — PAIN SCALES - GENERAL: PAINLEVEL: NO PAIN (0)

## 2022-11-07 NOTE — PATIENT INSTRUCTIONS
You were seen in the ENT Clinic today by Dr. Winkler. If you have any questions or concerns after your appointment, please contact us (see below)      2.   Please return to clinic in 6 weeks.         How to Contact Us:  Send a RTB-Media message to your provider. Our team will respond to you via RTB-Media. Occasionally, we will need to call you to get further information.  For urgent matters (Monday-Friday), call the ENT Clinic: [phone] and speak with a call center team member - they will route your call appropriately.   If you'd like to speak directly with a nurse, please find our contact information below. We do our best to check voicemail frequently throughout the day, and will work to call you back within 1-2 days. For urgent matters, please use the general clinic phone numbers listed above.      Lauren APPIAH RN  ENT RN Care Coordinator  Direct: [phone]  Loren BROWNING LPN  Direct: [phone]

## 2022-11-07 NOTE — PROGRESS NOTES
HISTORY OF PRESENT ILLNESS:  Sarah is back to see us again today after her in-office turbinate ablation on the left side.  She has been doing well.  Pain is well controlled.  No bleeding.    PHYSICAL EXAMINATION:  Exam shows some crusting associated with the head of the left inferior turbinate, which is removed.  Her breathing feels excellent.    PLAN:  Follow up in six weeks if she has any further concerns, otherwise as needed.  Encouraged good nasal hydration for the next couple of weeks.

## 2022-12-12 ENCOUNTER — OFFICE VISIT (OUTPATIENT)
Dept: OTOLARYNGOLOGY | Facility: CLINIC | Age: 72
End: 2022-12-12
Payer: COMMERCIAL

## 2022-12-12 VITALS
DIASTOLIC BLOOD PRESSURE: 62 MMHG | RESPIRATION RATE: 16 BRPM | HEART RATE: 76 BPM | TEMPERATURE: 96.6 F | BODY MASS INDEX: 18.9 KG/M2 | SYSTOLIC BLOOD PRESSURE: 123 MMHG | OXYGEN SATURATION: 100 % | WEIGHT: 124 LBS

## 2022-12-12 DIAGNOSIS — J34.89 NASAL OBSTRUCTION: Primary | ICD-10-CM

## 2022-12-12 PROCEDURE — 99213 OFFICE O/P EST LOW 20 MIN: CPT | Performed by: OTOLARYNGOLOGY

## 2022-12-12 ASSESSMENT — PAIN SCALES - GENERAL: PAINLEVEL: NO PAIN (0)

## 2022-12-12 NOTE — LETTER
12/12/2022       RE: Sarah العلي  [address]  [place] [address]     Dear Colleague,    Thank you for referring your patient, Sarah العلي, to the [facility] EAR NOSE AND THROAT CLINIC [place] at [facility]. Please see a copy of my visit note below.    HISTORY OF PRESENT ILLNESS:  Sarah is here to see us again today.  She feels that her breathing is acceptable to her nose.  It is better than it was prior to her turbinate reduction.  She also reports that the left side of the nose is slightly more congested than the right.    PHYSICAL EXAMINATION:  On examination, she is in no acute distress.  Normal mood and affect, normal ability to communicate.  Alert and appropriate.  Head is normocephalic.  Cranial nerve VII is House-Brackmann I/VI bilaterally.  Breathing without any difficulty or stridor.      Examination of the nose shows well reduced inferior turbinates and mild septal deflection to the left.  No evidence of infection.    ASSESSMENT:  A 72-year-old with some persistent nasal congestion, but overall good improvement after turbinate reduction.    PLAN:  At this point, we recommend watchful waiting and follow up as needed in the future.          Again, thank you for allowing me to participate in the care of your patient.      Sincerely,    Israel Winkler MD      
Normal for race

## 2022-12-12 NOTE — PROGRESS NOTES
HISTORY OF PRESENT ILLNESS:  Sarah is here to see us again today.  She feels that her breathing is acceptable to her nose.  It is better than it was prior to her turbinate reduction.  She also reports that the left side of the nose is slightly more congested than the right.    PHYSICAL EXAMINATION:  On examination, she is in no acute distress.  Normal mood and affect, normal ability to communicate.  Alert and appropriate.  Head is normocephalic.  Cranial nerve VII is House-Brackmann I/VI bilaterally.  Breathing without any difficulty or stridor.      Examination of the nose shows well reduced inferior turbinates and mild septal deflection to the left.  No evidence of infection.    ASSESSMENT:  A 72-year-old with some persistent nasal congestion, but overall good improvement after turbinate reduction.    PLAN:  At this point, we recommend watchful waiting and follow up as needed in the future.

## 2022-12-12 NOTE — PATIENT INSTRUCTIONS
You were seen in the ENT Clinic today by Dr. Winkler. If you have any questions or concerns after your appointment, please contact us (see below)      2.   Please return to clinic as needed.         How to Contact Us:  Send a QPSoftware message to your provider. Our team will respond to you via QPSoftware. Occasionally, we will need to call you to get further information.  For urgent matters (Monday-Friday), call the ENT Clinic: [phone] and speak with a call center team member - they will route your call appropriately.   If you'd like to speak directly with a nurse, please find our contact information below. We do our best to check voicemail frequently throughout the day, and will work to call you back within 1-2 days. For urgent matters, please use the general clinic phone numbers listed above.      Lauren APPIAH RN  ENT RN Care Coordinator  Direct: [phone]  Loren BROWNING LPN  Direct: [phone]

## 2022-12-19 ENCOUNTER — LAB (OUTPATIENT)
Dept: LAB | Facility: CLINIC | Age: 72
End: 2022-12-19
Payer: COMMERCIAL

## 2022-12-19 ENCOUNTER — OFFICE VISIT (OUTPATIENT)
Dept: INTERNAL MEDICINE | Facility: CLINIC | Age: 72
End: 2022-12-19
Payer: COMMERCIAL

## 2022-12-19 VITALS
OXYGEN SATURATION: 99 % | RESPIRATION RATE: 16 BRPM | SYSTOLIC BLOOD PRESSURE: 130 MMHG | HEART RATE: 98 BPM | WEIGHT: 127.8 LBS | DIASTOLIC BLOOD PRESSURE: 84 MMHG | HEIGHT: 67 IN | BODY MASS INDEX: 20.06 KG/M2

## 2022-12-19 DIAGNOSIS — I50.30 HEART FAILURE WITH PRESERVED EJECTION FRACTION, UNSPECIFIED HF CHRONICITY (H): ICD-10-CM

## 2022-12-19 DIAGNOSIS — D69.2 PURPURA (H): Primary | ICD-10-CM

## 2022-12-19 DIAGNOSIS — R06.02 SOB (SHORTNESS OF BREATH): ICD-10-CM

## 2022-12-19 DIAGNOSIS — G44.209 MUSCLE TENSION HEADACHE: ICD-10-CM

## 2022-12-19 DIAGNOSIS — I72.0 ANEURYSM OF CAROTID ARTERY (H): ICD-10-CM

## 2022-12-19 LAB
ALBUMIN SERPL BCG-MCNC: 4.7 G/DL (ref 3.5–5.2)
ALP SERPL-CCNC: 31 U/L (ref 35–104)
ALT SERPL W P-5'-P-CCNC: 14 U/L (ref 10–35)
ANION GAP SERPL CALCULATED.3IONS-SCNC: 9 MMOL/L (ref 7–15)
AST SERPL W P-5'-P-CCNC: 23 U/L (ref 10–35)
BASOPHILS # BLD AUTO: 0 10E3/UL (ref 0–0.2)
BASOPHILS NFR BLD AUTO: 1 %
BILIRUB SERPL-MCNC: 0.2 MG/DL
BUN SERPL-MCNC: 18.2 MG/DL (ref 8–23)
CALCIUM SERPL-MCNC: 9.8 MG/DL (ref 8.8–10.2)
CHLORIDE SERPL-SCNC: 98 MMOL/L (ref 98–107)
CREAT SERPL-MCNC: 0.83 MG/DL (ref 0.51–0.95)
DACRYOCYTES BLD QL SMEAR: SLIGHT
DEPRECATED HCO3 PLAS-SCNC: 26 MMOL/L (ref 22–29)
EOSINOPHIL # BLD AUTO: 0 10E3/UL (ref 0–0.7)
EOSINOPHIL NFR BLD AUTO: 0 %
ERYTHROCYTE [DISTWIDTH] IN BLOOD BY AUTOMATED COUNT: 12.3 % (ref 10–15)
GFR SERPL CREATININE-BSD FRML MDRD: 74 ML/MIN/1.73M2
GLUCOSE SERPL-MCNC: 107 MG/DL (ref 70–99)
HCT VFR BLD AUTO: 38.8 % (ref 35–47)
HGB BLD-MCNC: 12.9 G/DL (ref 11.7–15.7)
IMM GRANULOCYTES # BLD: 0 10E3/UL
IMM GRANULOCYTES NFR BLD: 1 %
INR PPP: 1.01 (ref 0.85–1.15)
LYMPHOCYTES # BLD AUTO: 0.4 10E3/UL (ref 0.8–5.3)
LYMPHOCYTES NFR BLD AUTO: 11 %
MCH RBC QN AUTO: 30.4 PG (ref 26.5–33)
MCHC RBC AUTO-ENTMCNC: 33.2 G/DL (ref 31.5–36.5)
MCV RBC AUTO: 91 FL (ref 78–100)
MONOCYTES # BLD AUTO: 0.7 10E3/UL (ref 0–1.3)
MONOCYTES NFR BLD AUTO: 21 %
NEUTROPHILS # BLD AUTO: 2.3 10E3/UL (ref 1.6–8.3)
NEUTROPHILS NFR BLD AUTO: 66 %
NRBC # BLD AUTO: 0 10E3/UL
NRBC BLD AUTO-RTO: 0 /100
NT-PROBNP SERPL-MCNC: 492 PG/ML (ref 0–900)
PLAT MORPH BLD: ABNORMAL
PLATELET # BLD AUTO: 238 10E3/UL (ref 150–450)
POTASSIUM SERPL-SCNC: 3.9 MMOL/L (ref 3.4–5.3)
PROT SERPL-MCNC: 7.5 G/DL (ref 6.4–8.3)
RBC # BLD AUTO: 4.25 10E6/UL (ref 3.8–5.2)
RBC MORPH BLD: ABNORMAL
SODIUM SERPL-SCNC: 133 MMOL/L (ref 136–145)
WBC # BLD AUTO: 3.5 10E3/UL (ref 4–11)

## 2022-12-19 PROCEDURE — 80053 COMPREHEN METABOLIC PANEL: CPT | Performed by: PATHOLOGY

## 2022-12-19 PROCEDURE — 36415 COLL VENOUS BLD VENIPUNCTURE: CPT | Performed by: PATHOLOGY

## 2022-12-19 PROCEDURE — 83880 ASSAY OF NATRIURETIC PEPTIDE: CPT | Performed by: PATHOLOGY

## 2022-12-19 PROCEDURE — 85025 COMPLETE CBC W/AUTO DIFF WBC: CPT | Performed by: PATHOLOGY

## 2022-12-19 PROCEDURE — 99214 OFFICE O/P EST MOD 30 MIN: CPT | Performed by: PEDIATRICS

## 2022-12-19 PROCEDURE — 85610 PROTHROMBIN TIME: CPT | Performed by: PATHOLOGY

## 2022-12-19 NOTE — NURSING NOTE
"Sarah العلي is a 72 year old female patient that presents today in clinic for the following:    Chief Complaint   Patient presents with     Derm Problem     Lesion     She first noticed it this AM, denies any pain.     Headache     She reports a headache the last ten days.      Results     Heart Problem     She reports a history of heart failure.     The patient's allergies and medications were reviewed as noted. A set of vitals were recorded as noted without incident: /84 (BP Location: Right arm, Patient Position: Sitting, Cuff Size: Adult Regular)   Pulse 98   Resp 16   Ht 1.689 m (5' 6.5\")   Wt 58 kg (127 lb 12.8 oz)   SpO2 99%   Breastfeeding No   BMI 20.32 kg/m  . The patient does not have any other questions for the provider.    Man Dougherty, EMT  12:56 PM 12/19/2022  "

## 2022-12-19 NOTE — Clinical Note
Darwin Benson, I saw your patient for a new, isolated purpuric lesion. Likely idiopathic, CBC/INR are normal.  I also referred her to PT for muscle tension headaches.  Thanks  Eusebio

## 2022-12-19 NOTE — PROGRESS NOTES
"SUBJECTIVE: Sarah العلي is a 72 year old female who presents with a new lesion under her left eye which she described as purpura.  She is a former pediatric nurse.    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  She is starting on Jardiance for HFpEF next month.  She is not on aspirin or blood thinners. No nose bleeds, blood in urine or stool, no bleeding gums or conjunctival hemorrhage history.   She has a history of a 5-6 mm carotid aneurysm on the right- followed at [facility].  She has a hx of migraine headaches a few times per week.     She currently has a headache, described as a band around her head for the past 7-10 days.  It does cause some sleep disruption.  She is not taking any medication over the past day, but has taken some Tylenol, but has not taken any NSAID's.  She feels this is a different type of headache.      She has a surveillance CT chest for follow up of lung nodules tomorrow at [facility].    OBJECTIVE: /84 (BP Location: Right arm, Patient Position: Sitting, Cuff Size: Adult Regular)   Pulse 98   Resp 16   Ht 1.689 m (5' 6.5\")   Wt 58 kg (127 lb 12.8 oz)   SpO2 99%   Breastfeeding No   BMI 20.32 kg/m     /84 (BP Location: Right arm, Patient Position: Sitting, Cuff Size: Adult Regular)   Pulse 98   Resp 16   Ht 1.689 m (5' 6.5\")   Wt 58 kg (127 lb 12.8 oz)   SpO2 99%   Breastfeeding No   BMI 20.32 kg/m    GENERAL APPEARANCE: healthy, alert and no distress  EYES: EOMI,  PERRL  HENT: ear canals and TM's normal and nose and mouth without ulcers or lesions  RESP: lungs clear to auscultation - no rales, rhonchi or wheezes  CV: regular rates and rhythm, normal S1 S2, no S3 or S4 and no murmur, click or rub -  ABDOMEN:  soft, nontender, no HSM or masses and bowel sounds normal  SKIN: Below left eye, near nasal bridge is a non blanching purpuric lesion, about 0.5 cm  Ext: warm.  Edema NO    ASSESSMENT:  Sarah STILES" Severiano is a 72 year old female who presents with a new purpuric lesion below left eye and headache   (D69.2) Purpura (H)  (primary encounter diagnosis)  Comment: discussed likely benign nature of this as she has no other purpuric lesions on her extremities or hx of bleeding.  Will check CBC and INR  Plan: CBC with platelets differential, INR, CANCELED:        Basic metabolic panel        Follow up if she notes other lesions.  We discussed this is unrelated to her carotid artery aneurysm history.     (G44.209) Muscle tension headache  Comment: Discussed benefit of a trial of PT which she is open to for ROM exercises, trigger point evaluation.    Plan: Physical Therapy Referral        Follow up with Dr. Cachorro Torres as needed.

## 2022-12-21 ENCOUNTER — TELEPHONE (OUTPATIENT)
Dept: INTERNAL MEDICINE | Facility: CLINIC | Age: 72
End: 2022-12-21

## 2022-12-21 ENCOUNTER — VIRTUAL VISIT (OUTPATIENT)
Dept: FAMILY MEDICINE | Facility: CLINIC | Age: 72
End: 2022-12-21
Payer: COMMERCIAL

## 2022-12-21 DIAGNOSIS — D69.2 PURPURA (H): Primary | ICD-10-CM

## 2022-12-21 DIAGNOSIS — U07.1 INFECTION DUE TO 2019 NOVEL CORONAVIRUS: Primary | ICD-10-CM

## 2022-12-21 PROCEDURE — 99204 OFFICE O/P NEW MOD 45 MIN: CPT | Mod: 95 | Performed by: NURSE PRACTITIONER

## 2022-12-21 RX ORDER — ALBUTEROL SULFATE 90 UG/1
2 AEROSOL, METERED RESPIRATORY (INHALATION) EVERY 6 HOURS PRN
Qty: 18 G | Refills: 1 | Status: SHIPPED | OUTPATIENT
Start: 2022-12-21

## 2022-12-21 RX ORDER — NIRMATRELVIR AND RITONAVIR 300-100 MG
2 KIT ORAL 2 TIMES DAILY
Qty: 30 EACH | Refills: 0 | Status: SHIPPED | OUTPATIENT
Start: 2022-12-21 | End: 2023-01-23

## 2022-12-21 NOTE — TELEPHONE ENCOUNTER
Left a message for the patient that she had labs done on 12/19/22. Result comments indicate recheck of labs in 4-8 weeks. Pt is not quite due for recheck, will send to Dr. Reyes to advise on which labs he would like rechecked. Michelle Rucker LPN 12/21/2022 9:14 AM

## 2022-12-21 NOTE — PROGRESS NOTES
"Magdiel is a 72 year old who is being evaluated via a billable video visit.      How would you like to obtain your AVS? MyChart  If the video visit is dropped, the invitation should be resent by: Text to cell phone: [phone]  Will anyone else be joining your video visit? No            Subjective   Magdiel is a 72 year old presenting for the following health issues: COVID positive.    HPI     72-year-old female presents for video visit to discuss COVID-19 infection. Her PMH includes pulmonary nodules (Follows [facility]), Cerebral anuerysm, Pulm. HTN, HFpEF (currently not on jardiance due to insurance issues, will start 23.) Patient reports on 22 she developed a headache, sore throat, and dry/hacking cough. Yesterday, the patient developed nasal congestion and felt that the cough became \" deeper.\" Today, she reports that she feels weakness/fatigue. She also endorses nausea and diarrhea today. She denies CP but endorses SOBOE though she feels that her SOBOE is stable (she reports that she has chronic SOBOE.) She tested positive with a home COVID test and is interested in starting paxlovid. She has an inhaler but reports that it is . No other acute concerns/symptoms at time of exam.      Review of Systems   Constitutional, HEENT, cardiovascular, pulmonary, gi and gu systems are negative, except as otherwise noted.          Objective           Vitals:  No vitals were obtained today due to virtual visit.    Physical Exam   GENERAL: Healthy, alert and no distress  EYES: Eyes grossly normal to inspection.  No discharge or erythema, or obvious scleral/conjunctival abnormalities.  RESP: No audible wheeze or visible cyanosis.  No visible retractions or increased work of breathing. Intermittent cough noted.  SKIN: Visible skin clear. No significant rash, abnormal pigmentation or lesions.  NEURO: Cranial nerves grossly intact.  Mentation and speech appropriate for age.  PSYCH: Mentation appears normal, affect " normal/bright, judgement and insight intact, normal speech and appearance well-groomed.    Recent Results (from the past 72 hour(s))   INR    Collection Time: 12/19/22  1:49 PM   Result Value Ref Range    INR 1.01 0.85 - 1.15   Comprehensive metabolic panel    Collection Time: 12/19/22  1:49 PM   Result Value Ref Range    Sodium 133 (L) 136 - 145 mmol/L    Potassium 3.9 3.4 - 5.3 mmol/L    Chloride 98 98 - 107 mmol/L    Carbon Dioxide (CO2) 26 22 - 29 mmol/L    Anion Gap 9 7 - 15 mmol/L    Urea Nitrogen 18.2 8.0 - 23.0 mg/dL    Creatinine 0.83 0.51 - 0.95 mg/dL    Calcium 9.8 8.8 - 10.2 mg/dL    Glucose 107 (H) 70 - 99 mg/dL    Alkaline Phosphatase 31 (L) 35 - 104 U/L    AST 23 10 - 35 U/L    ALT 14 10 - 35 U/L    Protein Total 7.5 6.4 - 8.3 g/dL    Albumin 4.7 3.5 - 5.2 g/dL    Bilirubin Total 0.2 <=1.2 mg/dL    GFR Estimate 74 >60 mL/min/1.73m2   N terminal pro BNP outpatient    Collection Time: 12/19/22  1:49 PM   Result Value Ref Range    N Terminal Pro BNP Outpatient 492 0 - 900 pg/mL   CBC with platelets and differential    Collection Time: 12/19/22  1:49 PM   Result Value Ref Range    WBC Count 3.5 (L) 4.0 - 11.0 10e3/uL    RBC Count 4.25 3.80 - 5.20 10e6/uL    Hemoglobin 12.9 11.7 - 15.7 g/dL    Hematocrit 38.8 35.0 - 47.0 %    MCV 91 78 - 100 fL    MCH 30.4 26.5 - 33.0 pg    MCHC 33.2 31.5 - 36.5 g/dL    RDW 12.3 10.0 - 15.0 %    Platelet Count 238 150 - 450 10e3/uL    % Neutrophils 66 %    % Lymphocytes 11 %    % Monocytes 21 %    % Eosinophils 0 %    % Basophils 1 %    % Immature Granulocytes 1 %    NRBCs per 100 WBC 0 <1 /100    Absolute Neutrophils 2.3 1.6 - 8.3 10e3/uL    Absolute Lymphocytes 0.4 (L) 0.8 - 5.3 10e3/uL    Absolute Monocytes 0.7 0.0 - 1.3 10e3/uL    Absolute Eosinophils 0.0 0.0 - 0.7 10e3/uL    Absolute Basophils 0.0 0.0 - 0.2 10e3/uL    Absolute Immature Granulocytes 0.0 <=0.4 10e3/uL    Absolute NRBCs 0.0 10e3/uL   RBC and Platelet Morphology    Collection Time: 12/19/22  1:49 PM    Result Value Ref Range    Platelet Assessment  Automated Count Confirmed. Platelet morphology is normal.     Automated Count Confirmed. Platelet morphology is normal.    Teardrop Cells Slight (A) None Seen    RBC Morphology Confirmed RBC Indices      Assessment/Plan  1. Infection due to 2019 novel coronavirus  Patient in NAD throughout video visit but does endorses worsening symptoms since Monday.  Patient is a good candidate for Paxlovid in the setting of symptom onset < 5 days, age >65 with underlying Pulm, HTN and CHFpEF, We discussed the risks and benefits of Paxlovid including rebound COVID, patient opted to start rx. In addition to Paxlovid, sent a Rx for an updated rescue inhaler for her SOBOE.   Advised patent to seek urgent/emergent care if she develops intractable fevers/ nausea//vomiting, CP,  SOB at rest, or any other escalating/worrisome symptoms.    - nirmatrelvir and ritonavir (PAXLOVID, 300/100,) therapy pack; Take 2 tablets by mouth 2 times daily  Dispense: 30 each; Refill: 0  - albuterol (PROAIR HFA/PROVENTIL HFA/VENTOLIN HFA) 108 (90 Base) MCG/ACT inhaler; Inhale 2 puffs into the lungs every 6 hours as needed for shortness of breath, wheezing or cough  Dispense: 18 g; Refill: 1    Follow-up with PCP after COVID infection resolves, sooner if needed.      All questions/concerns addressed. Patient stated understanding/agreement to plan of care.    KOFI Robledo, CNP  [facility] [employer]      Video-Visit Details    Video Start Time: 0806am    Type of service:  Video Visit    Video End Time:0821am    Originating Location (pt. Location): Home    Distant Location (provider location):  On-site    Platform used for Video Visit: RobertWell

## 2022-12-21 NOTE — TELEPHONE ENCOUNTER
[facility] Call Center    Phone Message    May a detailed message be left on voicemail: yes     Reason for Call: Other: Patient calling regarding labs she is attempting to schedule ordered by Dr. Reyes. Writed expained there are no orders in the system for any labs by that doctor. She would pineda these orders placed or to receive a call back to discuss. Thank you.     Action Taken: Message routed to:  [facility]    Travel Screening: Not Applicable

## 2022-12-23 NOTE — TELEPHONE ENCOUNTER
I have signed the order for the CBC, she does not need an INR again as this was normal.  Thanks Eusebio

## 2022-12-26 ENCOUNTER — MYC MEDICAL ADVICE (OUTPATIENT)
Dept: INTERNAL MEDICINE | Facility: CLINIC | Age: 72
End: 2022-12-26

## 2023-01-05 ENCOUNTER — OFFICE VISIT (OUTPATIENT)
Dept: INTERNAL MEDICINE | Facility: CLINIC | Age: 73
End: 2023-01-05
Payer: COMMERCIAL

## 2023-01-05 VITALS
DIASTOLIC BLOOD PRESSURE: 82 MMHG | WEIGHT: 126.5 LBS | BODY MASS INDEX: 20.11 KG/M2 | HEART RATE: 84 BPM | OXYGEN SATURATION: 100 % | SYSTOLIC BLOOD PRESSURE: 140 MMHG

## 2023-01-05 DIAGNOSIS — L30.8 OTHER ECZEMA: Primary | ICD-10-CM

## 2023-01-05 PROCEDURE — 99213 OFFICE O/P EST LOW 20 MIN: CPT | Mod: GC | Performed by: STUDENT IN AN ORGANIZED HEALTH CARE EDUCATION/TRAINING PROGRAM

## 2023-01-05 NOTE — PROGRESS NOTES
CC: rash      HPI:  72F with PMH of migraine, carotid aneurysm, erythema multiforme presents for evaluation of rash on lower extremities. This began a day or two ago. There is mild itching associated with the rash. She hasn't tried any treatment for this yet. She is also curious why she had a slightly low WBC (3.5) count on her recent labs after testing positive for covid. She was concerned the rash together with the low WBC was c/f a systemic illness. She has recovered well from covid.    PMH: I have personally reviewed the patient's medical history, medications, and allergies.      BP (!) 140/82 (BP Location: Right arm, Patient Position: Sitting, Cuff Size: Adult Regular)   Pulse 84   Wt 57.4 kg (126 lb 8 oz)   SpO2 100%   BMI 20.11 kg/m    Physical Examination:    General:  Alert, NAD  Lungs:  CTAB.   C/V:  RRR with no m/r/g.    Neuro: Alert and conversant, face symmetric. No gross neurologic deficits.  Skin:  Scattered patches of sandpaper textured erythema on lower extrmeities, no e/o petechiae  Extremities:  No peripheral edema  Psych:  Alert and oriented. Appropriate affect.        A&P:  Eczema  Discussed to try emollients and topical steroid, rash if very mild. Patient mostly wanted reassurance this did not look like petechiae. If this doesn't work for her she should return.    Leukopenia associated with viral illness  Mild, likely associated with covid infection. Can recheck in a few weeks to see if normalized. Recovered from covid well. No ongoing symptoms.    Veda Coughlin MD  IM Resident PGY-3    This patient was discussed with Dr Zuleta.

## 2023-01-05 NOTE — NURSING NOTE
Sarah العلي is a 72 year old female that presents in clinic today for the following:     Chief Complaint   Patient presents with     Derm Problem     Pt has rash on legs and torso since yesterday       The patient's allergies and medications were reviewed. The patient's vitals were obtained without incident. The patient does not have any other questions for the provider.     Adela Daly, EMT at 12:21 PM on 1/5/2023.  Primary Care Clinic: [phone]

## 2023-01-12 ENCOUNTER — LAB (OUTPATIENT)
Dept: LAB | Facility: CLINIC | Age: 73
End: 2023-01-12
Payer: COMMERCIAL

## 2023-01-12 DIAGNOSIS — R06.02 SOB (SHORTNESS OF BREATH): ICD-10-CM

## 2023-01-12 DIAGNOSIS — D69.2 PURPURA (H): ICD-10-CM

## 2023-01-12 DIAGNOSIS — M81.0 SENILE OSTEOPOROSIS: ICD-10-CM

## 2023-01-12 DIAGNOSIS — I50.30 HEART FAILURE WITH PRESERVED EJECTION FRACTION, UNSPECIFIED HF CHRONICITY (H): ICD-10-CM

## 2023-01-12 LAB
ANION GAP SERPL CALCULATED.3IONS-SCNC: 13 MMOL/L (ref 7–15)
BASOPHILS # BLD AUTO: 0 10E3/UL (ref 0–0.2)
BASOPHILS NFR BLD AUTO: 1 %
BUN SERPL-MCNC: 21.7 MG/DL (ref 8–23)
CALCIUM SERPL-MCNC: 9.6 MG/DL (ref 8.8–10.2)
CHLORIDE SERPL-SCNC: 101 MMOL/L (ref 98–107)
CREAT SERPL-MCNC: 0.81 MG/DL (ref 0.51–0.95)
DEPRECATED HCO3 PLAS-SCNC: 22 MMOL/L (ref 22–29)
EOSINOPHIL # BLD AUTO: 0.1 10E3/UL (ref 0–0.7)
EOSINOPHIL NFR BLD AUTO: 1 %
ERYTHROCYTE [DISTWIDTH] IN BLOOD BY AUTOMATED COUNT: 12.3 % (ref 10–15)
GFR SERPL CREATININE-BSD FRML MDRD: 77 ML/MIN/1.73M2
GLUCOSE SERPL-MCNC: 93 MG/DL (ref 70–99)
HCT VFR BLD AUTO: 38 % (ref 35–47)
HGB BLD-MCNC: 12.7 G/DL (ref 11.7–15.7)
IMM GRANULOCYTES # BLD: 0 10E3/UL
IMM GRANULOCYTES NFR BLD: 0 %
LYMPHOCYTES # BLD AUTO: 2 10E3/UL (ref 0.8–5.3)
LYMPHOCYTES NFR BLD AUTO: 44 %
MCH RBC QN AUTO: 30.2 PG (ref 26.5–33)
MCHC RBC AUTO-ENTMCNC: 33.4 G/DL (ref 31.5–36.5)
MCV RBC AUTO: 91 FL (ref 78–100)
MONOCYTES # BLD AUTO: 0.5 10E3/UL (ref 0–1.3)
MONOCYTES NFR BLD AUTO: 10 %
NEUTROPHILS # BLD AUTO: 2 10E3/UL (ref 1.6–8.3)
NEUTROPHILS NFR BLD AUTO: 44 %
NRBC # BLD AUTO: 0 10E3/UL
NRBC BLD AUTO-RTO: 0 /100
PLATELET # BLD AUTO: 258 10E3/UL (ref 150–450)
POTASSIUM SERPL-SCNC: 4.5 MMOL/L (ref 3.4–5.3)
RBC # BLD AUTO: 4.2 10E6/UL (ref 3.8–5.2)
SODIUM SERPL-SCNC: 136 MMOL/L (ref 136–145)
WBC # BLD AUTO: 4.7 10E3/UL (ref 4–11)

## 2023-01-12 PROCEDURE — 80048 BASIC METABOLIC PNL TOTAL CA: CPT | Performed by: PATHOLOGY

## 2023-01-12 PROCEDURE — 36415 COLL VENOUS BLD VENIPUNCTURE: CPT | Performed by: PATHOLOGY

## 2023-01-12 PROCEDURE — 85025 COMPLETE CBC W/AUTO DIFF WBC: CPT | Performed by: PATHOLOGY

## 2023-02-21 ENCOUNTER — MYC MEDICAL ADVICE (OUTPATIENT)
Dept: FAMILY MEDICINE | Facility: CLINIC | Age: 73
End: 2023-02-21
Payer: COMMERCIAL

## 2023-02-21 DIAGNOSIS — M81.0 SENILE OSTEOPOROSIS: Primary | ICD-10-CM

## 2023-02-21 DIAGNOSIS — Z92.29 HX DRUG THERAPY: ICD-10-CM

## 2023-02-21 NOTE — TELEPHONE ENCOUNTER
2-21-23 signed prolia orders and blood work 2 wks later and pre-shot calcium order  Evelyn Hightower MD, PhD

## 2023-02-22 ENCOUNTER — TRANSFERRED RECORDS (OUTPATIENT)
Dept: HEALTH INFORMATION MANAGEMENT | Facility: CLINIC | Age: 73
End: 2023-02-22
Payer: COMMERCIAL

## 2023-03-10 ENCOUNTER — TELEPHONE (OUTPATIENT)
Dept: CARDIOLOGY | Facility: CLINIC | Age: 73
End: 2023-03-10
Payer: COMMERCIAL

## 2023-03-10 DIAGNOSIS — I50.30 HEART FAILURE WITH PRESERVED EJECTION FRACTION, UNSPECIFIED HF CHRONICITY (H): ICD-10-CM

## 2023-03-10 DIAGNOSIS — R06.02 SOB (SHORTNESS OF BREATH): ICD-10-CM

## 2023-03-10 NOTE — TELEPHONE ENCOUNTER
Patient called to report hypotension after starting Jardiance. BP's in the 90's/60's and is experiencing dizziness. VORB from Dr. Rees for hypotension Date: 3/10/2023 @ 1:48 PM. Okay to hold Jardiance at this time. Patient has a follow-up in April and will call in the interim for HTN greater than 130/80    Brigida Cabrales RN on 3/10/2023 at 1:49 PM

## 2023-03-14 NOTE — PROGRESS NOTES
ASSESSMENT:  This is a 72-year-old postmenopausal female who has osteoporosis by T-scores.  She has received in the past IV Reclast in 2019 and 2021.  We started Prolia in August 2022 based on her T-scores and report of loss of bone density in the spine.  She is due for Prolia.  She had several questions regarding Prolia which were answered.  Mainly she was concerned about the report of increased vertebral fractures when people stop Prolia.  She does not have any vertebral fractures and  I think her risk for this is lo.   I told her when she comes off  Prolia we would give her  IV Reclast to protect this  from happening.  She decided to go ahead with the Prolia.  We discussed calcium and vitamin D.  She will get blood work in 2 weeks.  I encouraged her to wait to get the DEXA until fall and then see see me after when her next Prolia would be due.    PLAN:  Get DXA in Sept and then see me after in Sept when next prolia is due   You need blood work now in 2 wks post prolia  You will need a calcium blood test next Sept BEFORE the prolia and then blood work after the prolia  Patient should take 1200mg of calcium/day in divided doses and vitamin D3 1000IU/day.    To schedule your appointment with imaging for your dexascan, please call [phone].          Thank you for allowing me to participate in the care of your patient.  Please do not hesitate to call with questions or concerns.    Sincerely,    Evelyn Hightower MD, PhD  CC Obi Ivory MD       Time note (e4, 30'): The total of my time (on the date of service) for this service was 31 minutes, including discussion/face-to-face, chart review, interpretation not otherwise reported, documentation, and updating of the computerized record.        Sarah is a  72 year old female post menopausal] [[GR2, [id number] that presents today for osteoporosis follow up patient was last seen 7/2022. She started prolia 8/2022 and comes back in F/up.  No side effects.      Referring Physician:Obi Ivory MD     HPI     Have you ever had a bone density test? Yes  Where = [facility] [place]  When = 4/2021 2020 2018   Spine Tscore = -0.5  LOSS -2.8%  Left neck Tscore = -2.5  Total left hip Tscore = -1.6  Right neck Tscore = -2.0  Total Right hip Tscore = -1.0  -  GAIN +2.9%  Have you received any x-ray dye or contrast in the last ten days? No  How many servings of dairy products do you consume per day? 2-3 Type: milk, green vegetables, sardines, cheese   Do you take a multi-vitamin daily? No  Do you take a vitamin D supplement? Yes 1000IU alternating with 2000IU/day   Do you take a calcium supplement daily?  No  Do you take a supplement containing strontium? No  Are you exposed to natural sunlight at least 20 minutes three times a week? Yes    Social History   reports that she quit smoking about 43 years ago. Her smoking use included cigarettes. She started smoking about 54 years ago. She has a 6.00 pack-year smoking history. She has never used smokeless tobacco. She reports that she does not currently use alcohol after a past usage of about 1.0 - 2.0 standard drink per week. She reports that she does not use drugs.  Do you smoke cigarettes? Reformed smoker   Do you exercise? Yes. Details: walking 3x/wk  - 45-60 minutes, no weights    Do you drink alcohol? Yes. Details: wine - few times/month     Medication History  Have you used any of the following medications?    Actonel (Risedronate): No              Aredia (Pamidronate): No              Boniva (Ibindronate): No              Didronil (Etidronate): No              Evista (Raloxifene): No              Fosamax (Alendronate): No              Forteo (Parathyroid hormone) injections: No              HCTZ (Thiazide): No              Calcitonin nasal spray: No              Reclast or Zometa (Zolendronate): 4/2019 and 4/2021  - cancelled the 3rd IV reclast in 4/2022 because has bone loss in jaw                Prolia (Denosumab): yes started 8/2022               HT: yes for 1 yr  20 yrs ago           Current Outpatient Medications   Medication Sig Dispense Refill     albuterol (PROAIR HFA/PROVENTIL HFA/VENTOLIN HFA) 108 (90 Base) MCG/ACT inhaler Inhale 2 puffs into the lungs every 6 hours as needed for shortness of breath, wheezing or cough 18 g 1     albuterol (PROAIR HFA/PROVENTIL HFA/VENTOLIN HFA) 108 (90 Base) MCG/ACT inhaler Inhale 2 puffs into the lungs as needed (Patient not taking: Reported on 2022) 8.5 g 3     Cholecalciferol (VITAMIN D3) 1000 UNITS CAPS Take 1 capsule by mouth daily       empagliflozin (JARDIANCE) 10 MG TABS tablet Take 1 tablet (10 mg) by mouth daily ON HOLD DT HYPOTENSION 3/10/23 90 tablet 3     fluocinonide (LIDEX) 0.05 % external gel Apply topically 2 times daily (Patient not taking: Reported on 2022) 15 g 3     Psyllium (METAMUCIL FREE & NATURAL) 43 % POWD             Allergies   Allergen Reactions     Atorvastatin Rash     Nickel Rash     Other reaction(s): *Unknown  Other reaction(s): *Unknown       Past Medical History    Family History   Problem Relation Age of Onset     Cerebrovascular Disease Father          CVA      Other - See Comments Mother          58 scleroderma     Heart Failure Maternal Grandfather      Heart Disease Maternal Grandfather         CHF     Diabetes Maternal Grandmother         Type 1     Cancer Paternal Grandmother         Endometrial CA of uterus     Breast Cancer Paternal Aunt      Diabetes Cousin         Type 1     Glaucoma No family hx of      Macular Degeneration No family hx of        ROS:  General: none  Head/Eyes: none  Ears/Nose/Throat: none  Cardiovascular: palpitations  Respiratory: shortness of breath  Gastrointestinal: none  Breast: none  Genitourinary: none  Sexual Function: none  Musculoskeletal: none  Skin: none  Neurological: none  Mental Health: none  Endocrine: temperature intolerance    Clinic Measurements  Vitals: /67 (BP Location: Right arm, Patient  Position: Sitting, Cuff Size: Adult Regular)   Pulse 74   Wt 57.6 kg (127 lb)   SpO2 98%   BMI 20.19 kg/m    BMI= Body mass index is 20.19 kg/m .    Physical exam  Constitutional: Well appearing woman in no acute distress.   Psychological: appropriate mood.  Neck: No thyroidmegaly.  no carotid bruits.  Cardiovascular: regular rate and rhythm, normal S1 and S2, no murmurs, rubs or gallops, peripheral pulses full and symmetric   Respiratory: clear to auscultation, no wheezes or crackles, normal breath sounds.  Musculoskeletal: full range of motion, no edema and motor strength is equal in the upper and lower extremities    Spine: Straight, not tender, Flexion good, Extension good, Lateral movement good, Rotational movement good  Skin: no concerning lesions, no jaundice.  Neurological: cranial nerves intact, normal strength, reflexes at patella and biceps normal, normal gait, no tremor.     LAB  Vertebra; Fracture Assessment: NA  Dexa Scan: 7/2022    FRAX Assessment Tool: [N/A, on prolia  Risk Factors: PM, T scores, age,  Reformed smoker +FHx     Evelyn Hightower MD, PhD

## 2023-03-15 ENCOUNTER — OFFICE VISIT (OUTPATIENT)
Dept: FAMILY MEDICINE | Facility: CLINIC | Age: 73
End: 2023-03-15
Payer: COMMERCIAL

## 2023-03-15 ENCOUNTER — LAB (OUTPATIENT)
Dept: LAB | Facility: CLINIC | Age: 73
End: 2023-03-15
Payer: COMMERCIAL

## 2023-03-15 VITALS
DIASTOLIC BLOOD PRESSURE: 67 MMHG | OXYGEN SATURATION: 98 % | WEIGHT: 127 LBS | SYSTOLIC BLOOD PRESSURE: 132 MMHG | HEART RATE: 74 BPM | BODY MASS INDEX: 20.19 KG/M2

## 2023-03-15 DIAGNOSIS — M81.0 SENILE OSTEOPOROSIS: ICD-10-CM

## 2023-03-15 DIAGNOSIS — M81.0 SENILE OSTEOPOROSIS: Primary | ICD-10-CM

## 2023-03-15 DIAGNOSIS — Z92.29 HX DRUG THERAPY: ICD-10-CM

## 2023-03-15 LAB
ANION GAP SERPL CALCULATED.3IONS-SCNC: 10 MMOL/L (ref 7–15)
BUN SERPL-MCNC: 17.3 MG/DL (ref 8–23)
CALCIUM SERPL-MCNC: 9.7 MG/DL (ref 8.8–10.2)
CALCIUM SERPL-MCNC: 9.7 MG/DL (ref 8.8–10.2)
CHLORIDE SERPL-SCNC: 101 MMOL/L (ref 98–107)
CREAT SERPL-MCNC: 0.78 MG/DL (ref 0.51–0.95)
DEPRECATED HCO3 PLAS-SCNC: 29 MMOL/L (ref 22–29)
GFR SERPL CREATININE-BSD FRML MDRD: 80 ML/MIN/1.73M2
GLUCOSE SERPL-MCNC: 93 MG/DL (ref 70–99)
MAGNESIUM SERPL-MCNC: 2.5 MG/DL (ref 1.7–2.3)
PHOSPHATE SERPL-MCNC: 3.1 MG/DL (ref 2.5–4.5)
POTASSIUM SERPL-SCNC: 4 MMOL/L (ref 3.4–5.3)
SODIUM SERPL-SCNC: 140 MMOL/L (ref 136–145)

## 2023-03-15 PROCEDURE — 80048 BASIC METABOLIC PNL TOTAL CA: CPT | Performed by: PATHOLOGY

## 2023-03-15 PROCEDURE — 83735 ASSAY OF MAGNESIUM: CPT | Performed by: PATHOLOGY

## 2023-03-15 PROCEDURE — 84100 ASSAY OF PHOSPHORUS: CPT | Performed by: PATHOLOGY

## 2023-03-15 PROCEDURE — 99214 OFFICE O/P EST MOD 30 MIN: CPT | Mod: 25 | Performed by: FAMILY MEDICINE

## 2023-03-15 PROCEDURE — 96372 THER/PROPH/DIAG INJ SC/IM: CPT | Performed by: FAMILY MEDICINE

## 2023-03-15 PROCEDURE — 36415 COLL VENOUS BLD VENIPUNCTURE: CPT | Performed by: PATHOLOGY

## 2023-03-15 NOTE — NURSING NOTE
Sarah العلي is a 72 year old female that presents in clinic today for the following:     Chief Complaint   Patient presents with     Follow Up     Pt here for 6 month follow up       The patient's allergies and medications were reviewed. The patient's vitals were obtained without incident. The patient does not have any other questions for the provider.     Adela Daly, EMT at 12:39 PM on 3/15/2023.  Primary Care Clinic: [phone]

## 2023-03-15 NOTE — PATIENT INSTRUCTIONS
Get DXA in Sept and then see me after in Sept when next prolia is due   You need blood work now in 2 wks post prolia  You will need a calcium blood test next Sept BEFORE the prolia and then blood work after the prolia  Patient should take 1200mg of calcium/day in divided doses and vitamin D3 1000IU/day.    To schedule your appointment with imaging for your dexascan, please call [phone].

## 2023-03-28 ENCOUNTER — LAB (OUTPATIENT)
Dept: LAB | Facility: CLINIC | Age: 73
End: 2023-03-28
Payer: COMMERCIAL

## 2023-03-28 DIAGNOSIS — M81.0 SENILE OSTEOPOROSIS: ICD-10-CM

## 2023-03-28 LAB
CALCIUM SERPL-MCNC: 9.5 MG/DL (ref 8.8–10.2)
MAGNESIUM SERPL-MCNC: 2.5 MG/DL (ref 1.7–2.3)
PHOSPHATE SERPL-MCNC: 3.3 MG/DL (ref 2.5–4.5)

## 2023-03-28 PROCEDURE — 84100 ASSAY OF PHOSPHORUS: CPT

## 2023-03-28 PROCEDURE — 83735 ASSAY OF MAGNESIUM: CPT

## 2023-03-28 PROCEDURE — 36415 COLL VENOUS BLD VENIPUNCTURE: CPT

## 2023-03-28 PROCEDURE — 82310 ASSAY OF CALCIUM: CPT

## 2023-04-20 ENCOUNTER — TELEPHONE (OUTPATIENT)
Dept: CARDIOLOGY | Facility: CLINIC | Age: 73
End: 2023-04-20
Payer: COMMERCIAL

## 2023-04-20 NOTE — TELEPHONE ENCOUNTER
Patient has been verbally cleared by Dr. Rees for testing. Letter faxed to [facility] at [phone] for patient clearance.     Brigida Cabrales RN on 4/20/2023 at 4:25 PM

## 2023-04-20 NOTE — TELEPHONE ENCOUNTER
[facility] Call Center    Phone Message    May a detailed message be left on voicemail: yes     Reason for Call: Other: Pt states at an appointment today for an upper endoscopy with [facility] they had done an EKG prior to the procedure and determined she was having PVC's and needed further evaluation with her cardiologist before proceeding with the procedure. Pt states she has noticed palpitations recently. There are not any appointments available for a few months and pt is wondering if she could be seen with an RL or worked in to the schedule as soon as possible. Pt declined speaking to a nurse at this time and just wanted to send a message to the team. Pt is also going to fax in EKG results. Please return call to discuss.      Action Taken: Other: Cardiology    Travel Screening: Not Applicable     Thank you!  [facility]

## 2023-05-01 ENCOUNTER — TRANSFERRED RECORDS (OUTPATIENT)
Dept: HEALTH INFORMATION MANAGEMENT | Facility: CLINIC | Age: 73
End: 2023-05-01
Payer: COMMERCIAL

## 2023-05-02 ENCOUNTER — TRANSFERRED RECORDS (OUTPATIENT)
Dept: HEALTH INFORMATION MANAGEMENT | Facility: CLINIC | Age: 73
End: 2023-05-02
Payer: COMMERCIAL

## 2023-05-03 ENCOUNTER — TELEPHONE (OUTPATIENT)
Dept: INTERNAL MEDICINE | Facility: CLINIC | Age: 73
End: 2023-05-03

## 2023-05-03 ENCOUNTER — NURSE TRIAGE (OUTPATIENT)
Dept: NURSING | Facility: CLINIC | Age: 73
End: 2023-05-03

## 2023-05-03 NOTE — TELEPHONE ENCOUNTER
Pt seen at [facility] - transferred call there    Melissa APPIAH RN  [facility]

## 2023-05-03 NOTE — TELEPHONE ENCOUNTER
"  Additional Information    Information only question and nurse able to answer    Answer Assessment - Initial Assessment Questions  1. REASON FOR CALL or QUESTION: \"What is your reason for calling today?\" or \"How can I best help you?\" or \"What question do you have that I can help answer?\"      The patients question is:  Is it reasonable to see a NP today for frequent stooling, possible C-diff while waiting for cultures done on Monday and that results will take 10 days.    Patient has an appt today with NP for this already.    Protocols used: NO PROTOCOL AVAILABLE - INFORMATION ONLY-A-OH    "

## 2023-05-03 NOTE — TELEPHONE ENCOUNTER
Patient is calling and states that she ws given a prescription for Prilosec from [facility] on  04-.  2 days into it, she developed abdominal pain and cramping and stooling 6-8 times a day.  Many calls to [facility], they took stool cultures Monday and it will take 10 days for the results.  She is concerned about her family at risk.  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    She states that she  Made an appt with a NP today at [facility], her PCP was out.  She is wondering if that is reasonable.  This RN agrees that this is reasonable.  This RN does not know what they will do, that will be up to NP to determine.    She thanked me and that is all she wanted.

## 2023-05-10 ENCOUNTER — OFFICE VISIT (OUTPATIENT)
Dept: DERMATOLOGY | Facility: CLINIC | Age: 73
End: 2023-05-10
Payer: COMMERCIAL

## 2023-05-10 DIAGNOSIS — L82.1 SEBORRHEIC KERATOSIS: Primary | ICD-10-CM

## 2023-05-10 DIAGNOSIS — L81.4 LENTIGINES: ICD-10-CM

## 2023-05-10 DIAGNOSIS — D22.9 MULTIPLE BENIGN NEVI: ICD-10-CM

## 2023-05-10 DIAGNOSIS — D18.01 CHERRY ANGIOMA: ICD-10-CM

## 2023-05-10 DIAGNOSIS — D49.2 NEOPLASM OF SKIN: ICD-10-CM

## 2023-05-10 PROCEDURE — 11103 TANGNTL BX SKIN EA SEP/ADDL: CPT | Performed by: DERMATOLOGY

## 2023-05-10 PROCEDURE — 88342 IMHCHEM/IMCYTCHM 1ST ANTB: CPT | Mod: 26 | Performed by: DERMATOLOGY

## 2023-05-10 PROCEDURE — 99203 OFFICE O/P NEW LOW 30 MIN: CPT | Mod: 25 | Performed by: DERMATOLOGY

## 2023-05-10 PROCEDURE — 88342 IMHCHEM/IMCYTCHM 1ST ANTB: CPT | Mod: TC | Performed by: DERMATOLOGY

## 2023-05-10 PROCEDURE — 11102 TANGNTL BX SKIN SINGLE LES: CPT | Performed by: DERMATOLOGY

## 2023-05-10 PROCEDURE — 88305 TISSUE EXAM BY PATHOLOGIST: CPT | Mod: 26 | Performed by: DERMATOLOGY

## 2023-05-10 ASSESSMENT — PAIN SCALES - GENERAL: PAINLEVEL: NO PAIN (0)
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
## 2023-05-10 NOTE — NURSING NOTE
Dermatology Rooming Note    Sarah العلي's goals for this visit include:   Chief Complaint   Patient presents with     Skin Check     Magdiel is here today for a skin check. Lesion of concern on back.      Angela Vázquez RN

## 2023-05-10 NOTE — PATIENT INSTRUCTIONS
Patient Education     Checking for Skin Cancer  You can find cancer early by checking your skin each month. There are 3 kinds of skin cancer. They are melanoma, basal cell carcinoma, and squamous cell carcinoma. Doing monthly skin checks is the best way to find new marks or skin changes. Follow the instructions below for checking your skin.   The ABCDEs of checking moles for melanoma   Check your moles or growths for signs of melanoma using ABCDE:   Asymmetry: the sides of the mole or growth don t match  Border: the edges are ragged, notched, or blurred  Color: the color within the mole or growth varies  Diameter: the mole or growth is larger than 6 mm (size of a pencil eraser)  Evolving: the size, shape, or color of the mole or growth is changing (evolving is not shown in the images below)    Checking for other types of skin cancer  Basal cell carcinoma or squamous cell carcinoma have symptoms such as:     A spot or mole that looks different from all other marks on your skin  Changes in how an area feels, such as itching, tenderness, or pain  Changes in the skin's surface, such as oozing, bleeding, or scaliness  A sore that does not heal  New swelling or redness beyond the border of a mole    Who s at risk?  Anyone can get skin cancer. But you are at greater risk if you have:   Fair skin, light-colored hair, or light-colored eyes  Many moles or abnormal moles on your skin  A history of sunburns from sunlight or tanning beds  A family history of skin cancer  A history of exposure to radiation or chemicals  A weakened immune system  If you have had skin cancer in the past, you are at risk for recurring skin cancer.   How to check your skin  Do your monthly skin checkups in front of a full-length mirror. Check all parts of your body, including your:   Head (ears, face, neck, and scalp)  Torso (front, back, and sides)  Arms (tops, undersides, upper, and lower armpits)  Hands (palms, backs, and fingers, including  under the nails)  Buttocks and genitals  Legs (front, back, and sides)  Feet (tops, soles, toes, including under the nails, and between toes)  If you have a lot of moles, take digital photos of them each month. Make sure to take photos both up close and from a distance. These can help you see if any moles change over time.   Most skin changes are not cancer. But if you see any changes in your skin, call your doctor right away. Only he or she can diagnose a problem. If you have skin cancer, seeing your doctor can be the first step toward getting the treatment that could save your life.   [employer] last reviewed this educational content on 4/1/2019 2000-2020 [employer]. [address] [place] [address], [place] [address]. All rights reserved. This information is not intended as a substitute for professional medical care. Always follow your healthcare professional's instructions.       When should I call my doctor?  If you are worsening or not improving, please, contact us or seek urgent care as noted below.     Who should I call with questions (adults)?  [facility] (adult and pediatric): [phone]  [facility] (adult): [phone]  For urgent needs outside of business hours call the [facility] at [phone] and ask for the dermatology resident on call to be paged  If this is a medical emergency and you are unable to reach an ER, Call [phone]    Who should I call with questions (pediatric)?  [facility]- Pediatric Dermatology  Dr. Roxana Crump, Dr. Rajan Blunt, Dr. Stephanie Smith, ALEKSANDAR Sanders, Dr. France Conklin, Dr. Savana Marquez & Dr. Joselito Pardo  Non-urgent nurse triage line; [phone]- Raine and Carlotta KAT Care Coordinatorchelsi Gordon (/Complex ) [phone]    If you need a prescription refill, please contact your pharmacy. Refills are approved or denied by our  Physicians during normal business hours, Monday through Fridays  Per office policy, refills will not be granted if you have not been seen within the past year (or sooner depending on your child's condition)    Scheduling Information:  Pediatric Appointment Scheduling and Call Center [phone]  Radiology Scheduling- [phone]  Sedation Unit Scheduling- [phone]  [place] Scheduling- General [phone]; Pediatric Dermatology [phone]  Main  Services: [phone]  Italian: [phone]  Japanese: [phone]  Hmong/Grenadian/Croatian: [phone]  Preadmission Nursing Department Fax Number: [phone] (Fax all pre-operative paperwork to this number)    For urgent matters arising during evenings, weekends, or holidays that cannot wait for normal business hours please call [phone] and ask for the dermatology resident on call to be paged.     Wound Care After a Biopsy    What is a skin biopsy?  A skin biopsy allows the doctor to examine a very small piece of tissue under the microscope to determine the diagnosis and the best treatment for the skin condition. A local anesthetic (numbing medicine) is injected with a very small needle into the skin area to be tested. A small piece of skin is taken from the area. Sometimes a suture (stitch) is used.     What are the risks of a skin biopsy?  I will experience scar, bleeding, swelling, pain, crusting and redness. I may experience incomplete removal or recurrence. Risks of this procedure are excessive bleeding, bruising, infection, nerve damage, numbness, thick (hypertrophic or keloidal) scar and non-diagnostic biopsy.    How should I care for my wound for the first 24 hours?  Keep the wound dry and covered for 24 hours  If it bleeds, hold direct pressure on the area for 15 minutes. If bleeding does not stop, call us or go to the emergency room  Avoid strenuous exercise the first 1-2 days or as your doctor instructs you    How should I care for  the wound after 24 hours?  After 24 hours, remove the bandage  You may bathe or shower as normal  If you had a scalp biopsy, you can shampoo as usual and can use shower water to clean the biopsy site daily  Clean the wound once a day with gentle soap and water  Do not scrub, be gentle  Apply white petroleum/Vaseline after cleaning the wound with a cotton swab or a clean finger, and keep the site covered with a Bandaid /bandage. Bandages are not necessary with a scalp biopsy  If you are unable to cover the site with a Bandaid /bandage, re-apply ointment 2-3 times a day to keep the site moist. Moisture will help with healing  Avoid strenuous activity for first 1-2 days  Avoid lakes, rivers, pools, and oceans until the stitches are removed or the site is healed    How do I clean my wound?  Wash hands thoroughly with soap or use hand  before all wound care  Clean the wound with gentle soap and water  Apply white petroleum/Vaseline  to wound after it is clean  Replace the Bandaid /bandage to keep the wound covered for the first few days or as instructed by your doctor  If you had a scalp biopsy, warm shower water to the area on a daily basis should suffice    What should I use to clean my wound?   Cotton-tipped applicators (Qtips )  White petroleum jelly (Vaseline ). Use a clean new container and use Q-tips to apply.  Bandaids  as needed  Gentle soap     How should I care for my wound long term?  Do not get your wound dirty  Keep up with wound care for one week or until the area is healed.  A small scab will form and fall off by itself when the area is completely healed. The area will be red and will become pink in color as it heals. Sun protection is very important for how your scar will turn out. Sunscreen with an SPF 30 or greater is recommended once the area is healed.  You should have some soreness but it should be mild and slowly go away over several days. Talk to your doctor about using tylenol for  pain,    When should I call my doctor?  If you have increased:   Pain or swelling  Pus or drainage (clear or slightly yellow drainage is ok)  Temperature over 100F  Spreading redness or warmth around wound    When will I hear about my results?  The biopsy results can take 2 weeks to come back.  Your results will automatically release to [facility] before your provider has even reviewed them.  The clinic will call you with the results, send you a [facility] message, or have you schedule a follow-up clinic or phone time to discuss the results.  Contact our clinics if you do not hear from us in 2 weeks.    Who should I call with questions?  [facility]: [phone]  [facility]: [phone]  For urgent needs outside of business hours call the [facility] at [phone] and ask for the dermatology resident on call
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
## 2023-05-10 NOTE — NURSING NOTE
Lidocaine-epinephrine 1-1:115255 % injection   1mL once for one use, starting 5/10/2023 ending 5/10/2023,  2mL disp, R-0, injection  Injected by Angela Vázquez RN

## 2023-05-12 ENCOUNTER — TELEPHONE (OUTPATIENT)
Dept: DERMATOLOGY | Facility: CLINIC | Age: 73
End: 2023-05-12
Payer: COMMERCIAL

## 2023-05-12 NOTE — TELEPHONE ENCOUNTER
Cynthia. 1st attempt  informing patient to call [phone] to schedule a follow up with   in Dermatology.

## 2023-05-15 LAB
PATH REPORT.COMMENTS IMP SPEC: NORMAL
PATH REPORT.COMMENTS IMP SPEC: NORMAL
PATH REPORT.FINAL DX SPEC: NORMAL
PATH REPORT.GROSS SPEC: NORMAL
PATH REPORT.MICROSCOPIC SPEC OTHER STN: NORMAL
PATH REPORT.RELEVANT HX SPEC: NORMAL

## 2023-06-05 ENCOUNTER — MYC MEDICAL ADVICE (OUTPATIENT)
Dept: INTERNAL MEDICINE | Facility: CLINIC | Age: 73
End: 2023-06-05
Payer: COMMERCIAL

## 2023-06-13 ENCOUNTER — TELEPHONE (OUTPATIENT)
Dept: INTERNAL MEDICINE | Facility: CLINIC | Age: 73
End: 2023-06-13
Payer: COMMERCIAL

## 2023-06-13 NOTE — TELEPHONE ENCOUNTER
[facility] Call Center    Phone Message    May a detailed message be left on voicemail: yes     Reason for Call: Other: Regarding referral that was placed for Gastroenterology, they have not received, please send again, fax [phone], also please call patient.      Action Taken: Other: PCC    Travel Screening: Not Applicable

## 2023-06-16 ENCOUNTER — OFFICE VISIT (OUTPATIENT)
Dept: INTERNAL MEDICINE | Facility: CLINIC | Age: 73
End: 2023-06-16
Payer: COMMERCIAL

## 2023-06-16 VITALS
HEIGHT: 67 IN | SYSTOLIC BLOOD PRESSURE: 126 MMHG | OXYGEN SATURATION: 100 % | DIASTOLIC BLOOD PRESSURE: 70 MMHG | RESPIRATION RATE: 16 BRPM | WEIGHT: 125.4 LBS | HEART RATE: 61 BPM | TEMPERATURE: 97.3 F | BODY MASS INDEX: 19.68 KG/M2

## 2023-06-16 DIAGNOSIS — N63.25 BREAST LUMP ON LEFT SIDE AT 12 O'CLOCK POSITION: Primary | ICD-10-CM

## 2023-06-16 PROCEDURE — 99213 OFFICE O/P EST LOW 20 MIN: CPT | Performed by: PHYSICIAN ASSISTANT

## 2023-06-16 NOTE — PROGRESS NOTES
"  Assessment & Plan     Breast lump on left side at 12 o'clock position    - MA Diagnostic Digital Bilateral; Future  - US Breast Left Limited 1-3 Quadrants; Future             Schedule as above     Haley Isabel PA-C  [facility] HANSA Veloz is a 72 year old, presenting for the following health issues:  Breast Mass      History of Present Illness       Reason for visit:  Lump in upper L breast tissue  Symptom onset:  1-3 days ago  Symptoms include:  Palpable lump or thickening  Symptom progression:  Staying the same  Had these symptoms before:  No  What makes it worse:  Not applicable  What makes it better:  Not applicable    She eats 4 or more servings of fruits and vegetables daily.She consumes 0 sweetened beverage(s) daily.She exercises with enough effort to increase her heart rate 20 to 29 minutes per day.  She exercises with enough effort to increase her heart rate 3 or less days per week.   She is taking medications regularly.  Noted 2 days ago   Left breast,   Thickening, or lump noted upper part of the breast.   No tenderness.   No skin changes.   No hx of breast lumps or bx in the past.   Family hx-  Paternal aunt with breast cancer.                  Review of Systems         Objective    /70   Pulse 61   Temp 97.3  F (36.3  C) (Tympanic)   Resp 16   Ht 1.689 m (5' 6.5\")   Wt 56.9 kg (125 lb 6.4 oz)   SpO2 100%   BMI 19.94 kg/m    Body mass index is 19.94 kg/m .  Physical Exam   GENERAL: healthy, alert and no distress  BREAST: no palpable axillary masses or adenopathy and mass left breast upper breast between 12-1 oclock thickened area   MS: no gross musculoskeletal defects noted, no edema                    "

## 2023-06-21 ENCOUNTER — HOSPITAL ENCOUNTER (OUTPATIENT)
Dept: MAMMOGRAPHY | Facility: CLINIC | Age: 73
Discharge: HOME | End: 2023-06-21
Attending: PHYSICIAN ASSISTANT
Payer: COMMERCIAL

## 2023-06-21 DIAGNOSIS — N63.25 BREAST LUMP ON LEFT SIDE AT 12 O'CLOCK POSITION: ICD-10-CM

## 2023-06-21 PROCEDURE — 77062 BREAST TOMOSYNTHESIS BI: CPT

## 2023-06-21 PROCEDURE — 76642 ULTRASOUND BREAST LIMITED: CPT | Mod: LT

## 2023-07-02 ASSESSMENT — ENCOUNTER SYMPTOMS
HYPOTENSION: 0
HEMOPTYSIS: 0
POLYDIPSIA: 0
WEIGHT LOSS: 0
EXERCISE INTOLERANCE: 1
POLYPHAGIA: 0
SWOLLEN GLANDS: 0
ALTERED TEMPERATURE REGULATION: 1
ORTHOPNEA: 0
NAIL CHANGES: 0
BLOATING: 0
DYSURIA: 0
SKIN CHANGES: 0
SYNCOPE: 0
HYPOTENSION: 0
DYSPNEA ON EXERTION: 1
WEIGHT GAIN: 0
HOARSE VOICE: 0
TASTE DISTURBANCE: 0
RECTAL PAIN: 0
PALPITATIONS: 1
FEVER: 0
SORE THROAT: 0
COUGH DISTURBING SLEEP: 0
LEG PAIN: 0
BREAST MASS: 1
COUGH DISTURBING SLEEP: 0
FLANK PAIN: 0
SLEEP DISTURBANCES DUE TO BREATHING: 0
SORE THROAT: 0
DYSPNEA ON EXERTION: 1
ORTHOPNEA: 0
HALLUCINATIONS: 0
DYSURIA: 0
VOMITING: 0
COUGH: 0
INCREASED ENERGY: 0
BREAST PAIN: 0
HYPERTENSION: 0
SHORTNESS OF BREATH: 1
ABDOMINAL PAIN: 0
SYNCOPE: 0
SPUTUM PRODUCTION: 0
CONSTIPATION: 0
WEIGHT GAIN: 0
INCREASED ENERGY: 0
POOR WOUND HEALING: 0
LIGHT-HEADEDNESS: 1
WHEEZING: 0
POLYPHAGIA: 0
NIGHT SWEATS: 0
DECREASED APPETITE: 0
HALLUCINATIONS: 0
POSTURAL DYSPNEA: 0
SLEEP DISTURBANCES DUE TO BREATHING: 0
NAUSEA: 0
HOARSE VOICE: 0
SMELL DISTURBANCE: 0
SHORTNESS OF BREATH: 1
HEMATURIA: 0
DIARRHEA: 0
COUGH: 0
PALPITATIONS: 1
VOMITING: 0
DIFFICULTY URINATING: 0
SNORES LOUDLY: 0
SNORES LOUDLY: 0
NIGHT SWEATS: 0
LEG PAIN: 0
DECREASED APPETITE: 0
SINUS PAIN: 0
HEMATURIA: 0
SINUS CONGESTION: 0
NECK MASS: 0
TROUBLE SWALLOWING: 1
TROUBLE SWALLOWING: 1
FATIGUE: 1
RECTAL PAIN: 0
BRUISES/BLEEDS EASILY: 1
SINUS PAIN: 0
POLYDIPSIA: 0
BLOOD IN STOOL: 0
NAIL CHANGES: 0
BRUISES/BLEEDS EASILY: 1
SWOLLEN GLANDS: 0
DIARRHEA: 0
NAUSEA: 0
CHILLS: 0
WEIGHT LOSS: 0
JAUNDICE: 0
CHILLS: 0
EXERCISE INTOLERANCE: 1
HEMOPTYSIS: 0
HEARTBURN: 1
JAUNDICE: 0
POOR WOUND HEALING: 0
SKIN CHANGES: 0
HEARTBURN: 1
TASTE DISTURBANCE: 0
SINUS CONGESTION: 0
POSTURAL DYSPNEA: 0
SPUTUM PRODUCTION: 0
LIGHT-HEADEDNESS: 1
FLANK PAIN: 0
FEVER: 0
FATIGUE: 1
BREAST MASS: 1
CONSTIPATION: 0
BLOATING: 0
SMELL DISTURBANCE: 0
BREAST PAIN: 0
HYPERTENSION: 0
ALTERED TEMPERATURE REGULATION: 1
DIFFICULTY URINATING: 0
BLOOD IN STOOL: 0
NECK MASS: 0
WHEEZING: 0
ABDOMINAL PAIN: 0

## 2023-07-06 DIAGNOSIS — I50.30 HEART FAILURE WITH PRESERVED EJECTION FRACTION, NYHA CLASS I (H): Primary | ICD-10-CM

## 2023-07-10 ENCOUNTER — LAB (OUTPATIENT)
Dept: LAB | Facility: CLINIC | Age: 73
End: 2023-07-10
Attending: INTERNAL MEDICINE
Payer: COMMERCIAL

## 2023-07-10 ENCOUNTER — OFFICE VISIT (OUTPATIENT)
Dept: CARDIOLOGY | Facility: CLINIC | Age: 73
End: 2023-07-10
Attending: INTERNAL MEDICINE
Payer: COMMERCIAL

## 2023-07-10 VITALS
DIASTOLIC BLOOD PRESSURE: 71 MMHG | HEART RATE: 77 BPM | BODY MASS INDEX: 19.83 KG/M2 | SYSTOLIC BLOOD PRESSURE: 130 MMHG | OXYGEN SATURATION: 100 % | WEIGHT: 124.7 LBS

## 2023-07-10 DIAGNOSIS — I50.30 HEART FAILURE WITH PRESERVED EJECTION FRACTION, UNSPECIFIED HF CHRONICITY (H): ICD-10-CM

## 2023-07-10 DIAGNOSIS — I50.30 HEART FAILURE WITH PRESERVED EJECTION FRACTION, NYHA CLASS I (H): ICD-10-CM

## 2023-07-10 DIAGNOSIS — R06.02 SOB (SHORTNESS OF BREATH): ICD-10-CM

## 2023-07-10 LAB
ANION GAP SERPL CALCULATED.3IONS-SCNC: 9 MMOL/L (ref 7–15)
BUN SERPL-MCNC: 16.3 MG/DL (ref 8–23)
CALCIUM SERPL-MCNC: 9.3 MG/DL (ref 8.8–10.2)
CHLORIDE SERPL-SCNC: 99 MMOL/L (ref 98–107)
CREAT SERPL-MCNC: 0.81 MG/DL (ref 0.51–0.95)
DEPRECATED HCO3 PLAS-SCNC: 25 MMOL/L (ref 22–29)
ERYTHROCYTE [DISTWIDTH] IN BLOOD BY AUTOMATED COUNT: 12.3 % (ref 10–15)
GFR SERPL CREATININE-BSD FRML MDRD: 77 ML/MIN/1.73M2
GLUCOSE SERPL-MCNC: 94 MG/DL (ref 70–99)
HCT VFR BLD AUTO: 38.4 % (ref 35–47)
HGB BLD-MCNC: 12.6 G/DL (ref 11.7–15.7)
MCH RBC QN AUTO: 30.3 PG (ref 26.5–33)
MCHC RBC AUTO-ENTMCNC: 32.8 G/DL (ref 31.5–36.5)
MCV RBC AUTO: 92 FL (ref 78–100)
NT-PROBNP SERPL-MCNC: 389 PG/ML (ref 0–900)
PLATELET # BLD AUTO: 252 10E3/UL (ref 150–450)
POTASSIUM SERPL-SCNC: 4 MMOL/L (ref 3.4–5.3)
RBC # BLD AUTO: 4.16 10E6/UL (ref 3.8–5.2)
SODIUM SERPL-SCNC: 133 MMOL/L (ref 136–145)
WBC # BLD AUTO: 3.8 10E3/UL (ref 4–11)

## 2023-07-10 PROCEDURE — 36415 COLL VENOUS BLD VENIPUNCTURE: CPT | Performed by: PATHOLOGY

## 2023-07-10 PROCEDURE — 85027 COMPLETE CBC AUTOMATED: CPT | Performed by: PATHOLOGY

## 2023-07-10 PROCEDURE — 83880 ASSAY OF NATRIURETIC PEPTIDE: CPT | Performed by: PATHOLOGY

## 2023-07-10 PROCEDURE — G0463 HOSPITAL OUTPT CLINIC VISIT: HCPCS | Performed by: INTERNAL MEDICINE

## 2023-07-10 PROCEDURE — 80048 BASIC METABOLIC PNL TOTAL CA: CPT | Performed by: PATHOLOGY

## 2023-07-10 PROCEDURE — 99214 OFFICE O/P EST MOD 30 MIN: CPT | Performed by: INTERNAL MEDICINE

## 2023-07-10 ASSESSMENT — PAIN SCALES - GENERAL: PAINLEVEL: NO PAIN (0)

## 2023-07-10 NOTE — PATIENT INSTRUCTIONS
"You were seen today in the Cardiovascular Clinic at the [facility].       Cardiology Providers you saw during your visit: Dr. Rees      Medication Changes:   1. No changes      Follow up Appointment Information:  1. Schedule PYP scan  2. Follow up with Dr Rees in 1 year with labs and echo prior            [address] [facility] on [address]   [place] [id number]        Thank you for allowing us to be a part of your care here at the [facility]      If you have questions or concerns please contact us at:      Cyndee Fischer RN BSN   Cardiology Care Coordinator  [facility]   Questions and [phone]   press #1 to \"send a message to your care team\"       "

## 2023-07-10 NOTE — NURSING NOTE
Chief Complaint   Patient presents with     Follow Up     July 10, 2023: reason for visit: RTN HF: 72 year old female presents with exercise induced HFpEF for follow-up with labs prior     Vitals were taken and medications reconciled.    Tex Garcia, EMT  8:38 AM

## 2023-07-10 NOTE — NURSING NOTE
Diet: Patient instructed regarding a heart failure healthy diet, including discussion of reduced fat and 2000 mg daily sodium restriction, daily weights, medication purpose and compliance, fluid restrictions and resources for patient and family to access for assistance with heart failure management.       Labs: Patient was given results of the laboratory testing obtained today and patient was instructed about when to return for the next laboratory testing.     Med Reconcile: Reviewed and verified all current medications with the patient. The updated medication list was printed and given to the patient. No changes.     Return Appointment: Patient given instructions regarding scheduling next clinic visit. PYP scan. Follow up with Dr Paul in 1 year with labs and echo prior.    Patient stated she understood all health information given and agreed to call with further questions or concerns.     Cyndee Fischer RN

## 2023-07-10 NOTE — PROGRESS NOTES
"Dear Dr. Queen,    We had the pleasure of seeing Ms. Sarah العلي for follow-up in her pulmonary hypertension clinic today.  As you know she is a 72-year-old female with Raynaud's disease, family history of scleroderma, HFpEF, and PAC's who returns today for follow-up.    She is doing overall well.  She and her  are walking 30 minutes a day.  She has not noticed any decrease in her exercise capacity.  No exertional chest pain or chest pressure.  No exertional presyncope or syncope.  No lower extremity swelling or abdominal distention.  No recent hospitalizations or ER visits.      Her main symptom is palpitations.  She is noticing them more frequently now both during the day and at nighttime.  She does not have any associated lightheadedness dizziness or syncope with it.  She saw electrophysiology at the [facility] for second opinion.  She had a remote monitor that showed 17% PACs but no sustained SVT, PVCs or VT.  They also recommended monitoring.  She is currently wearing a digital monitor to identify if she were to develop atrial fibrillation.      We started her on empagliflozin after her last clinic visit.  She developed significant dizziness and lightheadedness, thus we have discontinued it.    PMH:  Past Medical History:   Diagnosis Date     Age-related osteoporosis without current pathological fracture 02/22/2019     Benign positional vertigo decades     Bilateral hearing loss, unspecified hearing loss type 12/06/2018     CARDIOVASCULAR SCREENING; LDL GOAL LESS THAN 160 07/17/2013     Cerebral aneurysm 12/2017     Erythema multiforme 09/26/2016     GERD (gastroesophageal reflux disease)      Hearing problem '04 & \"18    SNHL mild L ear;moderate-severe R ear  >75% loss word compre     Hematuria 09/26/2016    Overview:  Has had urologic evaluation     Laryngospasm 09/26/2016     Lichen planus      Migraine     with auora     Migraines 2000    Ocular migraine/ Migraine with Aura     Ocular migraine " "09/26/2016     Oral lichen planus 09/26/2016     Osteopenia 2010     Osteopenia of left hip 12/12/2018     Physical exam 09/26/2016    Overview:  Full code.  Would want her  and son to make medical decisions for her if she were unable to do so.  Does not have an advanced directive.     Overview:  Diet - tries to eat a healthy diet  Exercise - \"I'm a fair weather walker\"  Active during the day Mammogram - 10/2017  Colonoscopy - 2/2008 - next in 2018  DXA - 8/2015 - next in 2018  Immunizations -  Up to date      Pulmonary hypertension (H)      Right internal carotid artery aneurysm 12/06/2018    5 mm     Sensorineural hearing loss 2/04 & 4/18 2/18 worsened     Tinnitus 12/2018    R ear only     White coat syndrome without diagnosis of hypertension 12/06/2018     Current Medications    Current Outpatient Medications   Medication Sig     albuterol (PROAIR HFA/PROVENTIL HFA/VENTOLIN HFA) 108 (90 Base) MCG/ACT inhaler Inhale 2 puffs into the lungs every 6 hours as needed for shortness of breath, wheezing or cough     Cholecalciferol (VITAMIN D3) 1000 UNITS CAPS Take 1 capsule by mouth daily     fluocinonide (LIDEX) 0.05 % external gel Apply topically 2 times daily     Psyllium (METAMUCIL FREE & NATURAL) 43 % POWD  (Patient not taking: Reported on 7/10/2023)     No current facility-administered medications for this visit.     Exam:  /71 (BP Location: Left arm, Patient Position: Chair, Cuff Size: Adult Regular)   Pulse 77   Wt 56.6 kg (124 lb 11.2 oz)   SpO2 100%   BMI 19.83 kg/m    She was awake, alert, oriented x3.  She was comfortable.  She was in no apparent distress.  She had no pallor, cyanosis or jaundice.  Her neck exam revealed no jugular venous distention.  She had no lower extremity edema.  Cardiac auscultation revealed normal S1 and S2 with no murmur rub or gallop.  Auscultation of the lungs revealed equal air entry on both sides with no added sound.  Her abdomen was soft with normal also no " tenderness no rigidity no guarding.  She had no focal neurological deficit.  Her extremities showed no edema.    Recent Results (from the past 168 hour(s))   Basic metabolic panel    Collection Time: 07/10/23  7:52 AM   Result Value Ref Range    Sodium 133 (L) 136 - 145 mmol/L    Potassium 4.0 3.4 - 5.3 mmol/L    Chloride 99 98 - 107 mmol/L    Carbon Dioxide (CO2) 25 22 - 29 mmol/L    Anion Gap 9 7 - 15 mmol/L    Urea Nitrogen 16.3 8.0 - 23.0 mg/dL    Creatinine 0.81 0.51 - 0.95 mg/dL    Calcium 9.3 8.8 - 10.2 mg/dL    Glucose 94 70 - 99 mg/dL    GFR Estimate 77 >60 mL/min/1.73m2   CBC with platelets    Collection Time: 07/10/23  7:52 AM   Result Value Ref Range    WBC Count 3.8 (L) 4.0 - 11.0 10e3/uL    RBC Count 4.16 3.80 - 5.20 10e6/uL    Hemoglobin 12.6 11.7 - 15.7 g/dL    Hematocrit 38.4 35.0 - 47.0 %    MCV 92 78 - 100 fL    MCH 30.3 26.5 - 33.0 pg    MCHC 32.8 31.5 - 36.5 g/dL    RDW 12.3 10.0 - 15.0 %    Platelet Count 252 150 - 450 10e3/uL   N terminal pro BNP outpatient    Collection Time: 07/10/23  7:52 AM   Result Value Ref Range    N Terminal Pro BNP Outpatient 389 0 - 900 pg/mL     PFT (2021)  Her pulmonary function test showed an FVC of 97% FEV1 of 96%, FEV1 by FVC of 76%, TLC of 101% and a DLCO of 96%.    CT pulmonary angiogram (2021)  Her CT dual-energy pulmonary angiogram showed no evidence of chronic thromboembolic disease.  She had no parenchymal abnormalities.  She had multiple solitary nodules.    CPEX (2021)  She had a cardiopulmonary exercise testing.  She exercised for 7 minutes and 32 seconds.  She did not achieve anaerobic threshold.  Her RER was 0.94.  Her VO2 was 19 mL/kg/min with was 82% age of 18 gender predicted.  Her VE VCO2 slope was normal at 25.7.  She had appropriate blood pressure and heart rate response.  She did not encroach upon her breathing reserve.  Her breathing reserve at peak exercise was normal at 61.    Her NT proBNP was normal.  Her CBC chemistry and liver function  tests were unremarkable.    She had exercise right heart catheterization with the following hemodynamics.    Baseline:  RA: 7/10/7  RV: 35/8  PCWP: 13/20/13  PA: 35/15/24  PA Sat: 77.2%  Hb: 12.4  HR: 72  Ao Sat: 100%  Basilio CO/CI: 4.9/2.9  TD CO/CI: 4.1/2.5  VO2: 187  PVR 2.2      Exercise (4 min 50 seconds):  RA: 8/14/8  PA: 54/35/42  PCWP: 35/51/35  PA Sat: 39.3%  Hb: 12.3  HR: 138  Ao Sat: 97%  Basilio CO/CI: 8.5/5.1  VO2: 821  PVR 0.8 CARLOS    Echo ([place] 03/2023)    LEFT VENTRICLE:Normal left ventricular chamber size. Normal left ventricular geometry. Calculated 2-D linear left ventricular ejection fraction 64%. Global averaged left ventricular longitudinal peak systolic strain is normal at -20% (normal = more   negative than -18%). Left ventricular cardiac index 2.51 l/min/m2. No regional wall motion abnormalities. Normal left ventricular filling pressure at rest. Left ventricular mass index by 2D 54 g/m2.   RIGHT VENTRICLE:Normal right ventricular chamber size. Normal right ventricular systolic function. Estimated right ventricular systolic pressure 31 mmHg (right atrial pressure of 5 mmHg).   ATRIA:Normal left atrial size. Left atrial volume index 34 ml/m2. Normal right atrial size.   CARDIAC VALVES:Trileaflet aortic valve. Mildly sclerotic aortic valve. Mild-moderate aortic valve regurgitation , central and commissural (non-left) jets Aortic regurgitation ERO (PISA) 0.11 cm2. Aortic regurgitant volume (PISA) 27 ml. Mildly thickened   mitral valve. Mild mitral valve regurgitation. Normal pulmonary valve. Normal pulmonary valve systolic velocities. Trivial pulmonary valve regurgitation. Normal tricuspid valve. Mild-moderate tricuspid valve regurgitation. Eccentric tricuspid valve   regurgitant jet.   OTHER ECHO FINDINGS:Normal inferior vena cava size with normal inspiratory collapse (>50%). Normal sinus of Valsalva diameter of 33 mm. Upper limit of normal of the sinus of Valsalva for age, sex and BSA is 38 mm.  Normal mid ascending aorta diameter of   32 mm. Upper limit of normal of the mid ascending aorta, for age, sex and BSA is 38 mm. No abdominal aortic aneurysm. Normal abdominal aorta Doppler flow pattern. No atrial level shunt by color flow imaging. No intracardiac mass or thrombus, but the left   atrial appendage cannot be visualized adequately with transthoracic echo to exclude thrombus in this location. Tiny anterior pericardial effusion.    Assessment and Plan:  In summary Ms. العلي is a very pleasant 72-year-old female with Raynaud's disease, a family history of scleroderma, and  Exercise  induced heart failure with preserved ejection fraction who returns today for follow-up. y.    Exercise-induced heart failure with preserved ejection fraction     She is stable.  She is functional class II.  She has no evidence of decompensated heart failure.We discussed the importance of low-salt diet and fluid restriction.  We also discussed the importance of regular exercise. She didn't tolerate SGLT2-Inhibitors.    Supraventricular arrhythmia -predominantly PACs    We discussed the potential option of starting beta-blockers however given the concern of fatigue she would like to hold off which I think is very reasonable.  Her SVT burden is very low.  She has symptoms mainly at nighttime.  We will continue to monitor this closely.  She is also wearing a digital watch to monitor for atrial fibrillation.    Pulmonary nodules  She follows up with the pulmonary nodule clinic at the [facility].    I have recommended her to return to clinic in a year with a repeat echocardiogram and labs.  She will call us in the interim with any further worsening symptoms. It was a pleasure seeing Ms. العلي in our bone hypertension clinic at [facility].    Total time today was 35 minutes reviewing notes, imaging, labs, patient visit, orders and documentation         Sincerely,  Negrita Rees MD   Center for Pulmonary  Hypertension  Heart Failure, Transplant, and Mechanical Circulatory Support Cardiology   Cardiovascular Division  [facility]   [phone]

## 2023-07-10 NOTE — LETTER
7/10/2023      RE: Sarah العلي  [address]  [place] [address]       Dear Colleague,    Thank you for the opportunity to participate in the care of your patient, Sarah العلي, at the [facility] [address] at [facility]. Please see a copy of my visit note below.    Dear Dr. Queen,    We had the pleasure of seeing Ms. Sarah العلي for follow-up in her pulmonary hypertension clinic today.  As you know she is a 72-year-old female with Raynaud's disease, family history of scleroderma, HFpEF, and PAC's who returns today for follow-up.    She is doing overall well.  She and her  are walking 30 minutes a day.  She has not noticed any decrease in her exercise capacity.  No exertional chest pain or chest pressure.  No exertional presyncope or syncope.  No lower extremity swelling or abdominal distention.  No recent hospitalizations or ER visits.      Her main symptom is palpitations.  She is noticing them more frequently now both during the day and at nighttime.  She does not have any associated lightheadedness dizziness or syncope with it.  She saw electrophysiology at the [facility] for second opinion.  She had a remote monitor that showed 17% PACs but no sustained SVT, PVCs or VT.  They also recommended monitoring.  She is currently wearing a digital monitor to identify if she were to develop atrial fibrillation.      We started her on empagliflozin after her last clinic visit.  She developed significant dizziness and lightheadedness, thus we have discontinued it.    PMH:  Past Medical History:   Diagnosis Date    Age-related osteoporosis without current pathological fracture 02/22/2019    Benign positional vertigo decades    Bilateral hearing loss, unspecified hearing loss type 12/06/2018    CARDIOVASCULAR SCREENING; LDL GOAL LESS THAN 160 07/17/2013    Cerebral aneurysm 12/2017    Erythema multiforme 09/26/2016    GERD  "(gastroesophageal reflux disease)     Hearing problem '04 & \"18    SNHL mild L ear;moderate-severe R ear  >75% loss word compre    Hematuria 09/26/2016    Overview:  Has had urologic evaluation    Laryngospasm 09/26/2016    Lichen planus     Migraine     with auora    Migraines 2000    Ocular migraine/ Migraine with Aura    Ocular migraine 09/26/2016    Oral lichen planus 09/26/2016    Osteopenia 2010    Osteopenia of left hip 12/12/2018    Physical exam 09/26/2016    Overview:  Full code.  Would want her  and son to make medical decisions for her if she were unable to do so.  Does not have an advanced directive.     Overview:  Diet - tries to eat a healthy diet  Exercise - \"I'm a fair weather walker\"  Active during the day Mammogram - 10/2017  Colonoscopy - 2/2008 - next in 2018  DXA - 8/2015 - next in 2018  Immunizations -  Up to date     Pulmonary hypertension (H)     Right internal carotid artery aneurysm 12/06/2018    5 mm    Sensorineural hearing loss 2/04 & 4/18 2/18 worsened    Tinnitus 12/2018    R ear only    White coat syndrome without diagnosis of hypertension 12/06/2018     Current Medications    Current Outpatient Medications   Medication Sig    albuterol (PROAIR HFA/PROVENTIL HFA/VENTOLIN HFA) 108 (90 Base) MCG/ACT inhaler Inhale 2 puffs into the lungs every 6 hours as needed for shortness of breath, wheezing or cough    Cholecalciferol (VITAMIN D3) 1000 UNITS CAPS Take 1 capsule by mouth daily    fluocinonide (LIDEX) 0.05 % external gel Apply topically 2 times daily    Psyllium (METAMUCIL FREE & NATURAL) 43 % POWD  (Patient not taking: Reported on 7/10/2023)     No current facility-administered medications for this visit.     Exam:  /71 (BP Location: Left arm, Patient Position: Chair, Cuff Size: Adult Regular)   Pulse 77   Wt 56.6 kg (124 lb 11.2 oz)   SpO2 100%   BMI 19.83 kg/m    She was awake, alert, oriented x3.  She was comfortable.  She was in no apparent distress.  She had " no pallor, cyanosis or jaundice.  Her neck exam revealed no jugular venous distention.  She had no lower extremity edema.  Cardiac auscultation revealed normal S1 and S2 with no murmur rub or gallop.  Auscultation of the lungs revealed equal air entry on both sides with no added sound.  Her abdomen was soft with normal also no tenderness no rigidity no guarding.  She had no focal neurological deficit.  Her extremities showed no edema.    Recent Results (from the past 168 hour(s))   Basic metabolic panel    Collection Time: 07/10/23  7:52 AM   Result Value Ref Range    Sodium 133 (L) 136 - 145 mmol/L    Potassium 4.0 3.4 - 5.3 mmol/L    Chloride 99 98 - 107 mmol/L    Carbon Dioxide (CO2) 25 22 - 29 mmol/L    Anion Gap 9 7 - 15 mmol/L    Urea Nitrogen 16.3 8.0 - 23.0 mg/dL    Creatinine 0.81 0.51 - 0.95 mg/dL    Calcium 9.3 8.8 - 10.2 mg/dL    Glucose 94 70 - 99 mg/dL    GFR Estimate 77 >60 mL/min/1.73m2   CBC with platelets    Collection Time: 07/10/23  7:52 AM   Result Value Ref Range    WBC Count 3.8 (L) 4.0 - 11.0 10e3/uL    RBC Count 4.16 3.80 - 5.20 10e6/uL    Hemoglobin 12.6 11.7 - 15.7 g/dL    Hematocrit 38.4 35.0 - 47.0 %    MCV 92 78 - 100 fL    MCH 30.3 26.5 - 33.0 pg    MCHC 32.8 31.5 - 36.5 g/dL    RDW 12.3 10.0 - 15.0 %    Platelet Count 252 150 - 450 10e3/uL   N terminal pro BNP outpatient    Collection Time: 07/10/23  7:52 AM   Result Value Ref Range    N Terminal Pro BNP Outpatient 389 0 - 900 pg/mL     PFT (2021)  Her pulmonary function test showed an FVC of 97% FEV1 of 96%, FEV1 by FVC of 76%, TLC of 101% and a DLCO of 96%.    CT pulmonary angiogram (2021)  Her CT dual-energy pulmonary angiogram showed no evidence of chronic thromboembolic disease.  She had no parenchymal abnormalities.  She had multiple solitary nodules.    CPEX (2021)  She had a cardiopulmonary exercise testing.  She exercised for 7 minutes and 32 seconds.  She did not achieve anaerobic threshold.  Her RER was 0.94.  Her VO2 was  19 mL/kg/min with was 82% age of 18 gender predicted.  Her VE VCO2 slope was normal at 25.7.  She had appropriate blood pressure and heart rate response.  She did not encroach upon her breathing reserve.  Her breathing reserve at peak exercise was normal at 61.    Her NT proBNP was normal.  Her CBC chemistry and liver function tests were unremarkable.    She had exercise right heart catheterization with the following hemodynamics.    Baseline:  RA: 7/10/7  RV: 35/8  PCWP: 13/20/13  PA: 35/15/24  PA Sat: 77.2%  Hb: 12.4  HR: 72  Ao Sat: 100%  Basilio CO/CI: 4.9/2.9  TD CO/CI: 4.1/2.5  VO2: 187  PVR 2.2      Exercise (4 min 50 seconds):  RA: 8/14/8  PA: 54/35/42  PCWP: 35/51/35  PA Sat: 39.3%  Hb: 12.3  HR: 138  Ao Sat: 97%  Basilio CO/CI: 8.5/5.1  VO2: 821  PVR 0.8 CARLOS    Echo ([place] 03/2023)    LEFT VENTRICLE:Normal left ventricular chamber size. Normal left ventricular geometry. Calculated 2-D linear left ventricular ejection fraction 64%. Global averaged left ventricular longitudinal peak systolic strain is normal at -20% (normal = more   negative than -18%). Left ventricular cardiac index 2.51 l/min/m2. No regional wall motion abnormalities. Normal left ventricular filling pressure at rest. Left ventricular mass index by 2D 54 g/m2.   RIGHT VENTRICLE:Normal right ventricular chamber size. Normal right ventricular systolic function. Estimated right ventricular systolic pressure 31 mmHg (right atrial pressure of 5 mmHg).   ATRIA:Normal left atrial size. Left atrial volume index 34 ml/m2. Normal right atrial size.   CARDIAC VALVES:Trileaflet aortic valve. Mildly sclerotic aortic valve. Mild-moderate aortic valve regurgitation , central and commissural (non-left) jets Aortic regurgitation ERO (PISA) 0.11 cm2. Aortic regurgitant volume (PISA) 27 ml. Mildly thickened   mitral valve. Mild mitral valve regurgitation. Normal pulmonary valve. Normal pulmonary valve systolic velocities. Trivial pulmonary valve regurgitation. Normal  tricuspid valve. Mild-moderate tricuspid valve regurgitation. Eccentric tricuspid valve   regurgitant jet.   OTHER ECHO FINDINGS:Normal inferior vena cava size with normal inspiratory collapse (>50%). Normal sinus of Valsalva diameter of 33 mm. Upper limit of normal of the sinus of Valsalva for age, sex and BSA is 38 mm. Normal mid ascending aorta diameter of   32 mm. Upper limit of normal of the mid ascending aorta, for age, sex and BSA is 38 mm. No abdominal aortic aneurysm. Normal abdominal aorta Doppler flow pattern. No atrial level shunt by color flow imaging. No intracardiac mass or thrombus, but the left   atrial appendage cannot be visualized adequately with transthoracic echo to exclude thrombus in this location. Tiny anterior pericardial effusion.    Assessment and Plan:  In summary Ms. العلي is a very pleasant 72-year-old female with Raynaud's disease, a family history of scleroderma, and  Exercise  induced heart failure with preserved ejection fraction who returns today for follow-up. y.    Exercise-induced heart failure with preserved ejection fraction     She is stable.  She is functional class II.  She has no evidence of decompensated heart failure.We discussed the importance of low-salt diet and fluid restriction.  We also discussed the importance of regular exercise. She didn't tolerate SGLT2-Inhibitors.    Supraventricular arrhythmia -predominantly PACs    We discussed the potential option of starting beta-blockers however given the concern of fatigue she would like to hold off which I think is very reasonable.  Her SVT burden is very low.  She has symptoms mainly at nighttime.  We will continue to monitor this closely.  She is also wearing a digital watch to monitor for atrial fibrillation.    Pulmonary nodules  She follows up with the pulmonary nodule clinic at the [facility].    I have recommended her to return to clinic in a year with a repeat echocardiogram and labs.  She will call us in the  interim with any further worsening symptoms. It was a pleasure seeing Ms. العلي in our bone hypertension clinic at [facility].    Total time today was 35 minutes reviewing notes, imaging, labs, patient visit, orders and documentation         Sincerely,  Negrita Rees MD   Center for Pulmonary Hypertension  Heart Failure, Transplant, and Mechanical Circulatory Support Cardiology   Cardiovascular Division  [facility]   [phone]

## 2023-07-12 ENCOUNTER — OFFICE VISIT (OUTPATIENT)
Dept: INTERNAL MEDICINE | Facility: CLINIC | Age: 73
End: 2023-07-12
Payer: COMMERCIAL

## 2023-07-12 VITALS
BODY MASS INDEX: 20.18 KG/M2 | WEIGHT: 125.6 LBS | OXYGEN SATURATION: 100 % | SYSTOLIC BLOOD PRESSURE: 136 MMHG | HEART RATE: 69 BPM | HEIGHT: 66 IN | DIASTOLIC BLOOD PRESSURE: 79 MMHG

## 2023-07-12 DIAGNOSIS — R13.19 INTERMITTENT DYSPHAGIA: ICD-10-CM

## 2023-07-12 DIAGNOSIS — R10.13 MIDEPIGASTRIC PAIN: Primary | ICD-10-CM

## 2023-07-12 PROCEDURE — 99397 PER PM REEVAL EST PAT 65+ YR: CPT | Performed by: INTERNAL MEDICINE

## 2023-07-12 NOTE — NURSING NOTE
"Sarah العلي is a 72 year old female patient that presents today in clinic for the following:    Chief Complaint   Patient presents with     Physical     Pt here for physical and would like to discuss purpura, gastroenterology, and lipids.     The patient's allergies and medications were reviewed as noted. A set of vitals were recorded as noted without incident: /79 (BP Location: Left arm, Patient Position: Sitting, Cuff Size: Adult Small)   Pulse 69   Ht 1.689 m (5' 6.5\")   Wt 57 kg (125 lb 9.6 oz)   SpO2 100%   BMI 19.97 kg/m  . The patient does not have any other questions for the provider.    Angelita Ahmadi, EMT at 11:04 AM on 7/12/2023  "

## 2023-07-12 NOTE — PROGRESS NOTES
Medicare Annual Wellness Visit Note    Sarah العلي is a 72 year old year old female patient that presents for a Medicare Well[facility]    1. The following are provider that share care for Sarah العلي:  Patient Care Team       Relationship Specialty Notifications Start End    Kelley Alves MD PCP - General Internal Medicine  6/22/22     Phone: [phone] Pager: [phone] Fax: [phone]        [address] [facility] [address] [facility] [address]    Joselito Rebolledo MD MD Family Medicine - Sports Medicine  4/16/15     Phone: [phone] Fax: [phone]         [facility] [address] [facility] [address]    Magdalena Davis MD MD Internal Medicine  1/8/19     Phone: [phone] Fax: [phone]         [address] [facility] [address] [facility] [address]    Chetna Arciniega PA-C Physician Assistant Physician Assistant  5/15/19     Phone: [phone] Fax: [phone]         [address] [facility] [address] [facility] [address]    Jorden Garces Chi, OD MD Optometry  11/1/19     Phone: [phone] Fax: [phone]         [address] [facility] [address]    Desi Calix MD MD Otolaryngology  1/8/20     Phone: [phone] Fax: [phone]         420 [facility] [address] [facility] [address]    Maye Sands MD MD Urology  4/8/21     Phone: [phone] Fax: [phone]         420 [facility] [address] [facility] [address]    Jazmin Green, RN Specialty Care Coordinator Urology  4/8/21     Phone: [phone] Pager: [phone]        Negrita Rees MD MD Cardiovascular Disease  10/26/21     Phone: [phone] Fax: [phone]         [address] [facility] [address] [facility] [address]    Cyndee Fischer, RN Specialty Care Coordinator Cardiology Admissions 11/16/21     Phone: [phone]         Negrita Rees MD Assigned Heart and Vascular Provider   11/28/21     Phone: [phone] Fax: [phone]          420 [facility] 508 [facility] [address]    Savana Gordon RN Specialty Care Coordinator Cardiology  4/22/22     Phone: [phone] Fax: [phone]         [address] [place] [address]    Edmundo Ro MD MD Cardiovascular Disease  4/22/22     Phone: [phone] Fax: [phone]         [address] [place] [address]    Kelley Alves MD Assigned PCP   6/25/22     Phone: [phone] Pager: [phone] Fax: [phone]        [address] [place] [address] [address] [place] [address]    Israel Winkler MD MD Otolaryngology  9/22/22     Phone: [phone] Fax: [phone]         [address] [place] [address]    Israel Winkler MD Assigned Surgical Provider   10/8/22     Phone: [phone] Fax: [phone]         [address] [place] [address]    Sintia De Anda MD MD Dermatology  5/12/23     Phone: [phone] Pager: [phone] Fax: [phone]         DERMATOLOGY [address] [place], [place] [address] [address]        2. What is your marital status:  3. Who lives in your household? , daughter  4. Does your home have loose rugs in the hallway? No  5. Does your home have grab bars in the bathroom? No  6. Does your home have handrails on the stairs? Yes   7. Does your home have poorly lit areas? No  8. How many times have you fallen in the past year? N/A  9. How many times have you been in the Emergency Room in the last year? N/A   10. How many times have you been hospitalized in the last year? N/A  11. Do you feel threatened or controlled by a partner, ex-partner or anyone in your life? No  12. Has anyone hurt you physically, for example by pushing, hitting, slapping or kicking you or forcing you to have sex? No  13. Are you sexually active? No   a. If yes, with men, women, or both?   b. If yes, do you have more than one current partner? N/A  c. If yes, are you using condoms? N/A   14. Have you had any sexually transmitted infections in the last year?  No  15. Do you have any sexual concerns? No   16. For Women Only:  a. What year did you stop having periods (approximate age)? 50  b. Any vaginal bleeding in the last year? No  c. Have you ever had an abnormal Pap smear? No  17. Do you need help with the phone, transportation, shopping, preparing meals, housework, laundry, medications or managing money? No  18. Have you noticed any hearing difficulties? Yes   19. Do you wear hearing aids? Yes   20. Have you seen a hearing professional such as an audiologist in the past year? No  21. Do you have vision difficulties? Yes   22. Do you wear glasses or contacts? Yes   23. Have you seen an eye doctor in the past year? No; 18 months?  24. How many servings of fruits and vegetables do you eat a day (1 serving is 1 cup)? 4  25. How often do you exercise in a week? 3  26. What kind of exercise do you do and how long? walking  27. Do you wear a seatbelt when driving or riding in a vehicle? N/A  28. Do you use tobacco?  No  29. Do you use e-cigarettes?  No  30. Do you use any other drugs? No     31. Do you drink alcohol? Yes  a. If you drink alcohol, how many drinks per week? 1 glass/month  32. Over the past 2 weeks, how often have you been bothered by any of the following problems?  a. Little interest or pleasure in doing things: not at all  b. Feeling down, depressed, or hopeless: not at all  c. PHQ-2 Total: 0  33. Have you completed an Advance Directives document? Yes  34. If yes, have you given a copy to the clinic? Yes   35. Would you like information on Advance Directives today? N/A    WILLY Valle  11:38 AM 7/12/2023  Answers for HPI/ROS submitted by the patient on 7/2/2023  General Symptoms: Yes  Skin Symptoms: Yes  HENT Symptoms: Yes  EYE SYMPTOMS: No  HEART SYMPTOMS: Yes  LUNG SYMPTOMS: Yes  INTESTINAL SYMPTOMS: Yes  URINARY SYMPTOMS: Yes  GYNECOLOGIC SYMPTOMS: No  BREAST SYMPTOMS: Yes  SKELETAL SYMPTOMS: No  BLOOD SYMPTOMS: Yes  NERVOUS SYSTEM SYMPTOMS:  No  MENTAL HEALTH SYMPTOMS: No  Ear pain: No  Ear discharge: No  Hearing loss: Yes  Tinnitus: Yes  Nosebleeds: No  Congestion: No  Sinus pain: No  Trouble swallowing: Yes   Voice hoarseness: No  Mouth sores: No  Sore throat: No  Tooth pain: No  Gum tenderness: No  Change in taste: No  Change in sense of smell: No  Dry mouth: No  Hearing aid used: Yes  Neck lump: No  Fever: No  Loss of appetite: No  Weight loss: No  Weight gain: No  Fatigue: Yes  Night sweats: No  Chills: No  Increased stress: No  Excessive hunger: No  Excessive thirst: No  Feeling hot or cold when others believe the temperature is normal: Yes  Loss of height: Yes  Post-operative complications: No  Surgical site pain: No  Hallucinations: No  Change in or Loss of Energy: No  Hyperactivity: No  Confusion: No  Changes in hair: No  Changes in moles/birth marks: No  Itching: No  Changes in nails: No  Acne: No  Hair in places you don't want it: No  Change in facial hair: No  Warts: No  Non-healing sores: No  Scarring: No  Flaking of skin: No  Color changes of hands/feet in cold : Yes  Sun sensitivity: No  Skin thickening: No  Chest pain or pressure: Yes  Fast or irregular heartbeat: Yes  Pain in legs with walking: No  Trouble breathing while lying down: No  Fingers or toes appear blue: Yes  High blood pressure: No  Low blood pressure: No  Fainting: No  Murmurs: No  Pacemaker: No  Varicose veins: No  Wake up at night with shortness of breath: No  Light-headedness: Yes  Exercise intolerance: Yes  Cough: No  Sputum or phlegm: No  Coughing up blood: No  Difficulty breating or shortness of breath: Yes  Snoring: No  Wheezing: No  Difficulty breathing on exertion: Yes  Nighttime Cough: No  Difficulty breathing when lying flat: No  Heart burn or indigestion: Yes  Nausea: No  Vomiting: No  Abdominal pain: No  Bloating: No  Constipation: No  Diarrhea: No  Blood in stool: No  Black stools: No  Rectal or Anal pain: No  Yellowing of skin or eyes: No  Vomit with blood:  No  Change in stools: No  Trouble holding urine or incontinence: No  Pain or burning: No  Trouble starting or stopping: No  Increased frequency of urination: Yes  Blood in urine: No  Decreased frequency of urination: No  Frequent nighttime urination: Yes  Flank pain: No  Difficulty emptying bladder: No  Discharge: No  Lumps: Yes  Pain: No  Nipple retraction: No  Edema or swelling: No  Anemia: No  Swollen glands: No  Easy bleeding or bruising: Yes

## 2023-07-12 NOTE — PROGRESS NOTES
"Magdiel is a pleasant 72 year old year old woman coming in today for physical/annual wellness visit.    Most recent labs looked good, no intervention required. Total cholesterol high but HDL is good and LDL is only 100. Endorses regular aerobic exercise.     She has elevated BP in clinic, reports normal readings at home. Continue to monitor.     Magdiel has scattered purpura, more recently under right eye, also in the past legs, and arms that last for a week before subsiding. Educated patient on benign senile purpura. Advised patient stay away from medication and dietary choices that are blood thinners but that there is no serious medical consequence to this finding.    Seen at [facility]. Upper endoscopy in April was cancelled in the OR due to PACs and SVT seen by anesthesiologist. Needs to be rescheduled, offered to refer here. Was experiencing acid reflux, trouble swallowing, and upper abdominal discomfort. Was prescribed Prilosec after the cancelled endoscopy but unfortunately this was with associated diarrhea 8-10x a day, negative for C.diff. Denies changing her diet recently. Recommended probiotics.     She is up to date on all her healthcare maintenance. No changes to past, family or social history and  ROS: 10 point ROS neg other than the symptoms noted above in the HPI.    /79 (BP Location: Left arm, Patient Position: Sitting, Cuff Size: Adult Small)   Pulse 69   Ht 1.689 m (5' 6.5\")   Wt 57 kg (125 lb 9.6 oz)   SpO2 100%   BMI 19.97 kg/m      Constitutional: no distress, comfortable, pleasant   Eyes: anicteric, normal extra-ocular movements   Ears, Nose and Throat: tympanic membranes clear, neck supple with full range of motion, no thyromegaly.   Cardiovascular: regular rate and rhythm, normal S1 and S2, no murmurs, rubs or gallops, peripheral pulses full and symmetric   Respiratory: clear to auscultation, no wheezes or crackles, normal breath sounds   Gastrointestinal: positive bowel sounds, nontender, no " hepatosplenomegaly, no masses   Musculoskeletal: knees full range of motion, no effusion  Skin: no concerning lesions, no jaundice   Neurological:  normal gait, no tremor   Psychological: appropriate mood   Lymphatic: no cervical lymphadenopathy and no pedal edema    A/P Sarah was seen today for physical.    Diagnoses and all orders for this visit:    Midepigastric pain  -     Adult GI  Referral - Procedure Only; Future    Intermittent dysphagia  -     Adult GI  Referral - Procedure Only; Future    RTC one year, sooner prn    Kelley Hernandez MD        Scribe Disclosure:   I, Walter Gray, am serving as a scribe; to document services personally performed by Dr. Kelley Hernandez- -based on data collection and the provider's statements to me.     Provider Disclosure:  I agree with above History, Review of Systems, Physical exam and Plan.  I have reviewed the content of the documentation and have edited it as needed. I have personally performed the services documented here and the documentation accurately represents those services and the decisions I have made.      Electronically signed by:  Dr. Kelley Hernandez

## 2023-07-14 ENCOUNTER — TELEPHONE (OUTPATIENT)
Dept: GASTROENTEROLOGY | Facility: CLINIC | Age: 73
End: 2023-07-14
Payer: COMMERCIAL

## 2023-07-14 NOTE — TELEPHONE ENCOUNTER
"Endoscopy Scheduling Screen    Have you had a positive Covid test in the last 14 days?  No    Are you active on MyChart?   Yes    What insurance is in the chart?  Other:  [facility]    Ordering/Referring Provider: BILLY ESCOBAR   (If ordering provider performs procedure, schedule with ordering provider unless otherwise instructed. )    BMI: Estimated body mass index is 19.97 kg/m  as calculated from the following:    Height as of 7/12/23: 1.689 m (5' 6.5\").    Weight as of 7/12/23: 57 kg (125 lb 9.6 oz).     Sedation Ordered  moderate sedation.   If patient BMI > 50 do not schedule in ASC.    Are you taking any prescription medications for pain?   No    Are you taking methadone or Suboxone?  No    Do you have a history of malignant hyperthermia or adverse reaction to anesthesia?  No    (Females) Are you currently pregnant?        Have you been diagnosed or told you have pulmonary hypertension?   No    Do you have an LVAD?  No    Have you been told you have moderate to severe sleep apnea?  No    Have you been told you have COPD, asthma, or any other lung disease?  No    Do you have any heart conditions?  Yes     In the past 6 months, have you had any hospitalizations for heart related issues including cardiomyopathy, heart attack, or stent placement?  No    Do you have any implantable devices in your body (pacemaker, ICD)?  No    Do you take nitroglycerine?  No    Have you ever had or are you awaiting a heart or lung transplant?   No    Have you had a stroke or transient ischemic attack (TIA aka \"mini stroke\" in the last 6 months?   No    Have you been diagnosed with or been told you have cirrhosis of the liver?   No    Are you currently on dialysis?   No    Do you need assistance transferring?   No    BMI: Estimated body mass index is 19.97 kg/m  as calculated from the following:    Height as of 7/12/23: 1.689 m (5' 6.5\").    Weight as of 7/12/23: 57 kg (125 lb 9.6 oz).     Is patients BMI > 40 and " scheduling location [facility]?  No    Do you take the medication Phentermine, Ozempic or Wegovy?  No    Do you take the medication Naltrexone?  No    Do you take blood thinners?  No      Prep   Are you currently on dialysis or do you have chronic kidney disease?  No    Do you have a diagnosis of diabetes?  No    Do you have a diagnosis of cystic fibrosis (CF)?  No    On a regular basis do you go 3 -5 days between bowel movements?      BMI > 40?      Preferred Pharmacy:    [id number] [place] [facility] [address] [facility]  [address] [facility] [address]  Phone: [phone] Fax: [phone]      Final Scheduling Details   Colonoscopy prep sent?      Procedure scheduled  Upper endoscopy (EGD)    Surgeon:  JORGE LUIS     Date of procedure:  9/21     Schedule PAC:   No    Location  [facility]    Sedation   Moderate Sedation    Patient Reminders:    You will receive a call from a Nurse to review instructions and health history.  This assessment must be completed prior to your procedure.  Failure to complete the Nurse assessment may result in the procedure being cancelled.       On the day of your procedure, please designate an adult(s) who can drive you home stay with you for the next 24 hours. The medicines used in the exam will make you sleepy. You will not be able to drive.       You cannot take public transportation, ride share services, or non-medical taxi service without a responsible caregiver.  Medical transport services are allowed with the requirement that a responsible caregiver will receive you at your destination.  We require that drivers and caregivers are confirmed prior to your procedure.

## 2023-07-16 ENCOUNTER — TELEPHONE (OUTPATIENT)
Dept: CARDIOLOGY | Facility: CLINIC | Age: 73
End: 2023-07-16
Payer: COMMERCIAL

## 2023-07-16 NOTE — TELEPHONE ENCOUNTER
Received phone call from patient via [facility] phone triage --briefly, patient has HFpEF and history of PACs. She is a patient of Dr. Rees's. She was evaluated by a non-[facility] Electrophysiologist who thought she was at high-risk of atrial fibrillation at advised obtaining an ambulatory monitor.    She tells me her Fitbit informed her last night that she was in atrial fibrillation and again this morning. However, she has had readings of sinus rhythm in the meantime. She feels fine--her normal self, notes that if she didn't have the device on she would not have noticed anything was different.    I recommended that she send the device tracing strips into Dr. Rees, or her electrophysiologist for further assessment and planning. I discussed with her that if she develops symptoms (dizziness, light-headedness, syncope, fatigue, SUAZO) or starts feeling worse that she should present to the ER.    Fish Cruz MD PhD  Cardiac Electrophysiology Fellow  P: Text Page

## 2023-07-17 ENCOUNTER — TELEPHONE (OUTPATIENT)
Dept: CARDIOLOGY | Facility: CLINIC | Age: 73
End: 2023-07-17
Payer: COMMERCIAL

## 2023-07-17 NOTE — TELEPHONE ENCOUNTER
[facility] Call Center    Phone Message    May a detailed message be left on voicemail: yes     Reason for Call: Other: Pt would like a call back asap as she is in afib and is not able to get readings from watch sent over, she declined to speak to a triage nurse and wants to speak to her care team     Action Taken: Message routed to:  [facility] Cardio    Travel Screening: Not Applicable

## 2023-07-17 NOTE — TELEPHONE ENCOUNTER
Returned call to jan. She reports she has been reading normal sinus rhythm on her fitbit since she started using it about 10 days ago. She reports on Saturday morning, she took a reading which said AFIB and then a short while later it said normal sinus rhythm again. She reports the same thing happened Sunday morning and again this morning. Unfortunately she is unable to attain the rhythm strips or export this data for our review. She is wondering if the watch is accurate, if she is going in and our of afib or what she should do next. She reports she feels a little weaker recently and notices some episodes where she feels a little dizzy. Reports her heart rates are generally in the 70s but she did have one reading where it was 100bpm.    Will update provider and call her back.

## 2023-07-18 ENCOUNTER — TELEPHONE (OUTPATIENT)
Dept: CARDIOLOGY | Facility: CLINIC | Age: 73
End: 2023-07-18
Payer: COMMERCIAL

## 2023-07-18 DIAGNOSIS — R00.2 PALPITATIONS: Primary | ICD-10-CM

## 2023-07-18 NOTE — TELEPHONE ENCOUNTER
[facility] Call Center    Phone Message    May a detailed message be left on voicemail: yes     Reason for Call: Other: Magdiel was able to download the copy of her EKG that Dr. Rees requested and will be faxing it over to the [facility] so please be on the lookout for it. No further action is needed. Thank you!     Action Taken: Other: Cardiology    Travel Screening: Not Applicable     Thank you!  [facility]

## 2023-07-19 NOTE — TELEPHONE ENCOUNTER
Date: 7/19/2023    Time of Call: 4:20 PM     Diagnosis:  palpitations     [ VORB ] Ordering provider: Negrita Rees MD    Order: 7 day ziopatch monitor     Order received by: Cyndee Fischer RN      Follow-up/additional notes: order placed. Communicated via [employer].

## 2023-07-20 ENCOUNTER — TELEPHONE (OUTPATIENT)
Dept: GASTROENTEROLOGY | Facility: CLINIC | Age: 73
End: 2023-07-20
Payer: COMMERCIAL

## 2023-07-20 NOTE — TELEPHONE ENCOUNTER
Caller: Magdiel  Reason for Reschedule/Cancellation (please be detailed, any staff messages or encounters to note?): Wanted a different provider      Prior to reschedule please review:    Ordering Provider: Kelley Alves    Sedation per order: Moderate    Does patient have any ASC Exclusions, please identify?: N      Notes on Cancelled Procedure:    Procedure: Upper Endoscopy [EGD]     Date: 9/21/23    Location: [facility]; [address] [place] [address]    Surgeon: Reina      Rescheduled: Yes    Procedure: Upper Endoscopy [EGD]     Date: 9/22/23    Location: [facility]; [address] [place] [address]    Surgeon: Pablo    Sedation Level Scheduled  Moderate,  Reason for Sedation Level Per order    Prep/Instructions updated and sent: Yes     Send In - basket message to Panc - Bradley Pool if EUS  procedure is canceled or rescheduled: [ N/A, YES or NO] N/A

## 2023-07-21 ENCOUNTER — TELEPHONE (OUTPATIENT)
Dept: INTERNAL MEDICINE | Facility: CLINIC | Age: 73
End: 2023-07-21
Payer: COMMERCIAL

## 2023-07-21 NOTE — TELEPHONE ENCOUNTER
[facility] Call Center    Phone Message    May a detailed message be left on voicemail: yes     Reason for Call: Other: Patient requesting an order for a 12 lead EKG, patient requesting if this can be done on Monday 7/24/23. Please call her at [phone] to discuss options. Thank you     Action Taken: Message routed to:  [facility] pcc    Travel Screening: Not Applicable

## 2023-07-21 NOTE — TELEPHONE ENCOUNTER
I spoke with Magdiel today to follow up on her message. She reported that she has known SVT, has been seen by [place] previously who recommended she get a watch with EKG capabilities. She noted that she has a few episodes of possible afib this week per her watch. She notes that she always has palpitations with SVT, though maybe felt them more frequently this past week. She also noted one episode of brief lightheadedness that she is unsure was related.    She sent her watch monitor readings to her EP cardiologist in [place]. They stated they could not tell if she was having afib based on the watch reading, recommended she get an EKG. She notes that [place] advised she check with PCP to see if this can be ordered and completed locally, then the patient would send to [place].    Magdiel denies current symptoms outside baseline. We discussed seeking immediate evaluation if she again experiences lightheadedness, any symptoms lasting outside of her baseline, or any new symptoms. She voiced understanding.     Will send a message to PCP to see if an EKG order is possible for this patient who has been in communication with her cardiology team. Will also schedule patient for an appointment with PCP to further discuss if needed.    Michelle Mckay RN ([facility])

## 2023-07-24 NOTE — TELEPHONE ENCOUNTER
RN had a call from cardiology clinic, and they will mail the Zio Patch to patient so she can hook up and start monitoring.    Nichole Hernandez RN on 7/24/2023 at 1:31 PM

## 2023-07-26 ENCOUNTER — HOSPITAL ENCOUNTER (OUTPATIENT)
Dept: CARDIOLOGY | Facility: CLINIC | Age: 73
Discharge: HOME | End: 2023-07-26
Attending: INTERNAL MEDICINE | Admitting: INTERNAL MEDICINE
Payer: COMMERCIAL

## 2023-07-26 DIAGNOSIS — R00.2 PALPITATIONS: ICD-10-CM

## 2023-07-26 PROCEDURE — 93242 EXT ECG>48HR<7D RECORDING: CPT

## 2023-07-26 PROCEDURE — 93244 EXT ECG>48HR<7D REV&INTERPJ: CPT | Performed by: INTERNAL MEDICINE

## 2023-08-17 ENCOUNTER — HOSPITAL ENCOUNTER (OUTPATIENT)
Dept: NUCLEAR MEDICINE | Facility: CLINIC | Age: 73
Setting detail: NUCLEAR MEDICINE
Discharge: HOME | End: 2023-08-17
Attending: INTERNAL MEDICINE
Payer: COMMERCIAL

## 2023-08-17 DIAGNOSIS — R06.02 SOB (SHORTNESS OF BREATH): ICD-10-CM

## 2023-08-17 DIAGNOSIS — I50.30 HEART FAILURE WITH PRESERVED EJECTION FRACTION, UNSPECIFIED HF CHRONICITY (H): ICD-10-CM

## 2023-08-17 PROCEDURE — A9538 TC99M PYROPHOSPHATE: HCPCS | Performed by: INTERNAL MEDICINE

## 2023-08-17 PROCEDURE — 78830 RP LOCLZJ TUM SPECT W/CT 1: CPT | Mod: 26

## 2023-08-17 PROCEDURE — 78830 RP LOCLZJ TUM SPECT W/CT 1: CPT

## 2023-08-17 PROCEDURE — 343N000001 HC RX 343: Performed by: INTERNAL MEDICINE

## 2023-08-17 RX ORDER — TECHNETIUM TC99M PYROPHOSPHATE 12 MG/10ML
15 INJECTION INTRAVENOUS ONCE
Status: COMPLETED | OUTPATIENT
Start: 2023-08-17 | End: 2023-08-17

## 2023-08-17 RX ADMIN — TECHNETIUM TC99M PYROPHOSPHATE 15.2 MILLICURIE: 12 INJECTION INTRAVENOUS at 11:54

## 2023-08-23 ENCOUNTER — TRANSFERRED RECORDS (OUTPATIENT)
Dept: HEALTH INFORMATION MANAGEMENT | Facility: CLINIC | Age: 73
End: 2023-08-23
Payer: COMMERCIAL

## 2023-08-28 ENCOUNTER — VIRTUAL VISIT (OUTPATIENT)
Dept: INTERNAL MEDICINE | Facility: CLINIC | Age: 73
End: 2023-08-28
Payer: COMMERCIAL

## 2023-08-28 DIAGNOSIS — R00.2 PALPITATIONS: Primary | ICD-10-CM

## 2023-08-28 PROCEDURE — 99214 OFFICE O/P EST MOD 30 MIN: CPT | Mod: 95 | Performed by: INTERNAL MEDICINE

## 2023-08-28 NOTE — PROGRESS NOTES
Magdiel is here to follow up a cardiac workup for palpitations.  Her cardiac MRI was negative or normal with no evidence of sarcoid or amyloid; her home monitor showed frequent PACs and a few runs of SVT.   Discussed medications versus vagal maneuvers and she would prefer to not take daily medication for this.    Also, saw Ortho last week for a swelling over the left tibia. Initial imaging appeared reassuring; she was told it was likely not a tumor.  Has a MRI ordered for this week.  There is mild pain or discomfort in the posterior calf as well.  Discussed possibility of a Baker's cyst versus phlebitis; DVT seems unlikely since the swelling is so localized, and reports no increased ankle edema or calf circumference.  Still, topical voltaren gel and compression stockings may help.    I also reviewed HCM, due for new shingles vaccine which she can get at her local pharmacy.  Will also check TSH and lipid panel at next year's physical.  Otherwise up to date.    On exam, she is alert, in NAD.  No cough or wheezing is noted.  Visible skin is without rash or erythema.  Mood/affect seem upbeat.    A/P: Palpitations likely of a benign nature.  Discussed vagal maneuvers such as cough, taking a deep breath and holding it or bearing down.  Await MRI results in terms of localized leg swelling; meanwhile trial of voltaren gel and compression.    RTC next July for routine physical, sooner prn.    Kelley Hernandez MD      Virtual Visit Details    Type of service:  Video Visit started 9:48 ended 10:02 with another 15 minutes chart review and documentation same day    Originating Location (pt. Location): Home    Distant Location (provider location):  On-site  Platform used for Video Visit: Macrina

## 2023-08-28 NOTE — PATIENT INSTRUCTIONS
I recommend topical voltaren gel for knee or calf pain, can apply up to four times daily, available over the counter.  Compression stockings will also help.  25 mm-30 mm of Mercury compression is a good starting point.  These are available at [employer] or on Amazon.    Thank you for visiting the Primary Care Center today at the [facility]! The following is some information about our clinic:     Primary Care Center Frequently-Asked Questions    (1) How do I schedule appointments at the [facility] [place]?     Primary Care--to schedule or make changes to an existing appointment, please call our primary care line at [phone].    Labs--to schedule a lab appointment at the [facility] you can use [employer] or call [phone]. If you have a [place] location that is closer to home, you can reach out to that location for scheduling options.     Imaging--if you need to schedule a CT, X-ray, MRI, ultrasound, or other imaging study you can call [phone] to schedule at the [facility] or any other [facility] imaging location.     Referrals--if a referral to another specialty was ordered you can expect a phone call from their scheduling team. If you have not heard from them in a week, please call us or send us a QMCODES message to check the status or get a scheduling number. Please note that this only applies to internal [facility] referrals. If the referral is external you would need to contact their office for scheduling.     (2) I have a question about my visit, who do I contact?     You can call us at the primary care line at [phone] to ask questions about your visit. You can also send a secure message through [employer], which is reviewed by clinic staff. Please note that [employer] messages have a twenty-four to forty-eight business hour turnaround time and should not be used for urgent concerns.    (3) How will I get the results of my  tests?    If you are signed up for GRID all tests will be released to you within twenty-four hours of resulting. Please allow three to five days for your doctor to review your results and place a note interpreting the results. If you do not have [employer] you will receive your results through mail seven to ten business days following the return of the tests. Please note that if there should be any urgent or concerning results that your doctor or their registered nurse will reach out to you the same day as the tests come back. If you have follow up questions about your results or would like to discuss the results in detail please schedule a follow up with your provider either in person or virtually.     (4) How do I get refills of my prescriptions?     You should always first contact your pharmacy for refills of your medications. If submitting a refill request on GRID, please be sure to submit the request only once--repeat requests can cause delays in refill. If you are requesting a NEW medication or a medication related to new symptoms you will need to schedule an appointment with a provider prior to approval. Please note: Routine medication refills have up to one to three business day turnaround whereas controlled substances refills have up to five to seven business day turnaround.    (5) I have new symptoms, what do I do?     If you are having an immediate medical emergency, you should dial 911 for assistance.   For anything urgent that needs to be seen within a few hours to one day you should visit a local urgent care for assistance.  For non-urgent symptoms that need to be seen within a few days to a week you can schedule with an available provider in primary care by going to [employer] or calling [phone].   If you are not sure how serious your symptoms are or you would like to receive medical advice you can always call [phone] to speak with a triage nurse.

## 2023-08-28 NOTE — NURSING NOTE
Is the patient currently in the state of [place]? YES    Visit mode:VIDEO    If the visit is dropped, the patient can be reconnected by: VIDEO VISIT: Text to cell phone:   Telephone Information:   Mobile [phone]       Will anyone else be joining the visit? NO  (If patient encounters technical issues they should call [phone])    How would you like to obtain your AVS? MyChart    Are changes needed to the allergy or medication list? N/A    Reason for visit: RECHECK (ZioPatch review)    Radha WHITMOREF

## 2023-09-08 ENCOUNTER — TELEPHONE (OUTPATIENT)
Dept: GASTROENTEROLOGY | Facility: CLINIC | Age: 73
End: 2023-09-08
Payer: COMMERCIAL

## 2023-09-08 NOTE — TELEPHONE ENCOUNTER
Attempted to contact patient in order to complete pre assessment questions.     No answer.       Procedure details:    Patient scheduled for Upper endoscopy (EGD) on 9.22.2023.     Arrival time: 1145. Procedure time 1230    Pre op exam needed? N/A    Facility location: [facility]; [address] [place] [address] [place] [address]    Sedation type: Conscious sedation     Indication for procedure:   Midepigastric pain [R10.13]  - Primary      Intermittent dysphagia [R13.19]            Chart review:     Electronic implanted devices? No    Diabetic? No    Diabetic medication HOLDING recommendations: (if applicable)  Oral diabetic medications: N/A  Diabetic injectables: N/A  Insulin: N/A      Medication review:    Anticoagulants? No    NSAIDS? No NSAID medications per patient's medication list.  RN will verify with pre-assessment call.    Other medication HOLDING recommendations:  N/A      Prep for procedure:       Prep instructions sent via What's Trending.       Enedina Gomez RN  Endoscopy Procedure Pre Assessment RN

## 2023-09-08 NOTE — TELEPHONE ENCOUNTER
Pre assessment completed for upcoming procedure.   (Please see previous telephone encounter notes for complete details)    Patient  returned call.       Procedure details:    Arrival time and facility location reviewed.    Pre op exam needed? N/A    Designated  policy reviewed. Instructed to have someone stay 6 hours post procedure.     COVID policy reviewed.      Medication review:    Medications reviewed. Please see supporting documentation below. Holding recommendations discussed (if applicable).       Prep for procedure:     Procedure prep instructions reviewed.        Additional information needed?  N/A      Patient  verbalized understanding and had no questions or concerns at this time.      Azul Ivory RN  Endoscopy Procedure Pre Assessment RN  [phone] option 4

## 2023-09-11 ENCOUNTER — ANCILLARY PROCEDURE (OUTPATIENT)
Dept: BONE DENSITY | Facility: CLINIC | Age: 73
End: 2023-09-11
Attending: FAMILY MEDICINE
Payer: COMMERCIAL

## 2023-09-11 DIAGNOSIS — M81.0 SENILE OSTEOPOROSIS: ICD-10-CM

## 2023-09-11 PROCEDURE — 77080 DXA BONE DENSITY AXIAL: CPT | Performed by: INTERNAL MEDICINE

## 2023-09-19 ENCOUNTER — LAB (OUTPATIENT)
Dept: LAB | Facility: CLINIC | Age: 73
End: 2023-09-19
Payer: COMMERCIAL

## 2023-09-19 DIAGNOSIS — Z92.29 HX DRUG THERAPY: ICD-10-CM

## 2023-09-19 DIAGNOSIS — M81.0 SENILE OSTEOPOROSIS: Primary | ICD-10-CM

## 2023-09-19 DIAGNOSIS — M81.0 SENILE OSTEOPOROSIS: ICD-10-CM

## 2023-09-19 LAB
ANION GAP SERPL CALCULATED.3IONS-SCNC: 8 MMOL/L (ref 7–15)
BUN SERPL-MCNC: 23.4 MG/DL (ref 8–23)
CALCIUM SERPL-MCNC: 9.3 MG/DL (ref 8.8–10.2)
CHLORIDE SERPL-SCNC: 97 MMOL/L (ref 98–107)
CREAT SERPL-MCNC: 0.83 MG/DL (ref 0.51–0.95)
DEPRECATED HCO3 PLAS-SCNC: 25 MMOL/L (ref 22–29)
EGFRCR SERPLBLD CKD-EPI 2021: 74 ML/MIN/1.73M2
GLUCOSE SERPL-MCNC: 88 MG/DL (ref 70–99)
POTASSIUM SERPL-SCNC: 4.3 MMOL/L (ref 3.4–5.3)
SODIUM SERPL-SCNC: 130 MMOL/L (ref 136–145)

## 2023-09-19 PROCEDURE — 80048 BASIC METABOLIC PNL TOTAL CA: CPT

## 2023-09-19 PROCEDURE — 36415 COLL VENOUS BLD VENIPUNCTURE: CPT

## 2023-09-19 NOTE — PROGRESS NOTES
ASSESSMENT:  This is a 73-year-old postmenopausal female who has osteoporosis by T-scores.  She has received IV Reclast in the past in 2019 and 2021.  She started on Prolia in August 2022 because she had reported loss of bone density in the spine.  She is due for Prolia today.  She is doing well without side effects.  We discussed calcium and vitamin D.  Her next Prolia will be mid March.  We reviewed her recent DEXA which does show increase in the spine and that the hips are stable.  We will continue with Prolia.    PLAN:  Prolia today  Next prolia in mid March - call early March to schedule  Blood work in 2 wks  Try to increase calcium to 1200mg/day - diet and supplements  Continue vit D  Try to exercise 5d/wk    See me 1 year     For low sodium try fluid restriction to 1000cc/day - check with cardiology about adding salt.   .  Thank you for letting me participate in the care of your patient.   Evelyn Hightower MD, PhD  CC Obi Ivory MD     Time note (e4, 30'): The total of my time (on the date of service) for this service was 30 minutes, including discussion/face-to-face, chart review, interpretation not otherwise reported, documentation, and updating of the computerized record.        Sarah is a  73 year old female post menopausal] [GR2, [id number]] that presents today for osteoporosis follow up patient was last seen 3/2023. She has received in the past IV Reclast in 2019 and 2021. We started Prolia in August 2022 based on her T-scores and report of loss of bone density in the spine. She is due for Prolia.   Referring Physician: Obi Ivory MD     HPI     Have you ever had a bone density test? Yes  Where = [facility]  When = 9/2023  Spine Tscore = -0.1  increase 4.7%   Left neck Tscore = -2.3  Total left hip Tscore = -1.5  Right neck Tscore = -1.9  Total Right hip Tscore = -0.9  hips stable   Have you received any x-ray dye or contrast in the last ten days? No  How many servings of dairy products do you consume  per day? 2 Type: milk, sardines, cheese, yogurt   Do you take a multi-vitamin daily? No  Do you take a vitamin D supplement? Yes 2000IU every other day   Do you take a calcium supplement daily?  No  Do you take a supplement containing strontium? No  Are you exposed to natural sunlight at least 20 minutes three times a week? Yes    Social History   reports that she quit smoking about 43 years ago. Her smoking use included cigarettes. She started smoking about 55 years ago. She has a 6.00 pack-year smoking history. She has never used smokeless tobacco. She reports that she does not currently use alcohol after a past usage of about 1.0 - 2.0 standard drink of alcohol per week. She reports that she does not use drugs.  Do you smoke cigarettes? Reformed smoker  Do you exercise? Yes. Details: 30-45 min walking 3 days/wk   Do you drink alcohol? Yes. Details: rarely     Medication History  Have you used any of the following medications?    Actonel (Risedronate): No              Aredia (Pamidronate): No              Boniva (Ibindronate): No              Didronil (Etidronate): No              Evista (Raloxifene): No              Fosamax (Alendronate): No              Forteo (Parathyroid hormone) injections: No              HCTZ (Thiazide): No              Calcitonin nasal spray: No              Reclast or Zometa (Zolendronate): 4/2019 and 4/2021  - cancelled the 3rd IV reclast in 4/2022 because has bone loss in jaw                Prolia (Denosumab): yes started 8/2022 - due today               HT: yes for 1 yr  20 yrs ago      Current Outpatient Medications   Medication Sig Dispense Refill    albuterol (PROAIR HFA/PROVENTIL HFA/VENTOLIN HFA) 108 (90 Base) MCG/ACT inhaler Inhale 2 puffs into the lungs every 6 hours as needed for shortness of breath, wheezing or cough 18 g 1    Cholecalciferol (VITAMIN D3) 1000 UNITS CAPS Take 1 capsule by mouth daily      Famotidine-Ca Carb-Mag Hydrox (PEPCID COMPLETE PO) Take 1 capsule by  mouth as needed      fluocinonide (LIDEX) 0.05 % external gel Apply topically 2 times daily 15 g 3    Psyllium (METAMUCIL FREE & NATURAL) 43 % POWD  (Patient not taking: Reported on 7/10/2023)            Allergies   Allergen Reactions    Atorvastatin Rash    Nickel Rash     Other reaction(s): *Unknown  Other reaction(s): *Unknown       Past Medical History    Family History   Problem Relation Age of Onset    Cerebrovascular Disease Father          CVA     Other - See Comments Mother          58 scleroderma    Heart Failure Maternal Grandfather     Heart Disease Maternal Grandfather         CHF    Diabetes Maternal Grandmother         Type 1    Cancer Paternal Grandmother         Endometrial CA of uterus    Breast Cancer Paternal Aunt     Diabetes Cousin         Type 1    Glaucoma No family hx of     Macular Degeneration No family hx of        ROS:  General: none  Head/Eyes: none  Ears/Nose/Throat: none  Cardiovascular: palpitations  Respiratory: none  Gastrointestinal: heartburn  Breast: none  Genitourinary: none  Sexual Function: none  Musculoskeletal: none  Skin: none  Neurological: none  Mental Health: none  Endocrine: has low sodium - has CHF so can't use salt      Clinic Measurements  Vitals: /73 (BP Location: Left arm, Patient Position: Sitting, Cuff Size: Adult Regular)   Pulse 65   Wt 56.6 kg (124 lb 12.8 oz)   SpO2 99%   BMI 19.84 kg/m    BMI= Body mass index is 19.84 kg/m .    Physical exam  Constitutional: Well appearing woman in no acute distress.   Psychological: appropriate mood.  Neck: No thyroidmegaly.  no carotid bruits.  Cardiovascular: regular rate and rhythm, normal S1 and S2, no murmurs, rubs or gallops, peripheral pulses present   Respiratory: clear to auscultation, no wheezes or crackles, normal breath sounds.  Musculoskeletal: good range of motion, no edema, and motor strength is equal in the upper and lower extremities    Spine: Straight, not tender, Flexion good,  Extension good, Lateral movement good, Rotational movement good  Skin: no concerning lesions, no jaundice.  Neurological: cranial nerves intact, normal strength, reflexes at patella and biceps normal, normal gait, no tremor.     LAB  Vertebra; Fracture Assessment: NA  Dexa Scan: 9/2023    FRAX Assessment Tool: [N/A,  Risk Factors: PM, T scores, age,  Reformed smoker +FHx      Evelyn Hightower MD, PhD

## 2023-09-20 ENCOUNTER — OFFICE VISIT (OUTPATIENT)
Dept: FAMILY MEDICINE | Facility: CLINIC | Age: 73
End: 2023-09-20
Payer: COMMERCIAL

## 2023-09-20 VITALS
DIASTOLIC BLOOD PRESSURE: 73 MMHG | BODY MASS INDEX: 19.84 KG/M2 | OXYGEN SATURATION: 99 % | HEART RATE: 65 BPM | SYSTOLIC BLOOD PRESSURE: 122 MMHG | WEIGHT: 124.8 LBS

## 2023-09-20 DIAGNOSIS — M81.0 SENILE OSTEOPOROSIS: Primary | ICD-10-CM

## 2023-09-20 DIAGNOSIS — Z92.29 PERSONAL HISTORY OF OTHER DRUG THERAPY: ICD-10-CM

## 2023-09-20 PROCEDURE — 96372 THER/PROPH/DIAG INJ SC/IM: CPT | Performed by: FAMILY MEDICINE

## 2023-09-20 PROCEDURE — 99214 OFFICE O/P EST MOD 30 MIN: CPT | Mod: 25 | Performed by: FAMILY MEDICINE

## 2023-09-20 RX ORDER — FAMOTIDINE 10 MG
10 TABLET ORAL 2 TIMES DAILY
COMMUNITY
End: 2024-02-16

## 2023-09-20 NOTE — NURSING NOTE
Sarah العلي is a 73 year old female patient that presents today in clinic for the following:    Chief Complaint   Patient presents with    Follow Up     6 months follow up -  Prolia injection per Dr. Hightower      The patient's allergies and medications were reviewed as noted. A set of vitals were recorded as noted without incident: /73 (BP Location: Left arm, Patient Position: Sitting, Cuff Size: Adult Regular)   Pulse 65   Wt 56.6 kg (124 lb 12.8 oz)   SpO2 99%   BMI 19.84 kg/m  . The patient does not have any other questions for the provider.    Ericka Waggoner, EMT at 11:15 AM on 9/20/2023

## 2023-09-20 NOTE — PATIENT INSTRUCTIONS
Prolia today  Next prolia in mid March - call early March to schedule  Blood work in 2 wks  Try to increase calcium to 1200mg/day - diet and supplements  Continue vit D  Try to exercise 5d/wk    See me 1 year     For low sodium try fluid restriction to 1000cc/day - check with cardiology about adding salt.       Prolia     It was a pleasure seeing you in the clinic today. You received an injection of Prolia on 9/20/2023. Prolia injections are administered every 6 months.     Next steps:         Your next anticipated Prolia injection is 03/20/2024    On or around 02/20/2024, please call [phone] to schedule your next Prolia injection. Please notify the Nurse that you will need to complete Prolia injection lab work.

## 2023-09-20 NOTE — PROGRESS NOTES
Sarah العلي received the Prolia injection today in clinic at the request of Dr. Hightower. The injection was given under the supervision of Dr. Hightower if assistance was needed. The patient does not report a history of adverse reactions associated with medication/injection administration. The injection site was cleaned with an alcohol prep wipe. The injection was given without incident--see MAR list for administration details. The patient was advised to remain in fourth floor lobby of the [facility] [place] for fifteen minutes after the injection in case of an adverse reaction.     Prolia injections are administered every 6 months. The patient's next anticipated Prolia injection is 03/20/2024. The patient was provided an injection schedule and was instructed to call the [facility] Scheduling Number to schedule their next Prolia injection. They were also instructed to notify the Nurse that they will need to have orders for a Basic Metabolic Panel placed and that their lab work must be completed no more than 30 days before their next Prolia injection.      ANDERSON Choi at 12:14 PM on 9/20/2023.

## 2023-09-21 ENCOUNTER — TELEPHONE (OUTPATIENT)
Dept: INTERNAL MEDICINE | Facility: CLINIC | Age: 73
End: 2023-09-21
Payer: COMMERCIAL

## 2023-09-21 NOTE — TELEPHONE ENCOUNTER
Patient called in stating she had some labs done and wanted to make sure she could still have the upcoming EGD procedure.     Patient reports sodium level of 130.  OK to proceed with procedure as scheduled.     Patient verbalized understanding and had no further questions.    Asha Dye RN  Endoscopy Procedure Pre-Assessment RN

## 2023-09-21 NOTE — TELEPHONE ENCOUNTER
[facility] Call Center    Phone Message    May a detailed message be left on voicemail: yes     Reason for Call: Patient called in says she is needing an answer regarding her serasodium, please call pt back for further discussion. Patient has a procedure tomorrow at 9am and needs a response before then. She ernesto not been able to get ahold of her cardiologist.         Action Taken: Message routed to:  [facility] ([facility] pcc    Travel Screening: Not Applicable

## 2023-09-22 ENCOUNTER — HOSPITAL ENCOUNTER (OUTPATIENT)
Facility: CLINIC | Age: 73
Discharge: HOME | End: 2023-09-22
Attending: INTERNAL MEDICINE | Admitting: INTERNAL MEDICINE
Payer: COMMERCIAL

## 2023-09-22 VITALS
DIASTOLIC BLOOD PRESSURE: 67 MMHG | OXYGEN SATURATION: 98 % | SYSTOLIC BLOOD PRESSURE: 110 MMHG | RESPIRATION RATE: 14 BRPM | HEART RATE: 69 BPM

## 2023-09-22 DIAGNOSIS — K21.9 GASTROESOPHAGEAL REFLUX DISEASE WITHOUT ESOPHAGITIS: Primary | ICD-10-CM

## 2023-09-22 LAB — UPPER GI ENDOSCOPY: NORMAL

## 2023-09-22 PROCEDURE — 250N000011 HC RX IP 250 OP 636: Performed by: INTERNAL MEDICINE

## 2023-09-22 PROCEDURE — 250N000009 HC RX 250: Performed by: INTERNAL MEDICINE

## 2023-09-22 PROCEDURE — 88305 TISSUE EXAM BY PATHOLOGIST: CPT | Mod: TC | Performed by: INTERNAL MEDICINE

## 2023-09-22 PROCEDURE — 999N000099 HC STATISTIC MODERATE SEDATION < 10 MIN: Performed by: INTERNAL MEDICINE

## 2023-09-22 PROCEDURE — 43239 EGD BIOPSY SINGLE/MULTIPLE: CPT | Performed by: INTERNAL MEDICINE

## 2023-09-22 RX ORDER — FENTANYL CITRATE 50 UG/ML
INJECTION, SOLUTION INTRAMUSCULAR; INTRAVENOUS PRN
Status: DISCONTINUED | OUTPATIENT
Start: 2023-09-22 | End: 2023-09-22 | Stop reason: HOSPADM

## 2023-09-22 RX ORDER — PANTOPRAZOLE SODIUM 40 MG/1
40 TABLET, DELAYED RELEASE ORAL DAILY
Qty: 30 TABLET | Refills: 11 | Status: SHIPPED | OUTPATIENT
Start: 2023-09-22 | End: 2023-11-14

## 2023-09-22 ASSESSMENT — ACTIVITIES OF DAILY LIVING (ADL): ADLS_ACUITY_SCORE: 35

## 2023-09-22 NOTE — H&P
"[facility] Anesthesia Pre-op History and Physical    Sarah العلي MRN# [id number]   Age: 73 year old YOB: 1950      Date of Surgery: 9/22/2023 [facility]      Date of Exam 9/22/2023 Facility (In hospital)       Home clinic:   Primary care provider: Kelley Alves         Chief Complaint and/or Reason for Procedure:   No chief complaint on file.    EGD. dysphagia       Active problem list:     Patient Active Problem List    Diagnosis Date Noted    Aneurysm of carotid artery (H) 12/19/2022     Priority: Medium    Age-related osteoporosis without current pathological fracture 02/22/2019     Priority: Medium    Osteopenia of left hip 12/12/2018     Priority: Medium    White coat syndrome without diagnosis of hypertension 12/06/2018     Priority: Medium    Bilateral hearing loss, unspecified hearing loss type 12/06/2018     Priority: Medium    Right internal carotid artery aneurysm 12/06/2018     Priority: Medium     5 mm      Erythema multiforme 09/26/2016     Priority: Medium    Hematuria 09/26/2016     Priority: Medium     Overview:   Has had urologic evaluation      Laryngospasm 09/26/2016     Priority: Medium    Ocular migraine 09/26/2016     Priority: Medium    Oral lichen planus 09/26/2016     Priority: Medium    Physical exam 09/26/2016     Priority: Medium     Overview:   Full code.  Would want her  and son to make medical decisions for her if she were unable to do so.  Does not have an advanced directive.       Overview:   Diet - tries to eat a healthy diet   Exercise - \"I'm a fair weather walker\"  Active during the day  Mammogram - 10/2017   Colonoscopy - 2/2008 - next in 2018   DXA - 8/2015 - next in 2018   Immunizations -  Up to date       CARDIOVASCULAR SCREENING; LDL GOAL LESS THAN 160 07/17/2013     Priority: Medium    GERD (gastroesophageal reflux disease)      Priority: Medium            Medications (include herbals and vitamins): "   Any Plavix use in the last 7 days? No     No current facility-administered medications for this encounter.     Current Outpatient Medications   Medication Sig    albuterol (PROAIR HFA/PROVENTIL HFA/VENTOLIN HFA) 108 (90 Base) MCG/ACT inhaler Inhale 2 puffs into the lungs every 6 hours as needed for shortness of breath, wheezing or cough    Cholecalciferol (VITAMIN D3) 1000 UNITS CAPS Take 1 capsule by mouth daily    famotidine (PEPCID) 10 MG tablet Take 10 mg by mouth 2 times daily    Famotidine-Ca Carb-Mag Hydrox (PEPCID COMPLETE PO) Take 1 capsule by mouth as needed (Patient not taking: Reported on 9/20/2023)    fluocinonide (LIDEX) 0.05 % external gel Apply topically 2 times daily    Psyllium (METAMUCIL FREE & NATURAL) 43 % POWD  (Patient not taking: Reported on 7/10/2023)             Allergies:      Allergies   Allergen Reactions    Atorvastatin Rash    Nickel Rash     Other reaction(s): *Unknown  Other reaction(s): *Unknown     Allergy to Latex? No  Allergy to tape?   No  Intolerances:             Physical Exam:   All vitals have been reviewed  No data found.  No intake/output data recorded.  Lungs:   No increased work of breathing, good air exchange, clear to auscultation bilaterally, no crackles or wheezing     Cardiovascular:   Normal apical impulse, regular rate and rhythm, normal S1 and S2, no S3 or S4, and no murmur noted             Lab / Radiology Results:            Anesthetic risk and/or ASA classification:       Brian Shah MD

## 2023-09-22 NOTE — LETTER
September 25, 2023      Magdiel العلي  [address] [address]  [place] [address]        Dear ,    We are writing to inform you of your test results.    Your test results fall within the expected range(s) or remain unchanged from previous results.  Please continue with current treatment plan.    Resulted Orders   Surgical Pathology Exam   Result Value Ref Range    Case Report       Surgical Pathology Report                         Case: [id number]                                  Authorizing Provider:  Brian Shah MD        Collected:           09/22/2023 12:30 PM          Ordering Location:     [facility]          Received:            09/22/2023 02:06 PM                                 [facility]                                                          Pathologist:           Blanca Rodriguez MD PhD                                                      Specimen:    Esophagus, r/o EOE                                                                         Final Diagnosis       A(1).  Esophagus, distal, biopsy:  -Squamous mucosa with no significant pathological changes.  -Negative for intestinal metaplasia.  -Negative for acute or eosinophilic esophagitis.  -Negative for dysplasia or malignancy.          Clinical Information       Procedure:  Esophagoscopy, gastroscopy, duodenoscopy (EGD), combined  Pre-op Diagnosis: Midepigastric pain [R10.13]  Intermittent dysphagia [R13.19]  Post-op Diagnosis: R10.13 - Midepigastric pain [ICD-10-CM]  R13.19 - Intermittent dysphagia [ICD-10-CM]      Gross Description       A(1). Esophagus, r/o EOE:  The specimen is received in formalin, labeled with the patient's name, medical record number and other identifying information and designated  esophagus . It consists of 5 tan soft tissue fragments ranging from 0.2-0.4 cm. Entirely submitted in one cassette.   (ALEKSANDAR Hagen)      Microscopic Description       Microscopic examination was  performed.      Performing Labs       The technical component of this testing was completed at [facility]      Case Images         If you have any questions or concerns, please call the clinic at the number listed above.       Sincerely,      Brian Shah MD

## 2023-09-25 LAB
PATH REPORT.COMMENTS IMP SPEC: NORMAL
PATH REPORT.COMMENTS IMP SPEC: NORMAL
PATH REPORT.FINAL DX SPEC: NORMAL
PATH REPORT.GROSS SPEC: NORMAL
PATH REPORT.MICROSCOPIC SPEC OTHER STN: NORMAL
PATH REPORT.RELEVANT HX SPEC: NORMAL
PHOTO IMAGE: NORMAL

## 2023-09-25 PROCEDURE — 88305 TISSUE EXAM BY PATHOLOGIST: CPT | Mod: 26 | Performed by: PATHOLOGY

## 2023-10-03 ENCOUNTER — LAB (OUTPATIENT)
Dept: LAB | Facility: CLINIC | Age: 73
End: 2023-10-03
Payer: COMMERCIAL

## 2023-10-03 DIAGNOSIS — M81.0 SENILE OSTEOPOROSIS: ICD-10-CM

## 2023-10-03 PROCEDURE — 84100 ASSAY OF PHOSPHORUS: CPT

## 2023-10-03 PROCEDURE — 82310 ASSAY OF CALCIUM: CPT

## 2023-10-03 PROCEDURE — 83735 ASSAY OF MAGNESIUM: CPT

## 2023-10-03 PROCEDURE — 36415 COLL VENOUS BLD VENIPUNCTURE: CPT

## 2023-10-04 LAB
CALCIUM SERPL-MCNC: 9.3 MG/DL (ref 8.8–10.2)
MAGNESIUM SERPL-MCNC: 2.2 MG/DL (ref 1.7–2.3)
PHOSPHATE SERPL-MCNC: 3.3 MG/DL (ref 2.5–4.5)

## 2023-11-14 ENCOUNTER — MYC REFILL (OUTPATIENT)
Dept: INTERNAL MEDICINE | Facility: CLINIC | Age: 73
End: 2023-11-14

## 2023-11-14 ENCOUNTER — OFFICE VISIT (OUTPATIENT)
Dept: FAMILY MEDICINE | Facility: CLINIC | Age: 73
End: 2023-11-14
Payer: COMMERCIAL

## 2023-11-14 ENCOUNTER — MYC MEDICAL ADVICE (OUTPATIENT)
Dept: INTERNAL MEDICINE | Facility: CLINIC | Age: 73
End: 2023-11-14

## 2023-11-14 DIAGNOSIS — L43.9 LICHEN PLANUS: ICD-10-CM

## 2023-11-14 DIAGNOSIS — M81.0 SENILE OSTEOPOROSIS: Primary | ICD-10-CM

## 2023-11-14 DIAGNOSIS — D49.2 NEOPLASM OF SKIN: Primary | ICD-10-CM

## 2023-11-14 PROCEDURE — 99212 OFFICE O/P EST SF 10 MIN: CPT | Performed by: DERMATOLOGY
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
## 2023-11-14 NOTE — PATIENT INSTRUCTIONS
Patient Education       Proper skin care from [facility]:    -Eliminate harsh soaps as they strip the natural oils from the skin, often resulting in dry itchy skin ( i.e. Dial, Zest, Tamazight Spring)  -Use mild soaps such as Cetaphil or Dove Sensitive Skin in the shower. You do not need to use soap on arms, legs, and trunk every time you shower unless visibly soiled.   -Avoid hot or cold showers.  -After showering, lightly dry off and apply moisturizing within 2-3 minutes. This will help trap moisture in the skin.   -Aggressive use of a moisturizer at least 1-2 times a day to the entire body (including -Vanicream, Cetaphil, Aquaphor or Cerave) and moisturize hands after every washing.  -We recommend using moisturizers that come in a tub that needs to be scooped out, not a pump. This has more of an oil base. It will hold moisture in your skin much better than a water base moisturizer. The above recommended are non-pore clogging.      Wear a sunscreen with at least SPF 30 on your face, ears, neck and V of the chest daily. Wear sunscreen on other areas of the body if those areas are exposed to the sun throughout the day. Sunscreens can contain physical and/or chemical blockers. Physical blockers are less likely to clog pores, these include zinc oxide and titanium dioxide. Reapply every two hour and after swimming.     Sunscreen examples: https://www.ewg.org/sunscreen/    UV radiation  UVA radiation remains constant throughout the day and throughout the year. It is a longer wavelength than UVB and therefore penetrates deeper into the skin leading to immediate and delayed tanning, photoaging, and skin cancer. 70-80% of UVA and UVB radiation occurs between the hours of 10am-2pm.  UVB radiation  UVB radiation causes the most harmful effects and is more significant during the summer months. However, snow and ice can reflect UVB radiation leading to skin damage during the winter months as well. UVB radiation is  responsible for tanning, burning, inflammation, delayed erythema (pinkness), pigmentation (brown spots), and skin cancer.     I recommend self monthly full body exams and yearly full body exams with a dermatology provider. If you develop a new or changing lesion please follow up for examination. Most skin cancers are pink and scaly or pink and pearly. However, we do see blue/brown/black skin cancers.  Consider the ABCDEs of melanoma when giving yourself your monthly full body exam ( don't forget the groin, buttocks, feet, toes, etc). A-asymmetry, B-borders, C-color, D-diameter, E-elevation or evolving. If you see any of these changes please follow up in clinic. If you cannot see your back I recommend purchasing a hand held mirror to use with a larger wall mirror.       Checking for Skin Cancer  You can find cancer early by checking your skin each month. There are 3 kinds of skin cancer. They are melanoma, basal cell carcinoma, and squamous cell carcinoma. Doing monthly skin checks is the best way to find new marks or skin changes. Follow the instructions below for checking your skin.   The ABCDEs of checking moles for melanoma   Check your moles or growths for signs of melanoma using ABCDE:   Asymmetry: the sides of the mole or growth don t match  Border: the edges are ragged, notched, or blurred  Color: the color within the mole or growth varies  Diameter: the mole or growth is larger than 6 mm (size of a pencil eraser)  Evolving: the size, shape, or color of the mole or growth is changing (evolving is not shown in the images below)    Checking for other types of skin cancer  Basal cell carcinoma or squamous cell carcinoma have symptoms such as:     A spot or mole that looks different from all other marks on your skin  Changes in how an area feels, such as itching, tenderness, or pain  Changes in the skin's surface, such as oozing, bleeding, or scaliness  A sore that does not heal  New swelling or redness beyond  the border of a mole    Who s at risk?  Anyone can get skin cancer. But you are at greater risk if you have:   Fair skin, light-colored hair, or light-colored eyes  Many moles or abnormal moles on your skin  A history of sunburns from sunlight or tanning beds  A family history of skin cancer  A history of exposure to radiation or chemicals  A weakened immune system  If you have had skin cancer in the past, you are at risk for recurring skin cancer.   How to check your skin  Do your monthly skin checkups in front of a full-length mirror. Check all parts of your body, including your:   Head (ears, face, neck, and scalp)  Torso (front, back, and sides)  Arms (tops, undersides, upper, and lower armpits)  Hands (palms, backs, and fingers, including under the nails)  Buttocks and genitals  Legs (front, back, and sides)  Feet (tops, soles, toes, including under the nails, and between toes)  If you have a lot of moles, take digital photos of them each month. Make sure to take photos both up close and from a distance. These can help you see if any moles change over time.   Most skin changes are not cancer. But if you see any changes in your skin, call your doctor right away. Only he or she can diagnose a problem. If you have skin cancer, seeing your doctor can be the first step toward getting the treatment that could save your life.   [employer] last reviewed this educational content on 4/1/2019 2000-2020 [employer]. [address] [place] [address], [place] [address]. All rights reserved. This information is not intended as a substitute for professional medical care. Always follow your healthcare professional's instructions.       When should I call my doctor?  If you are worsening or not improving, please, contact us or seek urgent care as noted below.     Who should I call with questions (adults)?  [facility] (adult and pediatric): [phone]  [facility]  [place] (adult): [phone]  [facility] ([place], [place], [place] and [place]) [phone]  For urgent needs outside of business hours call the [facility] at [phone] and ask for the dermatology resident on call to be paged  If this is a medical emergency and you are unable to reach an ER, Call 911      If you need a prescription refill, please contact your pharmacy. Refills are approved or denied by our Physicians during normal business hours, Monday through Fridays  Per office policy, refills will not be granted if you have not been seen within the past year (or sooner depending on your child's condition)
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
## 2023-11-15 RX ORDER — FLUOCINONIDE GEL 0.5 MG/G
GEL TOPICAL 2 TIMES DAILY
Qty: 15 G | Refills: 3 | Status: SHIPPED | OUTPATIENT
Start: 2023-11-15

## 2023-11-24 NOTE — PROGRESS NOTES
Patient arrived on 2A for RHC with exercise.  LMX to right neck.  Awaiting consent, otherwise prep complerte.   PROGRESS NOTE:   Authored by Dr. Michelle Bravo MD (PGY-3). Pager [facility] [phone]/ [id number]    Patient is a 82y old  Male who presents with a chief complaint of acute hypoxic respiratory failure (23 Nov 2023 09:55)      SUBJECTIVE / OVERNIGHT EVENTS:  No acute events overnight.     MEDICATIONS  (STANDING):  aspirin enteric coated 81 milliGRAM(s) Oral daily  atorvastatin 80 milliGRAM(s) Oral at bedtime  chlorhexidine 2% Cloths 1 Application(s) Topical daily  clopidogrel Tablet 75 milliGRAM(s) Oral daily  gabapentin 300 milliGRAM(s) Oral daily  metoprolol succinate  milliGRAM(s) Oral daily  pantoprazole    Tablet 40 milliGRAM(s) Oral before breakfast  sertraline 25 milliGRAM(s) Oral daily    MEDICATIONS  (PRN):  aluminum hydroxide/magnesium hydroxide/simethicone Suspension 30 milliLiter(s) Oral every 6 hours PRN Dyspepsia  cyclobenzaprine 5 milliGRAM(s) Oral three times a day PRN Muscle Spasm      CAPILLARY BLOOD GLUCOSE        I&O's Summary    23 Nov 2023 07:01  -  24 Nov 2023 07:00  --------------------------------------------------------  IN: 680 mL / OUT: 1600 mL / NET: -920 mL        PHYSICAL EXAM:  Vital Signs Last 24 Hrs  T(C): 36.8 (24 Nov 2023 06:30), Max: 36.8 (24 Nov 2023 06:30)  T(F): 98.2 (24 Nov 2023 06:30), Max: 98.2 (24 Nov 2023 06:30)  HR: 70 (24 Nov 2023 06:30) (65 - 87)  BP: 136/71 (24 Nov 2023 06:30) (122/69 - 150/81)  BP(mean): --  RR: 18 (24 Nov 2023 06:30) (18 - 18)  SpO2: 95% (24 Nov 2023 06:30) (92% - 99%)    Parameters below as of 24 Nov 2023 06:30  Patient On (Oxygen Delivery Method): nasal cannula  O2 Flow (L/min): 2      CONSTITUTIONAL: NAD  HEENT: MMM, EOMI, PERRLA  NECK: supple  RESPIRATORY: Normal respiratory effort; lungs are clear to auscultation bilaterally  CARDIOVASCULAR: Regular rate and rhythm, normal S1 and S2, no murmur/rub/gallop; No lower extremity edema  ABDOMEN: Nontender to palpation, normoactive bowel sounds, no rebound/guarding; No hepatosplenomegaly  MUSCULOSKELETAL: no clubbing or cyanosis of digits; no joint swelling or tenderness to palpation  NEURO: alert and oriented to person, place, and time. Moving all four extremities, sensation grossly intact  PSYCH: alert and oriented to person, place, and time; affect appropriate  SKIN: No rash    LABS:                        8.2    1.02  )-----------( 146      ( 24 Nov 2023 07:02 )             24.0     11-23    138  |  102  |  46<H>  ----------------------------<  175<H>  4.1   |  23  |  3.76<H>    Ca    8.5      23 Nov 2023 09:16  Phos  4.7     11-23  Mg     2.4     11-23    TPro  6.4  /  Alb  3.7  /  TBili  0.4  /  DBili  x   /  AST  17  /  ALT  9<L>  /  AlkPhos  92  11-23    PT/INR - ( 23 Nov 2023 09:15 )   PT: 12.8 sec;   INR: 1.17 ratio         PTT - ( 23 Nov 2023 09:15 )  PTT:27.2 sec      Urinalysis Basic - ( 23 Nov 2023 09:16 )    Color: x / Appearance: x / SG: x / pH: x  Gluc: 175 mg/dL / Ketone: x  / Bili: x / Urobili: x   Blood: x / Protein: x / Nitrite: x   Leuk Esterase: x / RBC: x / WBC x   Sq Epi: x / Non Sq Epi: x / Bacteria: x          Tele Reviewed:    RADIOLOGY & ADDITIONAL TESTS:  Results Reviewed:   Imaging Personally Reviewed:  Electrocardiogram Personally Reviewed:

## 2024-01-15 ENCOUNTER — TELEPHONE (OUTPATIENT)
Dept: INTERNAL MEDICINE | Facility: CLINIC | Age: 74
End: 2024-01-15
Payer: COMMERCIAL

## 2024-02-16 ENCOUNTER — OFFICE VISIT (OUTPATIENT)
Dept: INTERNAL MEDICINE | Facility: CLINIC | Age: 74
End: 2024-02-16
Payer: COMMERCIAL

## 2024-02-16 VITALS
DIASTOLIC BLOOD PRESSURE: 83 MMHG | HEART RATE: 70 BPM | WEIGHT: 124.3 LBS | SYSTOLIC BLOOD PRESSURE: 136 MMHG | HEIGHT: 67 IN | OXYGEN SATURATION: 100 % | BODY MASS INDEX: 19.51 KG/M2

## 2024-02-16 DIAGNOSIS — R10.9 ABDOMINAL WALL PAIN: Primary | ICD-10-CM

## 2024-02-16 PROCEDURE — 99214 OFFICE O/P EST MOD 30 MIN: CPT | Performed by: INTERNAL MEDICINE

## 2024-02-19 NOTE — PROGRESS NOTES
"Magdiel is here to follow up two issues:     Her smart watch has been alerting that she might be in a fib.  Magdiel did have a workup last summer for palpitations, and her ziopatch showed short runs of SVT.  I believe that the watch is probably picking up this same phenomenon. I explained that a fib and SVT are managed differently (jorge luis regarding anticoagulation, and rate control, rhythm control) and the only potential medication to consider would be a beta blocker to reduce symptoms.  She's not terribly symptomatic, and as such declines medication intervention right now.  She is experiencing lower abdominal pain that is very localized (she can point to it with one finger) and it is mostly exacerbated by pressing on it or certain movements.  No dysuria, no diarrhea or other GI symptoms.  She points out that she is getting ready to move and has been packing, lifting heavy items, etc.    On physical exam  /83 (BP Location: Left arm, Patient Position: Sitting, Cuff Size: Adult Regular)   Pulse 70   Ht 1.689 m (5' 6.5\")   Wt 56.4 kg (124 lb 4.8 oz)   SpO2 100%   BMI 19.76 kg/m     Cor RRR, no m/r/g  Abd +BS, S, NT, ND.  There is point tenderness over the right inguinal ligament versus the right lower rectus abdominus sheath.    A/P:  73 year old with a past medical history of Age-related osteoporosis without current pathological fracture (02/22/2019), Benign positional vertigo (decades), Bilateral hearing loss, unspecified hearing loss type (12/06/2018), CARDIOVASCULAR SCREENING; LDL GOAL LESS THAN 160 (07/17/2013), Cerebral aneurysm (12/2017), Erythema multiforme (09/26/2016), GERD (gastroesophageal reflux disease), Hearing problem ('04 & \"18), Hematuria (09/26/2016), Laryngospasm (09/26/2016), Lichen planus, Migraine, Migraines (2000), Ocular migraine (09/26/2016), Oral lichen planus (09/26/2016), Osteopenia (2010), Osteopenia of left hip (12/12/2018), Physical exam (09/26/2016), Pulmonary hypertension (H), Right " internal carotid artery aneurysm (12/06/2018), Sensorineural hearing loss (2/04 & 4/18), Tinnitus (12/2018), and White coat syndrome without diagnosis of hypertension (12/06/2018).  Here for two concerns.     SVT:  monitor for now, start metoprolol if symptoms increase  Abdominal pain:  this actually seems more like abdominal wall pain.  I believe the recent physical effort of packing and lifting to move might have produced a strain of either the inguinal ligament or the rectus abdominal sheath.  There is no bulge there to consider hernia, but if symptoms worsen, I would consider it.  Reassurance was given; she will reach out if it does not resolve.  I mentioned to her that I do not think it is GI or  pathology.    Kelley Hernandez MD

## 2024-02-22 ENCOUNTER — MYC MEDICAL ADVICE (OUTPATIENT)
Dept: FAMILY MEDICINE | Facility: CLINIC | Age: 74
End: 2024-02-22
Payer: COMMERCIAL

## 2024-02-22 DIAGNOSIS — Z92.29 HX DRUG THERAPY: ICD-10-CM

## 2024-02-22 DIAGNOSIS — M81.0 SENILE OSTEOPOROSIS: Primary | ICD-10-CM

## 2024-02-28 ENCOUNTER — TELEPHONE (OUTPATIENT)
Dept: ENDOCRINOLOGY | Facility: CLINIC | Age: 74
End: 2024-02-28
Payer: COMMERCIAL

## 2024-02-28 NOTE — TELEPHONE ENCOUNTER
Patient call:     Appointment type: return endocrine   Provider: Magdalena    Return date: r/s 5/17 appt   Speciality phone number: [phone]  Additional appointment(s) needed:   Additional notes: LVM, sent myc, and letter x1     Time on hold on 3/8 and 4/12 to r/s, also ok to use taty to r/s if 3/8 or 4/12 do not work for pt   Please note that the above appointment(s) will require manual scheduling as they are marked as TATY and will not appear using auto search. Do not schedule the patient if another patient has already been scheduled in the requested appointment slot.     Pierre Giron on 2/28/2024 at 12:01 PM

## 2024-03-14 ENCOUNTER — LAB (OUTPATIENT)
Dept: LAB | Facility: CLINIC | Age: 74
End: 2024-03-14
Payer: COMMERCIAL

## 2024-03-14 DIAGNOSIS — M81.0 SENILE OSTEOPOROSIS: ICD-10-CM

## 2024-03-14 DIAGNOSIS — Z92.29 HX DRUG THERAPY: ICD-10-CM

## 2024-03-14 LAB
ANION GAP SERPL CALCULATED.3IONS-SCNC: 7 MMOL/L (ref 7–15)
BUN SERPL-MCNC: 20.7 MG/DL (ref 8–23)
CALCIUM SERPL-MCNC: 9.5 MG/DL (ref 8.8–10.2)
CHLORIDE SERPL-SCNC: 100 MMOL/L (ref 98–107)
CREAT SERPL-MCNC: 0.79 MG/DL (ref 0.51–0.95)
DEPRECATED HCO3 PLAS-SCNC: 28 MMOL/L (ref 22–29)
EGFRCR SERPLBLD CKD-EPI 2021: 79 ML/MIN/1.73M2
GLUCOSE SERPL-MCNC: 83 MG/DL (ref 70–99)
POTASSIUM SERPL-SCNC: 4.6 MMOL/L (ref 3.4–5.3)
SODIUM SERPL-SCNC: 135 MMOL/L (ref 135–145)

## 2024-03-14 PROCEDURE — 80048 BASIC METABOLIC PNL TOTAL CA: CPT

## 2024-03-14 PROCEDURE — 36415 COLL VENOUS BLD VENIPUNCTURE: CPT

## 2024-03-15 NOTE — RESULT ENCOUNTER NOTE
Your kidney, blood sugar,calcium, sodium and potassium were normal or within acceptable range.   Isidoro Coronel MD covering for Dr Hightower.

## 2024-03-20 ENCOUNTER — TELEPHONE (OUTPATIENT)
Dept: INTERNAL MEDICINE | Facility: CLINIC | Age: 74
End: 2024-03-20
Payer: COMMERCIAL

## 2024-03-27 ENCOUNTER — ALLIED HEALTH/NURSE VISIT (OUTPATIENT)
Dept: INTERNAL MEDICINE | Facility: CLINIC | Age: 74
End: 2024-03-27
Payer: COMMERCIAL

## 2024-03-27 DIAGNOSIS — Z92.29 PERSONAL HISTORY OF OTHER DRUG THERAPY: ICD-10-CM

## 2024-03-27 DIAGNOSIS — M81.0 SENILE OSTEOPOROSIS: Primary | ICD-10-CM

## 2024-03-27 PROCEDURE — 96372 THER/PROPH/DIAG INJ SC/IM: CPT

## 2024-03-27 PROCEDURE — 99207 PR NO CHARGE NURSE ONLY: CPT

## 2024-03-27 NOTE — PATIENT INSTRUCTIONS
"Prolia     It was a pleasure seeing you in the clinic today. You received an injection of Prolia on 3/27/2024. Prolia injections are administered every 6 months.     Next steps:         Your next anticipated Prolia injection is 9/27/2024    On or around 8/27/2024, please call [phone] to schedule your next Prolia injection. Please notify the Nurse that you will need to complete \"Pre-Prolia\" injection lab work, which includes a Basic Metabolic Panel.        "

## 2024-03-27 NOTE — PROGRESS NOTES
Sarah العلي received a Prolia injection today in clinic at the request of Dr Hightower. The medication was given under the supervision of Dr Ding if assistance was needed. The patient does not report a history of adverse reactions associated with medication/injection administration. The injection site was cleaned with an alcohol prep wipe. The medication was given without incident--see MAR and the information above for more administration details. No swelling or redness was observed at the site of injection after the injection was given. The patient was advised to remain in fourth floor lobby of the [facility] for fifteen minutes after the injection in case of an averse reaction.     Prolia injections are administered every 6 months. The patient's next anticipated Prolia injection is 9/27/2024. The patient was provided with an injection schedule and was instructed to call the Primary Care Scheduling Number to schedule their next Prolia injection. They were also instructed to notify the Nurse that they will need to have orders for a Basic Metabolic Panel placed, and that their lab work must be completed no more than 30 days before their next Prolia injection.    Brendon Cristobal EMT at 9:09 AM on 3/27/2024    Clinic Administered Medication Documentation      Prolia Documentation    Indication: Prolia  (denosumab) is a prescription medicine used to treat osteoporosis in patients who:   Are at high risk for fracture, meaning patients who have had a fracture related to osteoporosis, or who have multiple risk factors for fracture.  Cannot use another osteoporosis medicine or other osteoporosis medicines did not work well.  The timeline for early/late injections would be 4 weeks early and any time after the 6 month charleen. If a patient receives their injection late, then the subsequent injection would be 6 months from the date that they actually received the injection.    When was the last injection?   2023  Was the last injection at least 6 months ago? Yes  Has the prior authorization been completed?  Yes  Is there an active order (written within the past 365 days, with administrations remaining, not ) in the chart?  Yes  Patient denies any dental work involving the bone (e.g. tooth extraction or dental implants) in the past 4 weeks?  Yes  Patient denies plans for any dental work involving the bone (e.g. tooth extraction or dental implants) in the next 4 weeks? Yes    The following steps were completed to comply with the REMS program for Prolia:  Confirms that patient received education from RN or provider via the Medication Guide and Patient Counseling Chart, including the serious risks of Prolia  and the symptoms of each risk.  Told the patient to seek prompt medical attention if they have signs or symptoms of any of the serious risks, as described in the Medication Guide and Patient Counseling Chart that was reviewed between the patient and RN or LP.  Provided each patient a copy of the Medication Guide and Patient Brochure.    Prior to injection, verified patient identity using patient's name and date of birth. Medication was administered. Please see MAR and medication order for additional information. Patient instructed to remain in clinic for 15 minutes and report any adverse reaction to staff immediately.    Vial/Syringe: Syringe  Was this medication supplied by the patient? No  Patient has no refills remaining. Refill encounter opened, order pended, Routed to the provider, and Provider orders prior to nurse visit

## 2024-04-08 ENCOUNTER — MYC MEDICAL ADVICE (OUTPATIENT)
Dept: RESEARCH | Facility: CLINIC | Age: 74
End: 2024-04-08
Payer: COMMERCIAL

## 2024-04-10 ENCOUNTER — LAB (OUTPATIENT)
Dept: LAB | Facility: CLINIC | Age: 74
End: 2024-04-10
Payer: COMMERCIAL

## 2024-04-10 DIAGNOSIS — M81.0 SENILE OSTEOPOROSIS: ICD-10-CM

## 2024-04-10 LAB
CALCIUM SERPL-MCNC: 9.4 MG/DL (ref 8.8–10.2)
MAGNESIUM SERPL-MCNC: 2.1 MG/DL (ref 1.7–2.3)
PHOSPHATE SERPL-MCNC: 3.4 MG/DL (ref 2.5–4.5)

## 2024-04-10 PROCEDURE — 36415 COLL VENOUS BLD VENIPUNCTURE: CPT

## 2024-04-10 PROCEDURE — 83735 ASSAY OF MAGNESIUM: CPT

## 2024-04-10 PROCEDURE — 82310 ASSAY OF CALCIUM: CPT

## 2024-04-10 PROCEDURE — 84100 ASSAY OF PHOSPHORUS: CPT

## 2024-04-16 ENCOUNTER — TELEPHONE (OUTPATIENT)
Dept: CARDIOLOGY | Facility: CLINIC | Age: 74
End: 2024-04-16
Payer: COMMERCIAL

## 2024-04-16 NOTE — TELEPHONE ENCOUNTER
4/16 Patient confirmed scheduled appointment:  Date: 6/13/2024  Time: 10:30 am  Visit type: Return Heart Failure  Provider: Negrita  Location: [facility]  Testing/imaging: Labs and echo prior   Additional notes: n/a

## 2024-05-07 ENCOUNTER — TELEPHONE (OUTPATIENT)
Dept: ENDOCRINOLOGY | Facility: CLINIC | Age: 74
End: 2024-05-07
Payer: COMMERCIAL

## 2024-05-07 NOTE — TELEPHONE ENCOUNTER
Patient confirmed scheduled appointment:  Date: 7/22   Time: 4 pm   Visit type: return endocrine   Provider: Magdalena   Location: [facility]  Testing/imaging: NA   Additional notes: Spoke to pt and offered soonest avail appt due to this being the 2nd re-schedule with multiple cancellations for various reason. Dolores ARGUELLO for pts with these circumstances. Re-dileep 8/30 appt due to change in the providers schedule.    Cristela Quijano on 5/7/2024 at 2:48 PM

## 2024-06-05 DIAGNOSIS — I50.30 HEART FAILURE WITH PRESERVED EJECTION FRACTION, UNSPECIFIED HF CHRONICITY (H): Primary | ICD-10-CM

## 2024-06-13 ENCOUNTER — LAB (OUTPATIENT)
Dept: LAB | Facility: CLINIC | Age: 74
End: 2024-06-13
Attending: INTERNAL MEDICINE
Payer: COMMERCIAL

## 2024-06-13 ENCOUNTER — ANCILLARY PROCEDURE (OUTPATIENT)
Dept: CARDIOLOGY | Facility: CLINIC | Age: 74
End: 2024-06-13
Attending: INTERNAL MEDICINE
Payer: COMMERCIAL

## 2024-06-13 VITALS
BODY MASS INDEX: 19.11 KG/M2 | HEART RATE: 60 BPM | WEIGHT: 120.2 LBS | DIASTOLIC BLOOD PRESSURE: 77 MMHG | OXYGEN SATURATION: 100 % | SYSTOLIC BLOOD PRESSURE: 130 MMHG

## 2024-06-13 DIAGNOSIS — I50.30 HEART FAILURE WITH PRESERVED EJECTION FRACTION, UNSPECIFIED HF CHRONICITY (H): ICD-10-CM

## 2024-06-13 LAB
ANION GAP SERPL CALCULATED.3IONS-SCNC: 9 MMOL/L (ref 7–15)
BUN SERPL-MCNC: 16.9 MG/DL (ref 8–23)
CALCIUM SERPL-MCNC: 9.6 MG/DL (ref 8.8–10.2)
CHLORIDE SERPL-SCNC: 99 MMOL/L (ref 98–107)
CREAT SERPL-MCNC: 0.81 MG/DL (ref 0.51–0.95)
DEPRECATED HCO3 PLAS-SCNC: 25 MMOL/L (ref 22–29)
EGFRCR SERPLBLD CKD-EPI 2021: 76 ML/MIN/1.73M2
ERYTHROCYTE [DISTWIDTH] IN BLOOD BY AUTOMATED COUNT: 12.8 % (ref 10–15)
GLUCOSE SERPL-MCNC: 103 MG/DL (ref 70–99)
HCT VFR BLD AUTO: 41.2 % (ref 35–47)
HGB BLD-MCNC: 13.5 G/DL (ref 11.7–15.7)
LVEF ECHO: NORMAL
MCH RBC QN AUTO: 30.2 PG (ref 26.5–33)
MCHC RBC AUTO-ENTMCNC: 32.8 G/DL (ref 31.5–36.5)
MCV RBC AUTO: 92 FL (ref 78–100)
NT-PROBNP SERPL-MCNC: 284 PG/ML (ref 0–900)
PLATELET # BLD AUTO: 274 10E3/UL (ref 150–450)
POTASSIUM SERPL-SCNC: 4.3 MMOL/L (ref 3.4–5.3)
RBC # BLD AUTO: 4.47 10E6/UL (ref 3.8–5.2)
SODIUM SERPL-SCNC: 133 MMOL/L (ref 135–145)
WBC # BLD AUTO: 5.2 10E3/UL (ref 4–11)

## 2024-06-13 PROCEDURE — G0463 HOSPITAL OUTPT CLINIC VISIT: HCPCS | Performed by: INTERNAL MEDICINE

## 2024-06-13 PROCEDURE — 83880 ASSAY OF NATRIURETIC PEPTIDE: CPT | Performed by: PATHOLOGY

## 2024-06-13 PROCEDURE — 85027 COMPLETE CBC AUTOMATED: CPT | Performed by: PATHOLOGY

## 2024-06-13 PROCEDURE — 80048 BASIC METABOLIC PNL TOTAL CA: CPT | Performed by: PATHOLOGY

## 2024-06-13 PROCEDURE — 36415 COLL VENOUS BLD VENIPUNCTURE: CPT | Performed by: PATHOLOGY

## 2024-06-13 PROCEDURE — 93306 TTE W/DOPPLER COMPLETE: CPT | Performed by: INTERNAL MEDICINE

## 2024-06-13 PROCEDURE — 99215 OFFICE O/P EST HI 40 MIN: CPT | Performed by: INTERNAL MEDICINE

## 2024-06-13 ASSESSMENT — PAIN SCALES - GENERAL: PAINLEVEL: NO PAIN (0)

## 2024-06-13 NOTE — NURSING NOTE
Chief Complaint   Patient presents with    Follow Up     RTN HF: 73 year old female presents with exercise induced HFpEF for 1 year follow-up with labs and echo prior     Vitals were taken and medications reconciled.    Bimal Sahu, EMT  10:15 AM

## 2024-06-13 NOTE — PROGRESS NOTES
"June 13, 2024    Dear Dr. Queen,    We had the pleasure of seeing Ms. Sarah العلي for follow-up in her pulmonary hypertension clinic today.  As you know she is a 73-year-old female with Raynaud's disease, family history of scleroderma, HFpEF, and PAC's who returns today for follow-up.    She is doing overall well.  She and her  are walking 30 minutes a day.  She has not noticed any decrease in her exercise capacity.  No exertional chest pain or chest pressure.  No exertional presyncope or syncope.  No lower extremity swelling or abdominal distention.  No recent hospitalizations or ER visits.      PMH:  Past Medical History:   Diagnosis Date    Age-related osteoporosis without current pathological fracture 02/22/2019    Benign positional vertigo decades    Bilateral hearing loss, unspecified hearing loss type 12/06/2018    CARDIOVASCULAR SCREENING; LDL GOAL LESS THAN 160 07/17/2013    Cerebral aneurysm 12/2017    Erythema multiforme 09/26/2016    GERD (gastroesophageal reflux disease)     Hearing problem '04 & \"18    SNHL mild L ear;moderate-severe R ear  >75% loss word compre    Hematuria 09/26/2016    Overview:  Has had urologic evaluation    Laryngospasm 09/26/2016    Lichen planus     Migraine     with auora    Migraines 2000    Ocular migraine/ Migraine with Aura    Ocular migraine 09/26/2016    Oral lichen planus 09/26/2016    Osteopenia 2010    Osteopenia of left hip 12/12/2018    Physical exam 09/26/2016    Overview:  Full code.  Would want her  and son to make medical decisions for her if she were unable to do so.  Does not have an advanced directive.     Overview:  Diet - tries to eat a healthy diet  Exercise - \"I'm a fair weather walker\"  Active during the day Mammogram - 10/2017  Colonoscopy - 2/2008 - next in 2018  DXA - 8/2015 - next in 2018  Immunizations -  Up to date     Pulmonary hypertension (H)     Right internal carotid artery aneurysm 12/06/2018    5 mm    Sensorineural hearing " loss 2/04 & 4/18 2/18 worsened    Tinnitus 12/2018    R ear only    White coat syndrome without diagnosis of hypertension 12/06/2018     Current Medications    Current Outpatient Medications   Medication Sig Dispense Refill    albuterol (PROAIR HFA/PROVENTIL HFA/VENTOLIN HFA) 108 (90 Base) MCG/ACT inhaler Inhale 2 puffs into the lungs every 6 hours as needed for shortness of breath, wheezing or cough 18 g 1    Cholecalciferol (VITAMIN D3) 1000 UNITS CAPS Take 1 capsule by mouth daily      fluocinonide (LIDEX) 0.05 % external gel Apply topically 2 times daily 15 g 3     Current Facility-Administered Medications   Medication Dose Route Frequency Provider Last Rate Last Admin    denosumab (PROLIA) injection 60 mg  60 mg Subcutaneous Once Evelyn Hightower MD PhD         Exam:  /77 (BP Location: Left arm, Patient Position: Chair, Cuff Size: Adult Regular)   Pulse 60   Wt 54.5 kg (120 lb 3.2 oz)   SpO2 100%   BMI 19.11 kg/m    She was awake, alert, oriented x3.  She was comfortable.  She was in no apparent distress.  She had no pallor, cyanosis or jaundice.  Her neck exam revealed no jugular venous distention.  She had no lower extremity edema.  Cardiac auscultation revealed normal S1 and S2 with no murmur rub or gallop.  Auscultation of the lungs revealed equal air entry on both sides with no added sound.  Her abdomen was soft with normal also no tenderness no rigidity no guarding.  She had no focal neurological deficit.  Her extremities showed no edema.    Recent Results (from the past 168 hour(s))   Basic metabolic panel    Collection Time: 06/13/24  9:12 AM   Result Value Ref Range    Sodium 133 (L) 135 - 145 mmol/L    Potassium 4.3 3.4 - 5.3 mmol/L    Chloride 99 98 - 107 mmol/L    Carbon Dioxide (CO2) 25 22 - 29 mmol/L    Anion Gap 9 7 - 15 mmol/L    Urea Nitrogen 16.9 8.0 - 23.0 mg/dL    Creatinine 0.81 0.51 - 0.95 mg/dL    GFR Estimate 76 >60 mL/min/1.73m2    Calcium 9.6 8.8 - 10.2 mg/dL     Glucose 103 (H) 70 - 99 mg/dL   CBC with platelets    Collection Time: 06/13/24  9:12 AM   Result Value Ref Range    WBC Count 5.2 4.0 - 11.0 10e3/uL    RBC Count 4.47 3.80 - 5.20 10e6/uL    Hemoglobin 13.5 11.7 - 15.7 g/dL    Hematocrit 41.2 35.0 - 47.0 %    MCV 92 78 - 100 fL    MCH 30.2 26.5 - 33.0 pg    MCHC 32.8 31.5 - 36.5 g/dL    RDW 12.8 10.0 - 15.0 %    Platelet Count 274 150 - 450 10e3/uL     Echocardiogram (06/2024)  Left ventricular size, wall motion and function are normal. The ejection fraction is 60-65%.  Right ventricular function, chamber size, wall motion, and thickness are normal.  Mild to moderate aortic insufficiency.  The inferior vena cava was normal in size with preserved respiratory variability.   This study was compared with the study from 9/12/2022. No significant changes noted.    CPEX (06/2024)  She exercised for 8 minutes and 11 seconds.  Her estimated METS was 5.7 METS.  She achieved anaerobic threshold with an RER of 1.09.  Her VO2 max was 19.8 mL/kg/min.   Her VO2 was normal at 87% predicted.  Her VE VCO2 slope was normal at 26.  She had appropriate blood pressure and heart rate response.  Her breathing reserve was normal at peak exercise.    Exercise RHC (11/2021)    She had exercise right heart catheterization with the following hemodynamics.    Baseline:  RA: 7/10/7  RV: 35/8  PCWP: 13/20/13  PA: 35/15/24  PA Sat: 77.2%  Hb: 12.4  HR: 72  Ao Sat: 100%  Basilio CO/CI: 4.9/2.9  TD CO/CI: 4.1/2.5  VO2: 187  PVR 2.2      Exercise (4 min 50 seconds):  RA: 8/14/8  PA: 54/35/42  PCWP: 35/51/35  PA Sat: 39.3%  Hb: 12.3  HR: 138  Ao Sat: 97%  Basilio CO/CI: 8.5/5.1  VO2: 821  PVR 0.8 CARLOS    Assessment and Plan:  In summary Ms. العلي is a very pleasant 73-year-old female with Raynaud's disease, a family history of scleroderma, and  exercise  induced heart failure with preserved ejection fraction who returns today for follow-up.     Exercise-induced heart failure with preserved ejection fraction      She is stable.  She is functional class II.  She has no evidence of decompensated heart failure.her exercise capacity is preserved as documented by normal VO2 of 86% of age and gender predicted.  Furthermore, her repeat echocardiogram shows normal right ventricular size and systolic function.    We discussed the importance of low-salt diet and fluid restriction.  We also discussed the importance of regular exercise. She didn't tolerate SGLT2-Inhibitors in the past.    Supraventricular arrhythmia -predominantly PACs    We discussed the potential option of starting beta-blockers however given the concern of fatigue she would like to hold off which I think is very reasonable.  Her SVT burden is very low.  She has symptoms mainly at nighttime.  We will continue to monitor this closely.  She is also wearing a digital watch to monitor for atrial fibrillation.    Pulmonary nodules  She follows up with the pulmonary nodule clinic at the [facility].    I have recommended her to return to clinic in a year with a repeat echocardiogram, cardiopulmonary stress testing, and labs.  She will call us in the interim with any further worsening symptoms. It was a pleasure seeing Ms. العلي in our bone hypertension clinic at [facility].    Total time today was  minutes reviewing notes, imaging, labs, patient visit, orders and documentation         Sincerely,  Negrita Rees MD   [facility] for [facility]pertension  Heart Failure, Transplant, and Mechanical Circulatory Support Cardiology   Cardiovascular Division  [facility]   [phone]

## 2024-06-13 NOTE — LETTER
"6/13/2024      RE: Sarah العلي  [address] [place] [address]       Dear Colleague,    Thank you for the opportunity to participate in the care of your patient, Sarah العلي, at the [facility] [place] [facility]. Please see a copy of my visit note below.    June 13, 2024    Dear Dr. Queen,    We had the pleasure of seeing Ms. Sarah العلي for follow-up in her pulmonary hypertension clinic today.  As you know she is a 73-year-old female with Raynaud's disease, family history of scleroderma, HFpEF, and PAC's who returns today for follow-up.    She is doing overall well.  She and her  are walking 30 minutes a day.  She has not noticed any decrease in her exercise capacity.  No exertional chest pain or chest pressure.  No exertional presyncope or syncope.  No lower extremity swelling or abdominal distention.  No recent hospitalizations or ER visits.      PMH:  Past Medical History:   Diagnosis Date    Age-related osteoporosis without current pathological fracture 02/22/2019    Benign positional vertigo decades    Bilateral hearing loss, unspecified hearing loss type 12/06/2018    CARDIOVASCULAR SCREENING; LDL GOAL LESS THAN 160 07/17/2013    Cerebral aneurysm 12/2017    Erythema multiforme 09/26/2016    GERD (gastroesophageal reflux disease)     Hearing problem '04 & \"18    SNHL mild L ear;moderate-severe R ear  >75% loss word compre    Hematuria 09/26/2016    Overview:  Has had urologic evaluation    Laryngospasm 09/26/2016    Lichen planus     Migraine     with auora    Migraines 2000    Ocular migraine/ Migraine with Aura    Ocular migraine 09/26/2016    Oral lichen planus 09/26/2016    Osteopenia 2010    Osteopenia of left hip 12/12/2018    Physical exam 09/26/2016    Overview:  Full code.  Would want her  and son to make medical decisions for her if she were unable to do so.  Does not have an advanced " "directive.     Overview:  Diet - tries to eat a healthy diet  Exercise - \"I'm a fair weather walker\"  Active during the day Mammogram - 10/2017  Colonoscopy - 2/2008 - next in 2018  DXA - 8/2015 - next in 2018  Immunizations -  Up to date     Pulmonary hypertension (H)     Right internal carotid artery aneurysm 12/06/2018    5 mm    Sensorineural hearing loss 2/04 & 4/18 2/18 worsened    Tinnitus 12/2018    R ear only    White coat syndrome without diagnosis of hypertension 12/06/2018     Current Medications    Current Outpatient Medications   Medication Sig Dispense Refill    albuterol (PROAIR HFA/PROVENTIL HFA/VENTOLIN HFA) 108 (90 Base) MCG/ACT inhaler Inhale 2 puffs into the lungs every 6 hours as needed for shortness of breath, wheezing or cough 18 g 1    Cholecalciferol (VITAMIN D3) 1000 UNITS CAPS Take 1 capsule by mouth daily      fluocinonide (LIDEX) 0.05 % external gel Apply topically 2 times daily 15 g 3     Current Facility-Administered Medications   Medication Dose Route Frequency Provider Last Rate Last Admin    denosumab (PROLIA) injection 60 mg  60 mg Subcutaneous Once Evelyn Hightower MD PhD         Exam:  /77 (BP Location: Left arm, Patient Position: Chair, Cuff Size: Adult Regular)   Pulse 60   Wt 54.5 kg (120 lb 3.2 oz)   SpO2 100%   BMI 19.11 kg/m    She was awake, alert, oriented x3.  She was comfortable.  She was in no apparent distress.  She had no pallor, cyanosis or jaundice.  Her neck exam revealed no jugular venous distention.  She had no lower extremity edema.  Cardiac auscultation revealed normal S1 and S2 with no murmur rub or gallop.  Auscultation of the lungs revealed equal air entry on both sides with no added sound.  Her abdomen was soft with normal also no tenderness no rigidity no guarding.  She had no focal neurological deficit.  Her extremities showed no edema.    Recent Results (from the past 168 hour(s))   Basic metabolic panel    Collection Time: 06/13/24  " 9:12 AM   Result Value Ref Range    Sodium 133 (L) 135 - 145 mmol/L    Potassium 4.3 3.4 - 5.3 mmol/L    Chloride 99 98 - 107 mmol/L    Carbon Dioxide (CO2) 25 22 - 29 mmol/L    Anion Gap 9 7 - 15 mmol/L    Urea Nitrogen 16.9 8.0 - 23.0 mg/dL    Creatinine 0.81 0.51 - 0.95 mg/dL    GFR Estimate 76 >60 mL/min/1.73m2    Calcium 9.6 8.8 - 10.2 mg/dL    Glucose 103 (H) 70 - 99 mg/dL   CBC with platelets    Collection Time: 06/13/24  9:12 AM   Result Value Ref Range    WBC Count 5.2 4.0 - 11.0 10e3/uL    RBC Count 4.47 3.80 - 5.20 10e6/uL    Hemoglobin 13.5 11.7 - 15.7 g/dL    Hematocrit 41.2 35.0 - 47.0 %    MCV 92 78 - 100 fL    MCH 30.2 26.5 - 33.0 pg    MCHC 32.8 31.5 - 36.5 g/dL    RDW 12.8 10.0 - 15.0 %    Platelet Count 274 150 - 450 10e3/uL     Echocardiogram (06/2024)  Left ventricular size, wall motion and function are normal. The ejection fraction is 60-65%.  Right ventricular function, chamber size, wall motion, and thickness are normal.  Mild to moderate aortic insufficiency.  The inferior vena cava was normal in size with preserved respiratory variability.   This study was compared with the study from 9/12/2022. No significant changes noted.    CPEX (06/2024)  She exercised for 8 minutes and 11 seconds.  Her estimated METS was 5.7 METS.  She achieved anaerobic threshold with an RER of 1.09.  Her VO2 max was 19.8 mL/kg/min.   Her VO2 was normal at 87% predicted.  Her VE VCO2 slope was normal at 26.  She had appropriate blood pressure and heart rate response.  Her breathing reserve was normal at peak exercise.    Exercise RHC (11/2021)    She had exercise right heart catheterization with the following hemodynamics.    Baseline:  RA: 7/10/7  RV: 35/8  PCWP: 13/20/13  PA: 35/15/24  PA Sat: 77.2%  Hb: 12.4  HR: 72  Ao Sat: 100%  Basilio CO/CI: 4.9/2.9  TD CO/CI: 4.1/2.5  VO2: 187  PVR 2.2      Exercise (4 min 50 seconds):  RA: 8/14/8  PA: 54/35/42  PCWP: 35/51/35  PA Sat: 39.3%  Hb: 12.3  HR: 138  Ao Sat: 97%  Basilio  CO/CI: 8.5/5.1  VO2: 821  PVR 0.8 CARLOS    Assessment and Plan:  In summary Ms. العلي is a very pleasant 73-year-old female with Raynaud's disease, a family history of scleroderma, and  exercise  induced heart failure with preserved ejection fraction who returns today for follow-up.     Exercise-induced heart failure with preserved ejection fraction     She is stable.  She is functional class II.  She has no evidence of decompensated heart failure.her exercise capacity is preserved as documented by normal VO2 of 86% of age and gender predicted.  Furthermore, her repeat echocardiogram shows normal right ventricular size and systolic function.    We discussed the importance of low-salt diet and fluid restriction.  We also discussed the importance of regular exercise. She didn't tolerate SGLT2-Inhibitors in the past.    Supraventricular arrhythmia -predominantly PACs    We discussed the potential option of starting beta-blockers however given the concern of fatigue she would like to hold off which I think is very reasonable.  Her SVT burden is very low.  She has symptoms mainly at nighttime.  We will continue to monitor this closely.  She is also wearing a digital watch to monitor for atrial fibrillation.    Pulmonary nodules  She follows up with the pulmonary nodule clinic at the [facility].    I have recommended her to return to clinic in a year with a repeat echocardiogram, cardiopulmonary stress testing, and labs.  She will call us in the interim with any further worsening symptoms. It was a pleasure seeing Ms. العلي in our bone hypertension clinic at [facility].    Total time today was  minutes reviewing notes, imaging, labs, patient visit, orders and documentation         Sincerely,  Negrita Rees MD   Center for Pulmonary Hypertension  Heart Failure, Transplant, and Mechanical Circulatory Support Cardiology   Cardiovascular Division  [facility]    [phone]

## 2024-06-13 NOTE — PATIENT INSTRUCTIONS
"You were seen today in the Cardiovascular Clinic at the [facility].       Cardiology Providers you saw during your visit: Dr. Rees      Medication Changes:   1. No changes      Follow up Appointment Information:  1. Cardiopulmonary stress test in the near future  2. Follow up with Dr Rees in 1 year with labs, echocardiogram and cardiopulmonary stress test.      Results:   Latest Reference Range & Units 24 09:12   Sodium 135 - 145 mmol/L 133 (L)   Potassium 3.4 - 5.3 mmol/L 4.3   Chloride 98 - 107 mmol/L 99   Carbon Dioxide (CO2) 22 - 29 mmol/L 25   Urea Nitrogen 8.0 - 23.0 mg/dL 16.9   Creatinine 0.51 - 0.95 mg/dL 0.81   GFR Estimate >60 mL/min/1.73m2 76   Calcium 8.8 - 10.2 mg/dL 9.6   Anion Gap 7 - 15 mmol/L 9   Glucose 70 - 99 mg/dL 103 (H)   N-Terminal Pro Bnp 0 - 900 pg/mL 284   WBC 4.0 - 11.0 10e3/uL 5.2   Hemoglobin 11.7 - 15.7 g/dL 13.5   Hematocrit 35.0 - 47.0 % 41.2   Platelet Count 150 - 450 10e3/uL 274   RBC Count 3.80 - 5.20 10e6/uL 4.47   MCV 78 - 100 fL 92   MCH 26.5 - 33.0 pg 30.2   MCHC 31.5 - 36.5 g/dL 32.8   RDW 10.0 - 15.0 % 12.8   (L): Data is abnormally low  (H): Data is abnormally high        [address] [place] [address]        Thank you for allowing us to be a part of your care here at the [facility]      If you have questions or concerns please contact us at:      Cyndee Fischer RN BSN   Cardiology Care Coordinator  [facility]   Questions and [phone]   press #1 to \"send a message to your care team\"     "

## 2024-06-19 ENCOUNTER — HOSPITAL ENCOUNTER (OUTPATIENT)
Dept: CARDIOLOGY | Facility: CLINIC | Age: 74
Discharge: HOME | End: 2024-06-19
Attending: INTERNAL MEDICINE | Admitting: INTERNAL MEDICINE
Payer: COMMERCIAL

## 2024-06-19 VITALS
HEART RATE: 62 BPM | SYSTOLIC BLOOD PRESSURE: 122 MMHG | WEIGHT: 120.59 LBS | BODY MASS INDEX: 19.38 KG/M2 | HEIGHT: 66 IN | DIASTOLIC BLOOD PRESSURE: 69 MMHG

## 2024-06-19 DIAGNOSIS — I50.30 HEART FAILURE WITH PRESERVED EJECTION FRACTION, UNSPECIFIED HF CHRONICITY (H): ICD-10-CM

## 2024-06-19 PROCEDURE — 94621 CARDIOPULM EXERCISE TESTING: CPT | Mod: 26 | Performed by: INTERNAL MEDICINE

## 2024-06-19 PROCEDURE — 94621 CARDIOPULM EXERCISE TESTING: CPT

## 2024-06-20 LAB
CARDIOPULMONARY BLOOD PRESSURE REST: NORMAL MMHG
CARDIOPULMONARY BREATHING RESERVE REST: 87.8
CARDIOPULMONARY BREATHING RESERVE V02MAX: 48
CARDIOPULMONARY CO2 OUTPUT REST: 187 ML/MIN
CARDIOPULMONARY CO2 OUTPUT VO2MAX: 1254 ML/MIN
CARDIOPULMONARY FEV 1.0 (L) ACTUAL: 2.08
CARDIOPULMONARY FEV 1.0 (L) PRECENT: 86 %
CARDIOPULMONARY FEV 1.0 (L) PREDICTED: 2.42
CARDIOPULMONARY FEV 1.0 FVC (%) ACTUAL: 76.8
CARDIOPULMONARY FEV 1.0 FVC (%) PERCENT: 102 %
CARDIOPULMONARY FEV 1.0 FVC (%) PREDICTED: 75.3
CARDIOPULMONARY FUNCTIONAL CAPACITY MAX ML/KG/MIN: 19.8 ML/KG/MIN
CARDIOPULMONARY FUNCTIONAL CAPACITY PERCENT: 87 %
CARDIOPULMONARY FUNCTIONAL CAPACITY PREDICTED: 22.8 ML/KG/MIN
CARDIOPULMONARY FVC (L) ACTUAL: 2.71
CARDIOPULMONARY FVC (L) PERCENT: 85 %
CARDIOPULMONARY FVC (L) PREDICTED: 3.2
CARDIOPULMONARY HEART RATE REST: 72 BPM
CARDIOPULMONARY MET'S REST: 1.1
CARDIOPULMONARY MINUTE VENTILATION REST: 8.9 L/MIN
CARDIOPULMONARY MINUTE VENTILATION VO2MAX: 37.8 L/MIN
CARDIOPULMONARY MYOCARDIAC O2 DEMAND MAX: NORMAL
CARDIOPULMONARY OXYGEN CONSUMPTION REST: 4 ML/KG/MIN
CARDIOPULMONARY OXYGEN CONSUMPTION VO2MAX: 19.8 ML/KG/MIN
CARDIOPULMONARY OXYGEN PULSE REST: 3 ML/BEAT
CARDIOPULMONARY OXYGEN PULSE VO2MAX: 6.5 ML/BEAT
CARDIOPULMONARY OXYGEN SATURATION- OXIMETRY REST: 100 %
CARDIOPULMONARY OXYGEN SATURATION- OXIMETRY VO2MAX: 99 %
CARDIOPULMONARY PET C02 REST: 28
CARDIOPULMONARY PET C02 VO2MAX: 37
CARDIOPULMONARY PET02 REST: 114
CARDIOPULMONARY PET02 V02 MAX: 113
CARDIOPULMONARY RER: 1.09
CARDIOPULMONARY RESPIRALORY EXCHANGE RATIO VO2MAX: 1.09
CARDIOPULMONARY RESPIRALORY EXCHANGE RATIO: 0.86
CARDIOPULMONARY RESPIRATORY RATE REST: 26 BR/MIN
CARDIOPULMONARY RESPIRATORY RATE VO2MAX: 29 BR/MIN
CARDIOPULMONARY STRESS BASE 1 BP MMHG: NORMAL MMHG
CARDIOPULMONARY STRESS BASE 1 BPA: 146 BPM
CARDIOPULMONARY STRESS BASE 1 SPO2: 99 % SPO2
CARDIOPULMONARY STRESS BASE 1 TIME SEC: 0 SEC
CARDIOPULMONARY STRESS BASE 1 TIME: 1 MINS
CARDIOPULMONARY STRESS BASE 2 BP MMHG: NORMAL MMHG
CARDIOPULMONARY STRESS BASE 2 BPA: 85 BPM
CARDIOPULMONARY STRESS BASE 2 SPO2: 100 % SPO2
CARDIOPULMONARY STRESS BASE 2 TIME SEC: 0 SEC
CARDIOPULMONARY STRESS BASE 2 TIME: 3 MINS
CARDIOPULMONARY STRESS BASE 3 BP MMHG: NORMAL MMHG
CARDIOPULMONARY STRESS BASE 3 BPA: 70 BPM
CARDIOPULMONARY STRESS BASE 3 SPO2: 100 % SPO2
CARDIOPULMONARY STRESS BASE 3 TIME SEC: 0 SEC
CARDIOPULMONARY STRESS BASE 3 TIME: 5 MINS
CARDIOPULMONARY STRESS PHASE 1 BP MMHG: NORMAL MMHG
CARDIOPULMONARY STRESS PHASE 1 BPM: 99 BPM
CARDIOPULMONARY STRESS PHASE 1 SPO2: 100 % SPO2
CARDIOPULMONARY STRESS PHASE 1 TIME SEC: 0 SEC
CARDIOPULMONARY STRESS PHASE 1 TIME: 3 MINS
CARDIOPULMONARY STRESS PHASE 2 BP MMHG: NORMAL MMHG
CARDIOPULMONARY STRESS PHASE 2 BPM: 126 BPM
CARDIOPULMONARY STRESS PHASE 2 SPO2: 99 % SPO2
CARDIOPULMONARY STRESS PHASE 2 TIME SEC: 0 SEC
CARDIOPULMONARY STRESS PHASE 2 TIME: 6 MINS
CARDIOPULMONARY STRESS PHASE 3 BP MMHG: NORMAL MMHG
CARDIOPULMONARY STRESS PHASE 3 BPM: 167 BPM
CARDIOPULMONARY STRESS PHASE 3 SPO2: 99 % SPO2
CARDIOPULMONARY STRESS PHASE 3 TIME SEC: 11 SEC
CARDIOPULMONARY STRESS PHASE 3 TIME: 8 MINS
CARDIOPULMONARY SVC (L) ACTUAL: 2.36
CARDIOPULMONARY SVC (L) PERCENT: 74 %
CARDIOPULMONARY SVC (L) PREDICTED: 3.2
CARDIOPULMONARY TIDAL VOLUME REST: 347 ML
CARDIOPULMONARY TIDAL VOLUME VO2MAX: 1291 ML
CARDIOPULMONARY VE/VCO2 SLOPE: 26.37
CARDIOPULMONARY VENTILATORY EQUIVALENT 02 REST: 41
CARDIOPULMONARY VENTILATORY EQUIVALENT 02 V02: 33
CARDIOPULMONARY VENTILATORY EQUIVALENT C02 REST: 48
CARDIOPULMONARY VENTILATORY EQUIVALENT C02 SLOPE VO2MAX: 26.37
CARDIOPULMONARY VENTILATORY EQUIVALENT C02 VO2MAX: 30
CV STRESS MAX HR HE: 167
PREDICTED VO2MAX: 22.8
STRESS ANGINA INDEX: 0
STRESS ECHO BASELINE BP: NORMAL MMHG
STRESS ECHO BASELINE HR: 62 BPM
STRESS ECHO CALCULATED PERCENT HR: 114 %
STRESS ECHO LAST STRESS BP: NORMAL MMHG
STRESS ECHO POST ESTIMATED WORKLOAD: 5.7 METS
STRESS ECHO POST EXERCISE DUR MIN: 8 MIN
STRESS ECHO POST EXERCISE DUR SEC: 11 SEC
STRESS ECHO TARGET HR: 147

## 2024-07-07 SDOH — HEALTH STABILITY: PHYSICAL HEALTH: ON AVERAGE, HOW MANY MINUTES DO YOU ENGAGE IN EXERCISE AT THIS LEVEL?: 30 MIN

## 2024-07-07 SDOH — HEALTH STABILITY: PHYSICAL HEALTH: ON AVERAGE, HOW MANY DAYS PER WEEK DO YOU ENGAGE IN MODERATE TO STRENUOUS EXERCISE (LIKE A BRISK WALK)?: 3 DAYS

## 2024-07-17 ENCOUNTER — ANCILLARY PROCEDURE (OUTPATIENT)
Dept: MAMMOGRAPHY | Facility: CLINIC | Age: 74
End: 2024-07-17
Attending: INTERNAL MEDICINE
Payer: COMMERCIAL

## 2024-07-17 ENCOUNTER — OFFICE VISIT (OUTPATIENT)
Dept: INTERNAL MEDICINE | Facility: CLINIC | Age: 74
End: 2024-07-17
Payer: COMMERCIAL

## 2024-07-17 VITALS
WEIGHT: 119.2 LBS | BODY MASS INDEX: 18.07 KG/M2 | SYSTOLIC BLOOD PRESSURE: 138 MMHG | HEIGHT: 68 IN | HEART RATE: 66 BPM | OXYGEN SATURATION: 100 % | DIASTOLIC BLOOD PRESSURE: 73 MMHG

## 2024-07-17 DIAGNOSIS — H92.01 RIGHT EAR PAIN: ICD-10-CM

## 2024-07-17 DIAGNOSIS — Z13.6 CARDIOVASCULAR SCREENING; LDL GOAL LESS THAN 160: ICD-10-CM

## 2024-07-17 DIAGNOSIS — R73.09 ELEVATED GLUCOSE: ICD-10-CM

## 2024-07-17 DIAGNOSIS — Z00.00 PHYSICAL EXAM: Primary | ICD-10-CM

## 2024-07-17 DIAGNOSIS — Z12.31 VISIT FOR SCREENING MAMMOGRAM: ICD-10-CM

## 2024-07-17 PROCEDURE — 77067 SCR MAMMO BI INCL CAD: CPT | Performed by: STUDENT IN AN ORGANIZED HEALTH CARE EDUCATION/TRAINING PROGRAM

## 2024-07-17 PROCEDURE — G0439 PPPS, SUBSEQ VISIT: HCPCS | Performed by: INTERNAL MEDICINE

## 2024-07-17 PROCEDURE — 77063 BREAST TOMOSYNTHESIS BI: CPT | Performed by: STUDENT IN AN ORGANIZED HEALTH CARE EDUCATION/TRAINING PROGRAM

## 2024-07-17 NOTE — PROGRESS NOTES
Preventive Care Visit  [facility]  Kelley Hernandez MD, Internal Medicine    Pt was seen and examined by me;  I agree with the resident's note and A/P as documented. Due for labs today, ear pain appears to be referred pain from recent dental work. Encouraged to obtain zoster vaccine at local pharmacy.    Kelley Hernandez MD     Assessment & Plan     Sarah العلي is a 74 yo female coming in for her preventative care visit. She has been having some R ear pain following a crown replacement last week, but otherwise has been doing well. Recently saw cardiology with Echocardiogram and Stress Test, no updates from that standpoint. Labs today and she will be getting Zoster vaccine at local pharmacy. Follow up in one year unless needed earlier.     (Z00.00) Physical exam  (primary encounter diagnosis)  Comment: Due for yearly labs, ordered as below.   Plan: TSH with free T4 reflex, Hepatic panel         (Albumin, ALT, AST, Bili, Alk Phos, TP)    (H92.01) Right ear pain  Comment: R ear pain following crown replacement last week. The pain is waxing and waning in nature and accompanied by the sense of ear fullness without otalgia, notable hearing changes, drainage from the ear, fevers, chills, mouth pain, swelling, etc. The pain was better at home with Tylenol. My exam today including bilateral TMs and external ears, neck, lymph, sinuses, nares, and oral exam was completely benign. No signs of infection including purulent drainage, erythema, LAD, diaphoresis, or edema. Suspect that this is referred pain with some reactive swelling related to her recent dental work. Recommended Tylenol at home +/- hot compress for the sense of fullness and to follow up with her dentist if the symptoms continue and pt is in agreement with this plan.     (Z13.6) CARDIOVASCULAR SCREENING; LDL GOAL LESS THAN 160  Comment: Due for lipid screening; pt is not fasting today.   Plan: Lipid panel  reflex to direct LDL Non-fasting    (R73.09) Elevated glucose  Comment: Single measurement of elevated fasting glucose on BMP from  with glucose at 103. No h/o prediabetes or elevated fasting glucose previously, last A1c was 3/2023 and came back at 5.3. No recent polydipsia or polyuria per her hx, no concerning findings on exam including increase in weight, acanthosis nigricans, etc. However, given her age, known CV disease, will recheck A1c with annual labs and follow up as needed.   Plan: Hemoglobin A1c        Counseling  Appropriate preventive services were addressed with this patient via screening, questionnaire, or discussion as appropriate for fall prevention, nutrition, physical activity, Tobacco-use cessation, weight loss and cognition.  Checklist reviewing preventive services available has been given to the patient.    Return in about 1 year (around 2025) for Routine preventive.    Subjective   Magdiel is a 73 year old, presenting for the followin/17/2024    10:53 AM   Additional Questions   Roomed by GLADYS, EMT       Health Care Directive  Patient has a Health Care Directive on file    HPI  Physical (Ear pain, maybe associated with crown placement)    Crown placed R upper molar last week and started having ear within the next 12 hours. She called her dentist to be seen, but never went in because the pain resolved. However, yesterday, the pain restarted again. Magdiel also describes ear fullness that has remained despite the pain now being gone. This has not changed. She is not certain about hearing changes in the ear given loss of hearing at baseline there. Additionally, endorses the sensation of feeling congested in her sinuses. Denies fevers and chills. No drainage from the ear. Her baseline post nasal drainage is unchanged. Regarding the tooth itself she is having a hard time seeing it -- there does not seem to be significant inflammation but she notes her lichen planus is flaring. She has  been using Lidex gel for this and it working well.     Exertional pHTN  HFpEF; SVT  She has a Fitbit and sometimes it reads that she has Afib. When she has gone in for evaluation on this, she has been told that she is not in Afib. However, she does endorse a sense of heart pounding. She just notices this intermittently, but it does not bother her overly.     She saw cardiology in June and had an Echocardiogram and Stress Test done; the latter was limited by her hip pain. Her cardiologist was overall happy with where she is at and does not want to see her again for another year. No medication changes were made at her appointment.         7/7/2024   General Health   How would you rate your overall physical health? Good   Feel stress (tense, anxious, or unable to sleep) Only a little            7/7/2024   Nutrition   Diet: Low salt            7/7/2024   Exercise   Days per week of moderate/strenous exercise 3 days   Average minutes spent exercising at this level 30 min            7/7/2024   Social Factors   Worry food won't last until get money to buy more No   Food not last or not have enough money for food? No   Do you have housing? (Housing is defined as stable permanent housing and does not include staying ouside in a car, in a tent, in an abandoned building, in an overnight shelter, or couch-surfing.) Yes   Are you worried about losing your housing? No   Lack of transportation? No   Unable to get utilities (heat,electricity)? No            7/7/2024   Activities of Daily Living- Home Safety   Needs help with the following daily activites None of the above   Safety concerns in the home None of the above            7/7/2024   Dental   Dentist two times every year? Yes            7/7/2024   Hearing Screening   Hearing concerns? (!) I NEED TO ASK PEOPLE TO SPEAK UP OR REPEAT THEMSELVES.    (!) IT'S HARDER TO UNDERSTAND WOMEN'S VOICES THAN MEN'S VOICES.    (!) IT'S HARD TO FOLLOW A CONVERSATION IN A NOISY RESTAURANT OR  CROWDED ROOM.    (!) TROUBLE UNDERSTANDING SOFT OR WHISPERED SPEECH.       Multiple values from one day are sorted in reverse-chronological order           2024   Urinary Incontinence Screening   Bothered by leaking urine in past 6 months No            2024   TB Screening   Were you born outside of the [place]? No              Today's PHQ-2 Score:       2024    11:20 AM   PHQ-2 (  [employer])   Q1: Little interest or pleasure in doing things 0   Q2: Feeling down, depressed or hopeless 0   PHQ-2 Score 0         2024   Substance Use   Alcohol more than 3/day or more than 7/wk No   Do you have a current opioid prescription? No   How severe/bad is pain from 1 to 10? 0/10 (No Pain)   Do you use any other substances recreationally? No        Social History     Tobacco Use    Smoking status: Former     Current packs/day: 0.00     Average packs/day: 0.5 packs/day for 12.0 years (6.0 ttl pk-yrs)     Types: Cigarettes     Start date: 1968     Quit date: 1980     Years since quittin.5    Smokeless tobacco: Never   Vaping Use    Vaping status: Never Used   Substance Use Topics    Alcohol use: Not Currently     Alcohol/week: 1.0 - 2.0 standard drink of alcohol     Comment: Minimal 1 glass wine approx. 2x/month    Drug use: No           2024   LAST FHS-7 RESULTS   1st degree relative breast or ovarian cancer No   Any relative bilateral breast cancer No   Any male have breast cancer No   Any ONE woman have BOTH breast AND ovarian cancer No   Any woman with breast cancer before 50yrs No   2 or more relatives with breast AND/OR ovarian cancer No   2 or more relatives with breast AND/OR bowel cancer No           Mammogram Screening - Mammogram every 1-2 years updated in Health Maintenance based on mutual decision making    ASCVD Risk   The ASCVD Risk score (Harley RENTERIA, et al., 2019) failed to calculate for the following reasons:    The patient has a prior MI or stroke diagnosis    Fracture Risk  "Assessment Tool  Link to Frax Calculator  Use the information below to complete the Frax calculator  : 1950  Sex: female  Weight (kg): 54.1 kg (actual weight)  Height (cm): 171.5 cm  Previous Fragility Fracture:  No  History of parent with fractured hip:  No  Current Smoking:  No  Patient has been on glucocorticoids for more than 3 months (5mg/day or more): No  Rheumatoid Arthritis on Problem List:  No  Secondary Osteoporosis on Problem List:  No  Consumes 3 or more units of alcohol per day: No  Femoral Neck BMD (g/cm2)            Reviewed and updated as needed this visit by Provider                    Past Medical History:   Diagnosis Date    Age-related osteoporosis without current pathological fracture 2019    Benign positional vertigo decades    Bilateral hearing loss, unspecified hearing loss type 2018    CARDIOVASCULAR SCREENING; LDL GOAL LESS THAN 160 2013    Cerebral aneurysm 2017    Erythema multiforme 2016    GERD (gastroesophageal reflux disease)     Hearing problem '04 & \"18    SNHL mild L ear;moderate-severe R ear  >75% loss word compre    Hematuria 2016    Overview:  Has had urologic evaluation    Laryngospasm 2016    Lichen planus     Migraine     with auora    Migraines 2000    Ocular migraine/ Migraine with Aura    Ocular migraine 2016    Oral lichen planus 2016    Osteopenia 2010    Osteopenia of left hip 2018    Physical exam 2016    Overview:  Full code.  Would want her  and son to make medical decisions for her if she were unable to do so.  Does not have an advanced directive.     Overview:  Diet - tries to eat a healthy diet  Exercise - \"I'm a fair weather walker\"  Active during the day Mammogram - 10/2017  Colonoscopy - 2008 - next in   DXA - 2015 - next in   Immunizations -  Up to date     Pulmonary hypertension (H)     Right internal carotid artery aneurysm 2018    5 mm    Sensorineural hearing " loss 2/04 & 4/18 2/18 worsened    Tinnitus 12/2018    R ear only    White coat syndrome without diagnosis of hypertension 12/06/2018     Past Surgical History:   Procedure Laterality Date    CV RIGHT HEART CATH MEASUREMENTS RECORDED N/A 11/10/2021    Procedure: CV RIGHT HEART CATH;  Surgeon: Negrita Rees MD;  Location:  HEART CARDIAC CATH LAB    CV RIGHT HEART EXERCISE STRESS STUDY N/A 11/10/2021    Procedure: Right Heart Exercise Stress Study;  Surgeon: Negrita Rees MD;  Location: [facility] CARDIAC CATH LAB    DILATION AND CURETTAGE      ESOPHAGOSCOPY, GASTROSCOPY, DUODENOSCOPY (EGD), COMBINED N/A 9/22/2023    Procedure: Esophagoscopy, gastroscopy, duodenoscopy (EGD), combined;  Surgeon: Brian Shah MD;  Location:  GI    LAPAROSCOPY      TONSILLECTOMY  1958    TONSILLECTOMY & ADENOIDECTOMY       Current providers sharing in care for this patient include:  Patient Care Team:  Kelley Alves MD as PCP - General (Internal Medicine)  Chetna Arciniega PA-C as Physician Assistant (Physician Assistant)  Jorden Garces Chi, OD as MD (Optometry)  Maye Sands MD as MD (Urology)  Jazmin Green, RN as Specialty Care Coordinator (Urology)  Negrita Rees MD as MD (Cardiovascular Disease)  Cyndee Fischer, RN as Specialty Care Coordinator (Cardiology)  Negrita Rees MD as Assigned Heart and Vascular Provider  Savana Gordon, RN as Specialty Care Coordinator (Cardiology)  Edmundo Ro MD as MD (Cardiovascular Disease)  Kelley Alves MD as Assigned PCP  Israel Winkler MD as MD (Otolaryngology)  Sintia De Anda MD as MD (Dermatology)  Sintia De Anda MD as Assigned Surgical Provider    The following health maintenance items are reviewed in Epic and correct as of today:  Health Maintenance   Topic Date Due    HF ACTION PLAN  Never done    TSH W/FREE T4 REFLEX  Never done    ANNUAL REVIEW OF HM ORDERS  Never done    ZOSTER IMMUNIZATION  "(2 of 3) 09/16/2013    LIPID  09/18/2021    ALT  12/19/2023    MEDICARE ANNUAL WELLNESS VISIT  07/12/2024    INFLUENZA VACCINE (1) 09/01/2024    ADVANCE CARE PLANNING  11/06/2024    BMP  12/13/2024    CBC  06/13/2025    MAMMO SCREENING  06/21/2025    FALL RISK ASSESSMENT  07/17/2025    GLUCOSE  06/13/2027    COLORECTAL CANCER SCREENING  03/19/2028    DTAP/TDAP/TD IMMUNIZATION (4 - Td or Tdap) 04/02/2031    DEXA  09/11/2038    HEPATITIS C SCREENING  Completed    PHQ-2 (once per calendar year)  Completed    Pneumococcal Vaccine: 65+ Years  Completed    RSV VACCINE (Pregnancy & 60+)  Completed    COVID-19 Vaccine  Completed    IPV IMMUNIZATION  Aged Out    HPV IMMUNIZATION  Aged Out    MENINGITIS IMMUNIZATION  Aged Out    RSV MONOCLONAL ANTIBODY  Aged Out        Objective    Exam  /73 (BP Location: Right arm, Patient Position: Sitting, Cuff Size: Adult Regular)   Pulse 66   Ht 1.715 m (5' 7.52\")   Wt 54.1 kg (119 lb 3.2 oz)   SpO2 100%   BMI 18.38 kg/m     Estimated body mass index is 18.38 kg/m  as calculated from the following:    Height as of this encounter: 1.715 m (5' 7.52\").    Weight as of this encounter: 54.1 kg (119 lb 3.2 oz).    Physical Exam  Constitutional:       Appearance: Normal appearance. She is normal weight.   HENT:      Head: Normocephalic and atraumatic.      Jaw: Tenderness (Point TTP (red Kake), no radiation or overlying swelling) present. No swelling or pain on movement.      Salivary Glands: Right salivary gland is not tender. Left salivary gland is not tender.        Right Ear: Tympanic membrane, ear canal and external ear normal.      Left Ear: Tympanic membrane, ear canal and external ear normal.      Nose: Nose normal.      Mouth/Throat:      Mouth: Mucous membranes are moist.      Dentition: Normal dentition. No dental tenderness, gingival swelling or dental abscesses.      Pharynx: Oropharynx is clear. No oropharyngeal exudate or posterior oropharyngeal erythema.   Eyes:     "  Extraocular Movements: Extraocular movements intact.      Conjunctiva/sclera: Conjunctivae normal.   Neck:      Thyroid: No thyromegaly.   Cardiovascular:      Rate and Rhythm: Normal rate and regular rhythm.      Pulses: Normal pulses.      Heart sounds: Normal heart sounds.   Pulmonary:      Effort: Pulmonary effort is normal. No respiratory distress.      Breath sounds: Normal breath sounds. No wheezing.   Abdominal:      General: Abdomen is flat. Bowel sounds are normal.      Palpations: Abdomen is soft.      Tenderness: There is no abdominal tenderness.   Musculoskeletal:         General: Normal range of motion.      Cervical back: Normal range of motion. No edema. No muscular tenderness.      Right lower leg: No edema.      Left lower leg: No edema.   Lymphadenopathy:      Cervical: No cervical adenopathy.   Skin:     General: Skin is warm and dry.   Neurological:      General: No focal deficit present.      Mental Status: She is alert and oriented to person, place, and time. Mental status is at baseline.   Psychiatric:         Mood and Affect: Mood normal.         Behavior: Behavior normal.           7/17/2024   Mini Cog   Clock Draw Score 2 Normal   3 Item Recall 3 objects recalled   Mini Cog Total Score 5        Phyllis hSabazz MD  Internal Medicine Resident, PGY1

## 2024-07-17 NOTE — PATIENT INSTRUCTIONS
I recommend obtaining zoster vaccination at your local pharmacy to avoid a charge.  We will check labs today and get back you with results.  Dr. Ivory

## 2024-07-22 ENCOUNTER — LAB (OUTPATIENT)
Dept: LAB | Facility: CLINIC | Age: 74
End: 2024-07-22
Payer: COMMERCIAL

## 2024-07-22 ENCOUNTER — OFFICE VISIT (OUTPATIENT)
Dept: ENDOCRINOLOGY | Facility: CLINIC | Age: 74
End: 2024-07-22
Payer: COMMERCIAL

## 2024-07-22 VITALS
DIASTOLIC BLOOD PRESSURE: 79 MMHG | WEIGHT: 120 LBS | SYSTOLIC BLOOD PRESSURE: 145 MMHG | OXYGEN SATURATION: 98 % | HEART RATE: 60 BPM | BODY MASS INDEX: 18.51 KG/M2 | RESPIRATION RATE: 16 BRPM

## 2024-07-22 DIAGNOSIS — K21.9 GASTROESOPHAGEAL REFLUX DISEASE, UNSPECIFIED WHETHER ESOPHAGITIS PRESENT: ICD-10-CM

## 2024-07-22 DIAGNOSIS — M81.0 AGE-RELATED OSTEOPOROSIS WITHOUT CURRENT PATHOLOGICAL FRACTURE: Primary | ICD-10-CM

## 2024-07-22 DIAGNOSIS — Z00.00 PHYSICAL EXAM: ICD-10-CM

## 2024-07-22 DIAGNOSIS — R73.09 ELEVATED GLUCOSE: ICD-10-CM

## 2024-07-22 DIAGNOSIS — Z13.6 CARDIOVASCULAR SCREENING; LDL GOAL LESS THAN 160: ICD-10-CM

## 2024-07-22 LAB
ALBUMIN SERPL BCG-MCNC: 4.6 G/DL (ref 3.5–5.2)
ALP SERPL-CCNC: 29 U/L (ref 40–150)
ALT SERPL W P-5'-P-CCNC: 13 U/L (ref 0–50)
AST SERPL W P-5'-P-CCNC: 24 U/L (ref 0–45)
BILIRUB DIRECT SERPL-MCNC: <0.2 MG/DL (ref 0–0.3)
BILIRUB SERPL-MCNC: 0.2 MG/DL
CHOLEST SERPL-MCNC: 207 MG/DL
FASTING STATUS PATIENT QL REPORTED: NO
HBA1C MFR BLD: 5.3 % (ref 0–5.6)
HDLC SERPL-MCNC: 89 MG/DL
LDLC SERPL CALC-MCNC: 92 MG/DL
NONHDLC SERPL-MCNC: 118 MG/DL
PROT SERPL-MCNC: 7 G/DL (ref 6.4–8.3)
TRIGL SERPL-MCNC: 130 MG/DL
TSH SERPL DL<=0.005 MIU/L-ACNC: 1.95 UIU/ML (ref 0.3–4.2)

## 2024-07-22 PROCEDURE — 84443 ASSAY THYROID STIM HORMONE: CPT

## 2024-07-22 PROCEDURE — G2211 COMPLEX E/M VISIT ADD ON: HCPCS | Performed by: INTERNAL MEDICINE

## 2024-07-22 PROCEDURE — 80076 HEPATIC FUNCTION PANEL: CPT

## 2024-07-22 PROCEDURE — 99204 OFFICE O/P NEW MOD 45 MIN: CPT | Performed by: INTERNAL MEDICINE

## 2024-07-22 PROCEDURE — 83036 HEMOGLOBIN GLYCOSYLATED A1C: CPT

## 2024-07-22 PROCEDURE — 36415 COLL VENOUS BLD VENIPUNCTURE: CPT

## 2024-07-22 PROCEDURE — 80061 LIPID PANEL: CPT

## 2024-07-22 RX ORDER — EPINEPHRINE 1 MG/ML
0.3 INJECTION, SOLUTION INTRAMUSCULAR; SUBCUTANEOUS EVERY 5 MIN PRN
Status: CANCELLED | OUTPATIENT
Start: 2024-08-05

## 2024-07-22 RX ORDER — HEPARIN SODIUM (PORCINE) LOCK FLUSH IV SOLN 100 UNIT/ML 100 UNIT/ML
5 SOLUTION INTRAVENOUS
Status: CANCELLED | OUTPATIENT
Start: 2024-08-05

## 2024-07-22 RX ORDER — METHYLPREDNISOLONE SODIUM SUCCINATE 125 MG/2ML
125 INJECTION, POWDER, LYOPHILIZED, FOR SOLUTION INTRAMUSCULAR; INTRAVENOUS
Status: CANCELLED
Start: 2024-08-05

## 2024-07-22 RX ORDER — ALBUTEROL SULFATE 90 UG/1
1-2 AEROSOL, METERED RESPIRATORY (INHALATION)
Status: CANCELLED
Start: 2024-08-05

## 2024-07-22 RX ORDER — HEPARIN SODIUM,PORCINE 10 UNIT/ML
5-20 VIAL (ML) INTRAVENOUS DAILY PRN
Status: CANCELLED | OUTPATIENT
Start: 2024-08-05

## 2024-07-22 RX ORDER — ZOLEDRONIC ACID 5 MG/100ML
5 INJECTION, SOLUTION INTRAVENOUS ONCE
Status: CANCELLED
Start: 2024-08-05

## 2024-07-22 RX ORDER — MEPERIDINE HYDROCHLORIDE 25 MG/ML
25 INJECTION INTRAMUSCULAR; INTRAVENOUS; SUBCUTANEOUS EVERY 30 MIN PRN
Status: CANCELLED | OUTPATIENT
Start: 2024-08-05

## 2024-07-22 RX ORDER — ALBUTEROL SULFATE 0.83 MG/ML
2.5 SOLUTION RESPIRATORY (INHALATION)
Status: CANCELLED | OUTPATIENT
Start: 2024-08-05

## 2024-07-22 RX ORDER — ACETAMINOPHEN 325 MG/1
650 TABLET ORAL EVERY 8 HOURS PRN
Status: CANCELLED | OUTPATIENT
Start: 2024-08-05

## 2024-07-22 RX ORDER — DIPHENHYDRAMINE HYDROCHLORIDE 50 MG/ML
50 INJECTION INTRAMUSCULAR; INTRAVENOUS
Status: CANCELLED
Start: 2024-08-05

## 2024-07-22 NOTE — LETTER
7/22/2024      Sarah العلي  [address] [facility] [facility] [facility] [address]      Dear Colleague,    Thank you for referring your patient, Sarah العلي, to the [facility]. Please see a copy of my visit note below.         Endocrinology Note         Sarah is a 73 year old female presents today for osteoporosis.    HPI  Sarah is a 73 year old female with hx of right internal carotid artery aneurysm, ocular migraine with aura, Raynaud's phenomenon, GERD presents today for follow up osteoporosis    Patient also with osteopenia for long time. DXA scan in 2013 showed T score of -2.4 left femoral neck.  She has had serial DXA scan every 2 or 3 years that continues to show osteopenia in the left femoral neck.  Patient denies history of fracture. Her mother may have had osteoporosis.  She has been on proton pump inhibitor for more than 10 years for GERD. She was on several short course of prednisone for erythema multiforme. Her last prednisone course was 3 years ago.  She has never been on any steroid injection.  She denies any history of rheumatoid arthritis or autoimmune disease.  She does not smoked.  She drinks wine a couple of time a month.  Weight has been stable.  BMI ranged in 20 kg/m2.       Interval history:  She received Reclast infusion 4/2019 and 4/2021.  She saw Dr. Evelyn Hightower and started Prolia in August 2022 based on her T-scores and report of loss of bone density in the spine in 2021. She is due for Prolia in 9/2024.    She reports doing well. No fracture or fall. She does not take calcium supplement due to constipation. She reports having about 3 serving of dairy per day. She is active and walks regularly. She denies imbalance or gait instability.     DXA 9/11/2023 showed the T-score of -0.1 at the lumbar spine, -2.3 at the left femoral neck, -1.9 at the right femoral neck, -1.5 at the left total hip, -0.9 at the right hip. There has been increased in bone density at the  "lumbar spine but not hip.    Past Medical History  Past Medical History:   Diagnosis Date     Age-related osteoporosis without current pathological fracture 02/22/2019     Benign positional vertigo decades     Bilateral hearing loss, unspecified hearing loss type 12/06/2018     CARDIOVASCULAR SCREENING; LDL GOAL LESS THAN 160 07/17/2013     Cerebral aneurysm 12/2017     Erythema multiforme 09/26/2016     GERD (gastroesophageal reflux disease)      Hearing problem '04 & \"18    SNHL mild L ear;moderate-severe R ear  >75% loss word compre     Hematuria 09/26/2016    Overview:  Has had urologic evaluation     Laryngospasm 09/26/2016     Lichen planus      Migraine     with auora     Migraines 2000    Ocular migraine/ Migraine with Aura     Ocular migraine 09/26/2016     Oral lichen planus 09/26/2016     Osteopenia 2010     Osteopenia of left hip 12/12/2018     Physical exam 09/26/2016    Overview:  Full code.  Would want her  and son to make medical decisions for her if she were unable to do so.  Does not have an advanced directive.     Overview:  Diet - tries to eat a healthy diet  Exercise - \"I'm a fair weather walker\"  Active during the day Mammogram - 10/2017  Colonoscopy - 2/2008 - next in 2018  DXA - 8/2015 - next in 2018  Immunizations -  Up to date      Pulmonary hypertension (H)      Right internal carotid artery aneurysm 12/06/2018    5 mm     Sensorineural hearing loss 2/04 & 4/18 2/18 worsened     Tinnitus 12/2018    R ear only     White coat syndrome without diagnosis of hypertension 12/06/2018       Allergies  Allergies   Allergen Reactions     Atorvastatin Rash     Nickel Rash     Other reaction(s): *Unknown  Other reaction(s): *Unknown     Medications  Current Outpatient Medications   Medication Sig Dispense Refill     albuterol (PROAIR HFA/PROVENTIL HFA/VENTOLIN HFA) 108 (90 Base) MCG/ACT inhaler Inhale 2 puffs into the lungs every 6 hours as needed for shortness of breath, wheezing or cough " "18 g 1     Cholecalciferol (VITAMIN D3) 1000 UNITS CAPS Take 1 capsule by mouth daily       fluocinonide (LIDEX) 0.05 % external gel Apply topically 2 times daily 15 g 3     Family History  family history includes Breast Cancer in her paternal aunt; Cancer in her paternal grandmother; Cerebrovascular Disease in her father; Diabetes in her cousin and maternal grandmother; Heart Disease in her maternal grandfather; Heart Failure in her maternal grandfather; Other - See Comments in her mother.   Maternal grandmother had type 1 diabetes  Cousin has type 1 diabetes    Social History  Social History     Tobacco Use     Smoking status: Former     Current packs/day: 0.00     Average packs/day: 0.5 packs/day for 12.0 years (6.0 ttl pk-yrs)     Types: Cigarettes     Start date: 1968     Quit date: 1980     Years since quittin.5     Smokeless tobacco: Never   Vaping Use     Vaping status: Never Used   Substance Use Topics     Alcohol use: Not Currently     Alcohol/week: 1.0 - 2.0 standard drink of alcohol     Comment: Minimal 1 glass wine approx. 2x/month     Drug use: No   former smoker, quit   Drink wine a couple times per month  She is retired from register nurse    ROS  10 points ROS were negative otherwise mentioned in HPI    Physical Exam  Vital signs:   BP (!) 150/73 (BP Location: Left arm, Patient Position: Sitting, Cuff Size: Adult Regular)   Pulse 58   Resp 16   Wt 54.4 kg (120 lb)   SpO2 98%   BMI 18.51 kg/m    Estimated body mass index is 18.51 kg/m  as calculated from the following:    Height as of 24: 1.715 m (5' 7.52\").    Weight as of this encounter: 54.4 kg (120 lb).Constitutional: no distress, comfortable, pleasant   GA: pleasant, NAD  Back: no spinal tenderness  NS: CN grossly intact, normal gait  Psychological: appropriate mood       RESULTS  I have personally reviewed labs and images. I also reviewed labs with patient and discussed the result and plan of care.    DXA " 7/17/2013  FINDINGS: The worst lumbar spine T-score measurement is -0.7 at L2.   The right femoral neck T-score measurement is -2.0 and the left femoral neck measurement is -2.4, compared with a measurement of -1.6 on the 2011 exam. The bone density measurement left femoral neck is 0.702 gm/cm2.     IMPRESSION: Prominent osteopenia/borderline osteoporosis in the femoral neck, progressed since 2011.    DXA 8/10/2015  T-SCORES:  Lumbar Spine L1-L4 T-score: -0.5     Left Hip (Neck) T-score: -2.3  Right Hip (Neck) T-score: -1.9     Hip lowest BMD: 0.712 gm/cm2.     PERCENT CHANGE since 7/17/2013:  Lumbar Spine: +0.1%   Femurs: +2.7%     IMPRESSION: Bilateral hip osteopenia.     DXA 12/21/2018      DXA 1/9/2020  Conclusions:  The most negative and valid T-score of -2.4 at the level of the left femoral neck corresponds with low bone density according to WHO criteria for postmenopausal females and men age 50 and over.      The risk of osteoporotic fracture increases approximately 2-fold for each 1.0 SD decrease in T-score.     Comparison Exams:  Taking into account the precision errors (ref #3 below) for this center:  These calculated changes in BMD to the previous exam (2018) are significantly increased at the level of the lumbar spine and right total hip by 4% and 3.4%    DXA 4/6/2021  Conclusions:  The most negative and valid T-score of -2.5 at the level of the left femoral neck corresponds with osteoporosis according to WHO criteria for postmenopausal females and men age 50 and over.       Comparison Exams:  Taking into account the precision errors (ref #3 below) for this center:  These calculated changes in BMD to the previous exam (1/9/2020) are significantly decreased at the level of the lumbar spine (-2.8%)  These calculated changes in BMD to the baseline exam (12/21/2018) are significantly increased at the level of the right total hip (+2.9%)    DXA 9/11/2023  Impression  Based on BMD diagnosis is consistent with  low bone density based on WHO criteria Ref. 1     Results   Lumbar spine   T-score -0.1 (L1-4), BMD is 1.170 g/cm2     Left femoral neck  T-score -2.3     Right femoral neck  T-score -1.9     Left total femur  T-score -1.5 , BMD is 0.819 g/cm2     Right total femur  T-score -0.9, BMD is 0.897 g/cm2     Interval change  Based on historical least significant change data, bone density compared to the prior study has changed as follows:  - increased 4.7% at the lumbar spine, this change is significant  - no significant change at the left total femur, right total femur    ASSESSMENT:    Sarah is a 73 year old female with hx of right internal carotid artery aneurysm, ocular migraine with aura, Raynaud's phenomenon, GERD presents today for osteoporosis    1) Osteoporosis: she has had long standing low bone density at least since 2013. DXA in 12/2018 showed osteoporosis particularly in the left hip.   - risk factors includes prolonged use of proton pump inhibitor, family hx of osteoporosis, intermittent use of steroid  - she received Reclast infusion 4/2019 and 4/2021 without side effects.  - started Prolia in August 2022 every 6 months based on her T-scores and report of loss of bone density in the spine in 2021. She is due for Prolia in 9/2024.    - DXA 9/11/2023 showed the T-score of -0.1 at the lumbar spine, -2.3 at the left femoral neck, -1.9 at the right femoral neck, -1.5 at the left total hip, -0.9 at the right hip. There has been increased in bone density at the lumbar spine but not hip.    - patient would like to get transition from Prolia to Reclast. I think it is reasonable to do so since her recent DXA showed improvement of BMD and her T-score is now above -2.5.    PLAN:   - check vitamin D level  - transition off Prolia and switch to Reclast infusion this year  - optimize calcium from dairy and vitamin D supplement  - continue with regular weight bear exercise -- walking  - return in 1 year after DXA  daphne Davis MD  Division of Diabetes and Endocrinology  Department of Medicine

## 2024-07-22 NOTE — NURSING NOTE
Sarah العلي's goals for this visit include:   Chief Complaint   Patient presents with    Follow Up     Senile osteoporosis       She requests these members of her care team be copied on today's visit information: yes    PCP: Kelley Alves    Referring Provider:  Kelley Hernandez MD  [address] [address]    BP (!) 150/73 (BP Location: Left arm, Patient Position: Sitting, Cuff Size: Adult Regular)   Pulse 58   Resp 16   Wt 54.4 kg (120 lb)   SpO2 98%   BMI 18.51 kg/m      Do you need any medication refills at today's visit? None    Abelardo Moeller, EMT

## 2024-07-22 NOTE — PROGRESS NOTES
Endocrinology Note         Sarah is a 73 year old female presents today for osteoporosis.    HPI  Sarah is a 73 year old female with hx of right internal carotid artery aneurysm, ocular migraine with aura, Raynaud's phenomenon, GERD presents today for follow up osteoporosis    Patient also with osteopenia for long time. DXA scan in 2013 showed T score of -2.4 left femoral neck.  She has had serial DXA scan every 2 or 3 years that continues to show osteopenia in the left femoral neck.  Patient denies history of fracture. Her mother may have had osteoporosis.  She has been on proton pump inhibitor for more than 10 years for GERD. She was on several short course of prednisone for erythema multiforme. Her last prednisone course was 3 years ago.  She has never been on any steroid injection.  She denies any history of rheumatoid arthritis or autoimmune disease.  She does not smoked.  She drinks wine a couple of time a month.  Weight has been stable.  BMI ranged in 20 kg/m2.       Interval history:  She received Reclast infusion 4/2019 and 4/2021.  She saw Dr. Evelyn Hightower and started Prolia in August 2022 based on her T-scores and report of loss of bone density in the spine in 2021. She is due for Prolia in 9/2024.    She reports doing well. No fracture or fall. She does not take calcium supplement due to constipation. She reports having about 3 serving of dairy per day. She is active and walks regularly. She denies imbalance or gait instability.     DXA 9/11/2023 showed the T-score of -0.1 at the lumbar spine, -2.3 at the left femoral neck, -1.9 at the right femoral neck, -1.5 at the left total hip, -0.9 at the right hip. There has been increased in bone density at the lumbar spine but not hip.    Past Medical History  Past Medical History:   Diagnosis Date    Age-related osteoporosis without current pathological fracture 02/22/2019    Benign positional vertigo decades    Bilateral hearing loss, unspecified hearing  "loss type 12/06/2018    CARDIOVASCULAR SCREENING; LDL GOAL LESS THAN 160 07/17/2013    Cerebral aneurysm 12/2017    Erythema multiforme 09/26/2016    GERD (gastroesophageal reflux disease)     Hearing problem '04 & \"18    SNHL mild L ear;moderate-severe R ear  >75% loss word compre    Hematuria 09/26/2016    Overview:  Has had urologic evaluation    Laryngospasm 09/26/2016    Lichen planus     Migraine     with auora    Migraines 2000    Ocular migraine/ Migraine with Aura    Ocular migraine 09/26/2016    Oral lichen planus 09/26/2016    Osteopenia 2010    Osteopenia of left hip 12/12/2018    Physical exam 09/26/2016    Overview:  Full code.  Would want her  and son to make medical decisions for her if she were unable to do so.  Does not have an advanced directive.     Overview:  Diet - tries to eat a healthy diet  Exercise - \"I'm a fair weather walker\"  Active during the day Mammogram - 10/2017  Colonoscopy - 2/2008 - next in 2018  DXA - 8/2015 - next in 2018  Immunizations -  Up to date     Pulmonary hypertension (H)     Right internal carotid artery aneurysm 12/06/2018    5 mm    Sensorineural hearing loss 2/04 & 4/18 2/18 worsened    Tinnitus 12/2018    R ear only    White coat syndrome without diagnosis of hypertension 12/06/2018       Allergies  Allergies   Allergen Reactions    Atorvastatin Rash    Nickel Rash     Other reaction(s): *Unknown  Other reaction(s): *Unknown     Medications  Current Outpatient Medications   Medication Sig Dispense Refill    albuterol (PROAIR HFA/PROVENTIL HFA/VENTOLIN HFA) 108 (90 Base) MCG/ACT inhaler Inhale 2 puffs into the lungs every 6 hours as needed for shortness of breath, wheezing or cough 18 g 1    Cholecalciferol (VITAMIN D3) 1000 UNITS CAPS Take 1 capsule by mouth daily      fluocinonide (LIDEX) 0.05 % external gel Apply topically 2 times daily 15 g 3     Family History  family history includes Breast Cancer in her paternal aunt; Cancer in her paternal " "grandmother; Cerebrovascular Disease in her father; Diabetes in her cousin and maternal grandmother; Heart Disease in her maternal grandfather; Heart Failure in her maternal grandfather; Other - See Comments in her mother.   Maternal grandmother had type 1 diabetes  Cousin has type 1 diabetes    Social History  Social History     Tobacco Use    Smoking status: Former     Current packs/day: 0.00     Average packs/day: 0.5 packs/day for 12.0 years (6.0 ttl pk-yrs)     Types: Cigarettes     Start date: 1968     Quit date: 1980     Years since quittin.5    Smokeless tobacco: Never   Vaping Use    Vaping status: Never Used   Substance Use Topics    Alcohol use: Not Currently     Alcohol/week: 1.0 - 2.0 standard drink of alcohol     Comment: Minimal 1 glass wine approx. 2x/month    Drug use: No   former smoker, quit 1983  Drink wine a couple times per month  She is retired from register nurse    ROS  10 points ROS were negative otherwise mentioned in HPI    Physical Exam  Vital signs:   BP (!) 150/73 (BP Location: Left arm, Patient Position: Sitting, Cuff Size: Adult Regular)   Pulse 58   Resp 16   Wt 54.4 kg (120 lb)   SpO2 98%   BMI 18.51 kg/m    Estimated body mass index is 18.51 kg/m  as calculated from the following:    Height as of 24: 1.715 m (5' 7.52\").    Weight as of this encounter: 54.4 kg (120 lb).Constitutional: no distress, comfortable, pleasant   GA: pleasant, NAD  Back: no spinal tenderness  NS: CN grossly intact, normal gait  Psychological: appropriate mood       RESULTS  I have personally reviewed labs and images. I also reviewed labs with patient and discussed the result and plan of care.    DXA 2013  FINDINGS: The worst lumbar spine T-score measurement is -0.7 at L2.   The right femoral neck T-score measurement is -2.0 and the left femoral neck measurement is -2.4, compared with a measurement of -1.6 on the 2011 exam. The bone density measurement left femoral neck is 0.702 " gm/cm2.     IMPRESSION: Prominent osteopenia/borderline osteoporosis in the femoral neck, progressed since 2011.    DXA 8/10/2015  T-SCORES:  Lumbar Spine L1-L4 T-score: -0.5     Left Hip (Neck) T-score: -2.3  Right Hip (Neck) T-score: -1.9     Hip lowest BMD: 0.712 gm/cm2.     PERCENT CHANGE since 7/17/2013:  Lumbar Spine: +0.1%   Femurs: +2.7%     IMPRESSION: Bilateral hip osteopenia.     DXA 12/21/2018      DXA 1/9/2020  Conclusions:  The most negative and valid T-score of -2.4 at the level of the left femoral neck corresponds with low bone density according to WHO criteria for postmenopausal females and men age 50 and over.      The risk of osteoporotic fracture increases approximately 2-fold for each 1.0 SD decrease in T-score.     Comparison Exams:  Taking into account the precision errors (ref #3 below) for this center:  These calculated changes in BMD to the previous exam (2018) are significantly increased at the level of the lumbar spine and right total hip by 4% and 3.4%    DXA 4/6/2021  Conclusions:  The most negative and valid T-score of -2.5 at the level of the left femoral neck corresponds with osteoporosis according to WHO criteria for postmenopausal females and men age 50 and over.       Comparison Exams:  Taking into account the precision errors (ref #3 below) for this center:  These calculated changes in BMD to the previous exam (1/9/2020) are significantly decreased at the level of the lumbar spine (-2.8%)  These calculated changes in BMD to the baseline exam (12/21/2018) are significantly increased at the level of the right total hip (+2.9%)    DXA 9/11/2023  Impression  Based on BMD diagnosis is consistent with low bone density based on WHO criteria Ref. 1     Results   Lumbar spine   T-score -0.1 (L1-4), BMD is 1.170 g/cm2     Left femoral neck  T-score -2.3     Right femoral neck  T-score -1.9     Left total femur  T-score -1.5 , BMD is 0.819 g/cm2     Right total femur  T-score -0.9, BMD is  0.897 g/cm2     Interval change  Based on historical least significant change data, bone density compared to the prior study has changed as follows:  - increased 4.7% at the lumbar spine, this change is significant  - no significant change at the left total femur, right total femur    ASSESSMENT:    Sarah is a 73 year old female with hx of right internal carotid artery aneurysm, ocular migraine with aura, Raynaud's phenomenon, GERD presents today for osteoporosis    1) Osteoporosis: she has had long standing low bone density at least since 2013. DXA in 12/2018 showed osteoporosis particularly in the left hip.   - risk factors includes prolonged use of proton pump inhibitor, family hx of osteoporosis, intermittent use of steroid  - she received Reclast infusion 4/2019 and 4/2021 without side effects.  - started Prolia in August 2022 every 6 months based on her T-scores and report of loss of bone density in the spine in 2021. She is due for Prolia in 9/2024.    - DXA 9/11/2023 showed the T-score of -0.1 at the lumbar spine, -2.3 at the left femoral neck, -1.9 at the right femoral neck, -1.5 at the left total hip, -0.9 at the right hip. There has been increased in bone density at the lumbar spine but not hip.    - patient would like to get transition from Prolia to Reclast. I think it is reasonable to do so since her recent DXA showed improvement of BMD and her T-score is now above -2.5.    PLAN:   - check vitamin D level  - transition off Prolia and switch to Reclast infusion this year  - optimize calcium from dairy and vitamin D supplement  - continue with regular weight bear exercise -- walking  - return in 1 year after DXA scan    Magdalena Davis MD  Division of Diabetes and Endocrinology  Department of Medicine

## 2024-07-22 NOTE — PATIENT INSTRUCTIONS
Please reach out to the following centers to schedule your appointment: for RECLAST  Infusion    [facility] [phone]   [place] [phone]   [place] [phone]   [place] [phone]   [place] [phone]   [place] [phone]   [place] [phone]     For any questions, please reach out to the Endocrinology Clinic Number for assistance: [phone].     CALCIUM Recommendation 1200 mg/day in divided dose of no more than 500 mg/dose. This includes what is in your food and also what is in any supplements you are taking.      Dietary sources of calcium: These also contain vitamin D  Milk / buttermilk              8 oz            300 mg  Dry milk powder       5 Tbsp             300 mg  Yogurt                          1 cup           400 mg  Ice cream                    1/2 cup          100 mg  Hard cheese                     1.5 oz          300 mg  Cottage cheese                1/2 cup        65 mg  Spinach, cooked          1 cup              240 mg  Tofu, soft regular           4 oz          120 to 390 mg  Sardines                       3 oz               370 mg  Mackerel canned         3 oz                250 mg  Canned salmon with bones 3 oz        170-210 mg  Calcium fortified cereals are available  SILK soy and almond milk products are calcium fortified  Orange juice  Fortified with Calcium       8 oz            300 mg  Low fat dairy sources are recommended      Recommended exercise goals:    30 minutes of moderate exercise on most days of the week .  Weight bearing exercise (ie, walking, jogging, hiking, dancing)    Strength training 2 or more times/week in addition to other weight -being exercise.  Strength training -- uses weight or resistance beyond that seen in everyday activities -(pilates, weight training with free weights, weight machines or resistance bands)    VDO exercise that you can follow     https://www.[employer].com/c/melioguide     https://[employer].com/      https://www.bonehealthandosteoporosis.org/patients/treatment/exercisesafe-movement/osteoporosis-exercise-for-strong-bones/     https://www.bones.nih.gov/health-info/bone/bone-health/exercise/exercise-your-bone-health#b    Welcome to the [facility] Endocrinology and Diabetes Clinics     Our Endocrinology Clinics are here to provide you with a team-based, collaborative approach in the diagnosis and treatment of patients with diabetes and endocrine disorders. The team is made up of Physicians, Physician Assistants, Certified Diabetes Educators, Registered Nurses, Medical Assistants, Emergency Medical Technicians, and many others, all of whom have the unified goal of providing our patients with high quality care.     Please see below for some helpful tips to best navigate and use the [facility] Endocrinology clinic:     [place] Respect: At [facility], we are committed to a respectful and safe space for all patients, visitors, and staff.  We believe that mutual respect between patients and their care team is the foundation of quality care.  It is our expectation that you will be treated with respect by your care team.  In turn, we ask that all communication with the care team (written and verbal) be respectful and free from profanity, threatening, or abusive language.  Disrespectful communication undermines our therapeutic relationship with you and may result in us being unable to continue to provide your care.    Refills: A provider must see you at least annually to prescribe and refill medications. This is to ensure your safety as well as meet insurance and compliance regulations.    Scheduling: Many of our Providers offer both in-person or video visits. Please call to schedule any needed follow ups as soon as possible because our provider schedules fill up very quickly. Our care team has the right to require an in-person visit when they believe that it is medically necessary. Please remember  that for any virtual visits, you must be in the state [facility] at the time of the visit, otherwise we are unable to see you and you will need to be rescheduled.    Missed Appointments: If you need to cancel or miss your scheduled appointment, please call the clinic at [phone] to reschedule.  Please note if you repeatedly miss appointments or repeatedly miss appointments without calling to inform us ahead of time (no-show), the clinic may elect to not allow you to reschedule without speaking to a manager, may require a Partnership In Care Agreement prior to rescheduling, or could result in you no longer being able to receive care from the clinic. Providing the clinic with timely notification if you have to miss an appointment, allows us to better serve the needs of all of our patients.    Primary Care Provider: Our Endocrinologists are Specialists in their field. We expect you to have a Primary Care Provider established to handle any needs outside of your diabetes and endocrine care.  We would be happy to assist you find a Primary Care Provider, if you do not have one.    [employer]: [employer] is a wonderful resource that allows you access to your Care Team via online or the andrés. Please ask a member of the team if you would like help creating an account. Please note that it may take up to 2 business days for a response. [employer] messages are not reviewed on weekends or after business hours.  Emergent or urgent care needs should never be communicated via [employer].  If you experience a medical emergency call 911 or go to the nearest emergency room.    Labs: It is recommended that you stay within the [facility] for labs but you are welcome to obtain ordered labs (with some exceptions) from any location of your choice as long as they are able to complete and process the needed labs. If you need us to fax orders to your preferred lab, please provide us the name and fax number of the lab you would like to go  to so we can fax the orders. If your labs are drawn outside of the [facility], please have them fax the results to [phone] ([place]) or [phone] ([place]) or via [facility]. It is your responsibility to ensure that outside lab results are sent to us.    We look forward to working with you. Please do not hesitate to reach out with any questions.    Thank you,    The Endocrine Team    [facility] Address:   [place] Address:     [address] [place] [address]    Phone: [phone]  Fax: [phone]   [address] [place] [address]    Phone: [phone]  Fax: [phone]     [facility] [place] Cost Estimate Phone Number: [phone]    General Lab and Imaging Scheduling Phone Number: [phone]

## 2024-09-23 ENCOUNTER — LAB (OUTPATIENT)
Dept: INFUSION THERAPY | Facility: CLINIC | Age: 74
End: 2024-09-23
Attending: NURSE PRACTITIONER
Payer: COMMERCIAL

## 2024-09-23 ENCOUNTER — INFUSION THERAPY VISIT (OUTPATIENT)
Dept: INFUSION THERAPY | Facility: CLINIC | Age: 74
End: 2024-09-23
Attending: INTERNAL MEDICINE
Payer: COMMERCIAL

## 2024-09-23 VITALS
SYSTOLIC BLOOD PRESSURE: 118 MMHG | DIASTOLIC BLOOD PRESSURE: 69 MMHG | OXYGEN SATURATION: 100 % | HEART RATE: 65 BPM | BODY MASS INDEX: 18.93 KG/M2 | RESPIRATION RATE: 16 BRPM | WEIGHT: 120.6 LBS | HEIGHT: 67 IN | TEMPERATURE: 97.4 F

## 2024-09-23 DIAGNOSIS — M81.0 AGE-RELATED OSTEOPOROSIS WITHOUT CURRENT PATHOLOGICAL FRACTURE: ICD-10-CM

## 2024-09-23 DIAGNOSIS — K21.9 GASTROESOPHAGEAL REFLUX DISEASE, UNSPECIFIED WHETHER ESOPHAGITIS PRESENT: ICD-10-CM

## 2024-09-23 DIAGNOSIS — K21.9 GASTROESOPHAGEAL REFLUX DISEASE, UNSPECIFIED WHETHER ESOPHAGITIS PRESENT: Primary | ICD-10-CM

## 2024-09-23 DIAGNOSIS — M81.0 AGE-RELATED OSTEOPOROSIS WITHOUT CURRENT PATHOLOGICAL FRACTURE: Primary | ICD-10-CM

## 2024-09-23 LAB
ALBUMIN SERPL BCG-MCNC: 4.3 G/DL (ref 3.5–5.2)
CALCIUM SERPL-MCNC: 9.4 MG/DL (ref 8.8–10.4)
CREAT SERPL-MCNC: 0.87 MG/DL (ref 0.51–0.95)
EGFRCR SERPLBLD CKD-EPI 2021: 70 ML/MIN/1.73M2

## 2024-09-23 PROCEDURE — 82040 ASSAY OF SERUM ALBUMIN: CPT | Performed by: INTERNAL MEDICINE

## 2024-09-23 PROCEDURE — 82565 ASSAY OF CREATININE: CPT | Performed by: INTERNAL MEDICINE

## 2024-09-23 PROCEDURE — 82310 ASSAY OF CALCIUM: CPT | Performed by: INTERNAL MEDICINE

## 2024-09-23 PROCEDURE — 36415 COLL VENOUS BLD VENIPUNCTURE: CPT | Performed by: INTERNAL MEDICINE

## 2024-09-23 PROCEDURE — 96365 THER/PROPH/DIAG IV INF INIT: CPT

## 2024-09-23 PROCEDURE — 82306 VITAMIN D 25 HYDROXY: CPT | Performed by: INTERNAL MEDICINE

## 2024-09-23 PROCEDURE — 250N000011 HC RX IP 250 OP 636: Performed by: INTERNAL MEDICINE

## 2024-09-23 RX ORDER — ACETAMINOPHEN 325 MG/1
650 TABLET ORAL EVERY 8 HOURS PRN
Status: CANCELLED | OUTPATIENT
Start: 2025-09-23

## 2024-09-23 RX ORDER — MEPERIDINE HYDROCHLORIDE 25 MG/ML
25 INJECTION INTRAMUSCULAR; INTRAVENOUS; SUBCUTANEOUS EVERY 30 MIN PRN
Status: CANCELLED | OUTPATIENT
Start: 2025-09-23

## 2024-09-23 RX ORDER — ALBUTEROL SULFATE 90 UG/1
1-2 AEROSOL, METERED RESPIRATORY (INHALATION)
Start: 2025-09-23

## 2024-09-23 RX ORDER — EPINEPHRINE 1 MG/ML
0.3 INJECTION, SOLUTION INTRAMUSCULAR; SUBCUTANEOUS EVERY 5 MIN PRN
Status: CANCELLED | OUTPATIENT
Start: 2025-09-23

## 2024-09-23 RX ORDER — ALBUTEROL SULFATE 0.83 MG/ML
2.5 SOLUTION RESPIRATORY (INHALATION)
OUTPATIENT
Start: 2025-09-23

## 2024-09-23 RX ORDER — HEPARIN SODIUM (PORCINE) LOCK FLUSH IV SOLN 100 UNIT/ML 100 UNIT/ML
5 SOLUTION INTRAVENOUS
Status: CANCELLED | OUTPATIENT
Start: 2025-09-23

## 2024-09-23 RX ORDER — EPINEPHRINE 1 MG/ML
0.3 INJECTION, SOLUTION INTRAMUSCULAR; SUBCUTANEOUS EVERY 5 MIN PRN
OUTPATIENT
Start: 2025-09-23

## 2024-09-23 RX ORDER — ALBUTEROL SULFATE 0.83 MG/ML
2.5 SOLUTION RESPIRATORY (INHALATION)
Status: CANCELLED | OUTPATIENT
Start: 2025-09-23

## 2024-09-23 RX ORDER — METHYLPREDNISOLONE SODIUM SUCCINATE 125 MG/2ML
125 INJECTION, POWDER, LYOPHILIZED, FOR SOLUTION INTRAMUSCULAR; INTRAVENOUS
Status: CANCELLED
Start: 2025-09-23

## 2024-09-23 RX ORDER — METHYLPREDNISOLONE SODIUM SUCCINATE 125 MG/2ML
125 INJECTION, POWDER, LYOPHILIZED, FOR SOLUTION INTRAMUSCULAR; INTRAVENOUS
Start: 2025-09-23

## 2024-09-23 RX ORDER — MEPERIDINE HYDROCHLORIDE 25 MG/ML
25 INJECTION INTRAMUSCULAR; INTRAVENOUS; SUBCUTANEOUS EVERY 30 MIN PRN
OUTPATIENT
Start: 2025-09-23

## 2024-09-23 RX ORDER — HEPARIN SODIUM (PORCINE) LOCK FLUSH IV SOLN 100 UNIT/ML 100 UNIT/ML
5 SOLUTION INTRAVENOUS
OUTPATIENT
Start: 2025-09-23

## 2024-09-23 RX ORDER — ACETAMINOPHEN 325 MG/1
650 TABLET ORAL EVERY 8 HOURS PRN
OUTPATIENT
Start: 2025-09-23

## 2024-09-23 RX ORDER — HEPARIN SODIUM,PORCINE 10 UNIT/ML
5-20 VIAL (ML) INTRAVENOUS DAILY PRN
Status: CANCELLED | OUTPATIENT
Start: 2025-09-23

## 2024-09-23 RX ORDER — ALBUTEROL SULFATE 90 UG/1
1-2 AEROSOL, METERED RESPIRATORY (INHALATION)
Status: CANCELLED
Start: 2025-09-23

## 2024-09-23 RX ORDER — HEPARIN SODIUM,PORCINE 10 UNIT/ML
5-20 VIAL (ML) INTRAVENOUS DAILY PRN
OUTPATIENT
Start: 2025-09-23

## 2024-09-23 RX ORDER — ZOLEDRONIC ACID 5 MG/100ML
5 INJECTION, SOLUTION INTRAVENOUS ONCE
Start: 2025-09-23

## 2024-09-23 RX ORDER — DIPHENHYDRAMINE HYDROCHLORIDE 50 MG/ML
50 INJECTION INTRAMUSCULAR; INTRAVENOUS
Start: 2025-09-23

## 2024-09-23 RX ORDER — ZOLEDRONIC ACID 5 MG/100ML
5 INJECTION, SOLUTION INTRAVENOUS ONCE
Status: CANCELLED
Start: 2025-09-23

## 2024-09-23 RX ORDER — ZOLEDRONIC ACID 5 MG/100ML
5 INJECTION, SOLUTION INTRAVENOUS ONCE
Status: COMPLETED | OUTPATIENT
Start: 2024-09-23 | End: 2024-09-23

## 2024-09-23 RX ORDER — DIPHENHYDRAMINE HYDROCHLORIDE 50 MG/ML
50 INJECTION INTRAMUSCULAR; INTRAVENOUS
Status: CANCELLED
Start: 2025-09-23

## 2024-09-23 RX ADMIN — ZOLEDRONIC ACID 5 MG: 0.05 INJECTION, SOLUTION INTRAVENOUS at 13:58

## 2024-09-23 NOTE — PROGRESS NOTES
Medical Assistant Note:  Sarah العلي presents today for blood draw.    Patient seen by provider today: No.   present during visit today: Not Applicable.    Concerns: No Concerns.    Procedure:  Lab draw site: lac, Needle type: bf, Gauge: 23.    Post Assessment:  Labs drawn without difficulty: Yes.    Discharge Plan:  Departure Mode: Ambulatory.    Face to Face Time: 5 min.    Abby Edgar, CMA

## 2024-09-23 NOTE — PROGRESS NOTES
Infusion Nursing Note:  Sarah العلي presents today for Reclast.    Patient seen by provider today: No   present during visit today: Not Applicable.    Note: N/A.      Intravenous Access:  Peripheral IV placed.    Treatment Conditions:  Calcium 9.4  Creatinine 0.87  Albumin 4.3.      Post Infusion Assessment:  Patient tolerated infusion without incident.  Blood return noted pre and post infusion.  Site patent and intact, free from redness, edema or discomfort.  No evidence of extravasations.  Access discontinued per protocol.       Discharge Plan:   Patient declined prescription refills.  Discharge instructions reviewed with: Patient.  Patient verbalized understanding of discharge instructions and all questions answered.  AVS to patient via [employer].  Patient will return as scheduled for next appointment.   Patient discharged in stable condition accompanied by: self.  Departure Mode: Ambulatory.      Mireya Del Cid RN

## 2024-09-24 LAB — VIT D+METAB SERPL-MCNC: 33 NG/ML (ref 20–50)

## 2024-12-10 ENCOUNTER — OFFICE VISIT (OUTPATIENT)
Dept: OPHTHALMOLOGY | Facility: CLINIC | Age: 74
End: 2024-12-10
Payer: COMMERCIAL

## 2024-12-10 DIAGNOSIS — H25.813 COMBINED FORMS OF AGE-RELATED CATARACT OF BOTH EYES: ICD-10-CM

## 2024-12-10 DIAGNOSIS — H52.03 HYPEROPIA OF BOTH EYES: Primary | ICD-10-CM

## 2024-12-10 DIAGNOSIS — H52.4 PRESBYOPIA: ICD-10-CM

## 2024-12-10 PROCEDURE — 92004 COMPRE OPH EXAM NEW PT 1/>: CPT | Performed by: OPHTHALMOLOGY

## 2024-12-10 ASSESSMENT — VISUAL ACUITY
METHOD: SNELLEN - LINEAR
OS_SC+: -3
OS_SC: 20/20
OD_SC+: -2
OD_SC: 20/30

## 2024-12-10 ASSESSMENT — CONF VISUAL FIELD
OS_SUPERIOR_TEMPORAL_RESTRICTION: 0
OD_SUPERIOR_TEMPORAL_RESTRICTION: 0
OD_INFERIOR_NASAL_RESTRICTION: 0
OS_SUPERIOR_NASAL_RESTRICTION: 0
OD_SUPERIOR_NASAL_RESTRICTION: 0
OS_INFERIOR_NASAL_RESTRICTION: 0
METHOD: COUNTING FINGERS
OS_INFERIOR_TEMPORAL_RESTRICTION: 0
OD_NORMAL: 1
OS_NORMAL: 1
OD_INFERIOR_TEMPORAL_RESTRICTION: 0

## 2024-12-10 ASSESSMENT — SLIT LAMP EXAM - LIDS
COMMENTS: NORMAL
COMMENTS: NORMAL

## 2024-12-10 ASSESSMENT — TONOMETRY
IOP_METHOD: ICARE
OD_IOP_MMHG: 11
OS_IOP_MMHG: 11

## 2024-12-10 ASSESSMENT — REFRACTION_MANIFEST
OS_CYLINDER: SPHERE
OS_SPHERE: +1.25
OD_SPHERE: +1.50
OD_ADD: +2.50
OD_AXIS: 170
OS_ADD: +2.50
OD_CYLINDER: +0.25

## 2024-12-10 ASSESSMENT — EXTERNAL EXAM - RIGHT EYE: OD_EXAM: NORMAL

## 2024-12-10 ASSESSMENT — EXTERNAL EXAM - LEFT EYE: OS_EXAM: NORMAL

## 2024-12-11 ENCOUNTER — TELEPHONE (OUTPATIENT)
Dept: OPHTHALMOLOGY | Facility: CLINIC | Age: 74
End: 2024-12-11
Payer: COMMERCIAL

## 2024-12-11 NOTE — TELEPHONE ENCOUNTER
Left Voicemail (1st Attempt) for the patient to call back and schedule the following:    Appointment type: return  Provider: dr. knowles  Return date: 12/10/2025  Specialty phone number: [phone]   Additonal Notes: Return in about 1 year (around 12/10/2025) for Annual Visit-v/t/shravan/Nicky mcguire Complex   Orthopedics, Podiatry, Sports Medicine, Ent ,Eye , Audiology, Adult Endocrine & Diabetes, Nutrition & Medication Therapy Management Specialties   [facility]

## 2024-12-11 NOTE — PROGRESS NOTES
HPI       COMPREHENSIVE EYE EXAM    In both eyes.  Since onset it is stable.  Associated symptoms include Negative for eye pain, flashes and floaters.  Treatments tried include no treatments.             Comments    Sarah العلي is a(n) 74 year old female who presents for a comprehensive exam. Last eye exam was 3 year(s) ago. Since exam, vision is about the same. No lubricating drops. No flashes and floaters. No eye pain.     Lab Results       Component                Value               Date                       A1C                      5.3                 07/22/2024              Milind Marcuso COT 12:26 PM December 10, 2024   denies family history of ocular conditions   denies history of ocular surgeries             Last edited by Stalin Hdz MD on 12/10/2024 12:59 PM.         Review of systems for the eyes was negative other than the pertinent positives/negatives listed in the HPI.      Assessment & Plan    HPI:  Sarah العلي is a 74 year old female who presents for complete exam. Last exam was three years ago with Dr. Tilley. Her vision is at baseline. She has no redness, tearing, flashes or floaters. Wears OTC readers PRN      POHx: presbyopia  PMHx: denies  Current Medications:   Current Outpatient Medications   Medication Sig Dispense Refill    albuterol (PROAIR HFA/PROVENTIL HFA/VENTOLIN HFA) 108 (90 Base) MCG/ACT inhaler Inhale 2 puffs into the lungs every 6 hours as needed for shortness of breath, wheezing or cough 18 g 1    Cholecalciferol (VITAMIN D3) 1000 UNITS CAPS Take 1 capsule by mouth daily      fluocinonide (LIDEX) 0.05 % external gel Apply topically 2 times daily 15 g 3     Current Facility-Administered Medications   Medication Dose Route Frequency Provider Last Rate Last Admin    denosumab (PROLIA) injection 60 mg  60 mg Subcutaneous Once Evelyn Hightower MD PhD         FHx: denies family history of ocular conditions   PSHx: denies history of ocular surgeries       Current Eye  Medications:      Assessment & Plan:  (H52.03) Hyperopia of both eyes  (primary encounter diagnosis)  (H52.4) Presbyopia  Patient has minimal hyperopia that does not require treatment at this time.  Presbyopia is difficulty seeing up close and is treated with bifocals or over the counter reading glasses      (H25.813) Combined forms of age-related cataract of both eyes  Mild cataracts are present and may account for some of the patient's visual complaints. No treatment currently recommended. The patient will monitor for vision changes and contact us with any decrease in vision. Recheck next visit       Return in about 1 year (around 12/10/2025) for Annual Visit-v/t/d/MRx.        Stalin Hdz MD     Attending Physician Attestation:  Complete documentation of historical and exam elements from today's encounter can be found in the full encounter summary report (not reduplicated in this progress note).  I personally obtained the chief complaint(s) and history of present illness.  I confirmed and edited as necessary the review of systems, past medical/surgical history, family history, social history, and examination findings as documented by others; and I examined the patient myself.  I personally reviewed the relevant tests, images, and reports as documented above.  I formulated and edited as necessary the assessment and plan and discussed the findings and management plan with the patient and family. - Stalin Hdz MD

## 2025-03-30 DIAGNOSIS — M81.0 AGE-RELATED OSTEOPOROSIS WITHOUT CURRENT PATHOLOGICAL FRACTURE: Primary | ICD-10-CM

## 2025-04-03 ENCOUNTER — OFFICE VISIT (OUTPATIENT)
Dept: AUDIOLOGY | Facility: CLINIC | Age: 75
End: 2025-04-03
Payer: COMMERCIAL

## 2025-04-03 DIAGNOSIS — H90.3 SENSORINEURAL HEARING LOSS, BILATERAL: Primary | ICD-10-CM

## 2025-04-03 NOTE — PROGRESS NOTES
AUDIOLOGY REPORT    SUBJECTIVE:  Sarah العلي is a 74 year old female who was seen in the Audiology Clinic at the [facility] for audiologic evaluation, referred by self.  The patient has been seen previously in this clinic on 9/19/2019 for assessment and results indicated mild to moderate to mild sensorineural hearing loss in the left ear and mild to severe sensorineural hearing loss in the right ear, after sudden right decrease in Nov. 2018.  At the time of her previous test she noted right pulsatile tinnitus.     She is using hearing aids from [employer], but notices that her hearing is changing and the aids are no longer assisting her as they once did.  Today she also reports pulsatile tinnitus in the left ear.  She denies ear pain, dizziness.  They were unaccompanied today.       OBJECTIVE:  Otoscopic exam indicates ears are clear of cerumen bilaterally     Pure Tone Thresholds assessed using conventional audiometry with good  reliability from 250-8000 Hz bilaterally using insert earphones and circumaural headphones     RIGHT:  mild sloping to severe sensorineural hearing loss    LEFT:    mild sloping to moderate sensorineural hearing loss    Tympanogram:    RIGHT: shallow mobility which is consistent with results from 9/19/2019    LEFT:   shallow mobility which is consistent with results from 9/19/2019    Reflexes (reported by stimulus ear):  RIGHT: Ipsilateral is present at normal levels  RIGHT: Contralateral is absent at frequencies tested  LEFT:   Ipsilateral is present at normal levels  LEFT:   Contralateral is present at normal levels      Speech Reception Threshold:    RIGHT: 55 dB HL    LEFT:   55 dB HL  Word Recognition Score:     RIGHT: 40% at 85 dB HL using NU-6 recorded word list.    LEFT:   96% at 85 dB HL using NU-6 recorded word list.      ASSESSMENT:   Bilateral sensorineural hearing loss with slight decrease in thresholds in the high frequencies.  **Significant decrease  in word recognition in the right ear.      Today s results were discussed with the patient in detail.     PLAN:   It is recommended that the patient continue hearing aid use.  She was provided a copy of audiogram to take to [facility] for gain adjustments based on today's results.  ENT evaluation due to decreased word recognition is recommended.  She was assisted in making that appointment.  She may try only using the left aid in noise due to the poor word recognition in the right ear to see if she can hear better with only one aid.  All questions were fully answered.  Please call this clinic with questions regarding these results or recommendations.        You Bolivar.   Doctor of Audiology  License #[id number]

## 2025-04-08 ENCOUNTER — TELEPHONE (OUTPATIENT)
Dept: CARDIOLOGY | Facility: CLINIC | Age: 75
End: 2025-04-08
Payer: COMMERCIAL

## 2025-04-08 NOTE — TELEPHONE ENCOUNTER
Health Call Center    Phone Message    May a detailed message be left on voicemail: yes     Reason for Call: Other: pt was calling due to getting granddaughters graduation schedule and it conflicts with her June 9th appt for heart failure with [facility]. [place] after her appt and she will not be able to get there, travel wise, if she keeps the June 9th appts with us. The next available heart failure appt is not until October. Is there any way the team could possibly fit her in before October? Pt is aware this is not a for sure option and she may have to go with oct/wait list.      Action Taken: Other: cardiology     Travel Screening: Not Applicable      Thank you!  Specialty Access Center

## 2025-04-08 NOTE — TELEPHONE ENCOUNTER
Patient Contacted for the patient to call back and schedule the following:    Appointment type: RTN HF  Provider: ALEJANDRO  Return date: 7/7/2025  Specialty phone number: [phone] OPT 1  Additional appointment(s) needed: ECHO AND LABS PRIOR  Additonal Notes: RESCHEDULED PER PT REQUEST. APPT APPROVED BY LINDA DHILLON RN.

## 2025-04-12 ENCOUNTER — HEALTH MAINTENANCE LETTER (OUTPATIENT)
Age: 75
End: 2025-04-12

## 2025-04-28 ENCOUNTER — TELEPHONE (OUTPATIENT)
Dept: AUDIOLOGY | Facility: CLINIC | Age: 75
End: 2025-04-28
Payer: COMMERCIAL

## 2025-04-28 NOTE — TELEPHONE ENCOUNTER
[facility] Call Center    Phone Message    May a detailed message be left on voicemail: yes     Reason for Call: Other: Per pt she wants to know the brands of hearing aids that we sell before she schedules and appt with us. She had checked with her insurance and she know the names. Please call to discuss. Thank you     Action Taken: Message routed to:  [facility] ([facility]): AUDIO    Travel Screening: Not Applicable     Date of Service:

## 2025-04-30 NOTE — CONFIDENTIAL NOTE
Telephone call returned to Magdiel who was interested in learning more about the costs of hearing aids and hearing aid consultation. She was quoted $125 for hearing aid consultation and informed that hearing aids generally run between $3600.00-$6600.00. She will contact us if she decides to proceed with [facility].     All questions were fully answered.     You Bolivar.   Doctor of Audiology  License #[id number]

## 2025-06-10 ENCOUNTER — TELEPHONE (OUTPATIENT)
Dept: CARDIOLOGY | Facility: CLINIC | Age: 75
End: 2025-06-10
Payer: COMMERCIAL

## 2025-06-10 NOTE — TELEPHONE ENCOUNTER
[facility] Call Center    Phone Message    May a detailed message be left on voicemail: yes     Reason for Call: Other: Patient needs to re-schedule stress test for 25 and the order will  this day. Please extend order and contact patient to re-schedule appt.      Action Taken: Other: cardiology    Travel Screening: Not Applicable  Thank you!  Specialty Access Center       Date of Service:

## 2025-06-11 NOTE — TELEPHONE ENCOUNTER
Patient Contacted for the patient to call back and schedule the following:    Appointment type: cpx  Provider: ehsan  Return date: 6/25/2025  Specialty phone number: [phone] opt1  Additional appointment(s) needed: n/a  Additonal Notes: n/a

## 2025-06-16 ENCOUNTER — OFFICE VISIT (OUTPATIENT)
Dept: INTERNAL MEDICINE | Facility: CLINIC | Age: 75
End: 2025-06-16
Payer: COMMERCIAL

## 2025-06-16 VITALS
DIASTOLIC BLOOD PRESSURE: 68 MMHG | BODY MASS INDEX: 19.84 KG/M2 | OXYGEN SATURATION: 93 % | WEIGHT: 126.7 LBS | HEART RATE: 60 BPM | SYSTOLIC BLOOD PRESSURE: 128 MMHG

## 2025-06-16 DIAGNOSIS — H61.21 IMPACTED CERUMEN OF RIGHT EAR: Primary | ICD-10-CM

## 2025-06-16 DIAGNOSIS — H91.93 BILATERAL HEARING LOSS, UNSPECIFIED HEARING LOSS TYPE: ICD-10-CM

## 2025-06-16 PROCEDURE — 3074F SYST BP LT 130 MM HG: CPT | Performed by: PHYSICIAN ASSISTANT

## 2025-06-16 PROCEDURE — 69210 REMOVE IMPACTED EAR WAX UNI: CPT | Mod: RT | Performed by: PHYSICIAN ASSISTANT

## 2025-06-16 PROCEDURE — 3078F DIAST BP <80 MM HG: CPT | Performed by: PHYSICIAN ASSISTANT

## 2025-06-16 NOTE — PROGRESS NOTES
Assessment & Plan     Impacted cerumen of right ear  Only a very small piece of wax removed with currette today     Bilateral hearing loss, unspecified hearing loss type  - seeing audiology for ear aids         Patient has routine visit with PCP next month         En Veloz is a 74 year old, presenting for the following health issues:  Ear Problem    History of Present Illness       Reason for visit:  Cerumen right ear canal need irrigation or curette   She is taking medications regularly.                      Objective    /68   Pulse 60   Wt 57.5 kg (126 lb 11.2 oz)   SpO2 93%   BMI 19.84 kg/m    Body mass index is 19.84 kg/m .  Physical Exam   GENERAL: alert and no distress  HENT: normal cephalic/atraumatic, right ear: small piece of wax noted in the canal - removed with loop currette today , and left ear: normal: no effusions, no erythema, normal landmarks            Signed Electronically by: Haley Buck PA-C

## 2025-07-03 DIAGNOSIS — I50.30 HEART FAILURE WITH PRESERVED EJECTION FRACTION, UNSPECIFIED HF CHRONICITY (H): Primary | ICD-10-CM

## 2025-07-07 ENCOUNTER — OFFICE VISIT (OUTPATIENT)
Dept: CARDIOLOGY | Facility: CLINIC | Age: 75
End: 2025-07-07
Attending: INTERNAL MEDICINE
Payer: COMMERCIAL

## 2025-07-07 ENCOUNTER — LAB (OUTPATIENT)
Dept: LAB | Facility: CLINIC | Age: 75
End: 2025-07-07
Attending: INTERNAL MEDICINE
Payer: COMMERCIAL

## 2025-07-07 VITALS
DIASTOLIC BLOOD PRESSURE: 80 MMHG | WEIGHT: 125 LBS | BODY MASS INDEX: 19.58 KG/M2 | OXYGEN SATURATION: 100 % | SYSTOLIC BLOOD PRESSURE: 136 MMHG | HEART RATE: 72 BPM

## 2025-07-07 DIAGNOSIS — I50.30 HEART FAILURE WITH PRESERVED EJECTION FRACTION, UNSPECIFIED HF CHRONICITY (H): ICD-10-CM

## 2025-07-07 DIAGNOSIS — M81.0 AGE-RELATED OSTEOPOROSIS WITHOUT CURRENT PATHOLOGICAL FRACTURE: ICD-10-CM

## 2025-07-07 LAB
ALBUMIN SERPL BCG-MCNC: 4.5 G/DL (ref 3.5–5.2)
ANION GAP SERPL CALCULATED.3IONS-SCNC: 12 MMOL/L (ref 7–15)
BUN SERPL-MCNC: 15.2 MG/DL (ref 8–23)
CALCIUM SERPL-MCNC: 9.7 MG/DL (ref 8.8–10.4)
CALCIUM SERPL-MCNC: 9.7 MG/DL (ref 8.8–10.4)
CHLORIDE SERPL-SCNC: 101 MMOL/L (ref 98–107)
CREAT SERPL-MCNC: 0.78 MG/DL (ref 0.51–0.95)
EGFRCR SERPLBLD CKD-EPI 2021: 79 ML/MIN/1.73M2
ERYTHROCYTE [DISTWIDTH] IN BLOOD BY AUTOMATED COUNT: 12.4 % (ref 10–15)
GLUCOSE SERPL-MCNC: 100 MG/DL (ref 70–99)
HCO3 SERPL-SCNC: 22 MMOL/L (ref 22–29)
HCT VFR BLD AUTO: 39.9 % (ref 35–47)
HGB BLD-MCNC: 13.4 G/DL (ref 11.7–15.7)
LVEF ECHO: NORMAL
MCH RBC QN AUTO: 30.2 PG (ref 26.5–33)
MCHC RBC AUTO-ENTMCNC: 33.6 G/DL (ref 31.5–36.5)
MCV RBC AUTO: 90 FL (ref 78–100)
NT-PROBNP SERPL-MCNC: 507 PG/ML (ref 0–353)
PLATELET # BLD AUTO: 257 10E3/UL (ref 150–450)
POTASSIUM SERPL-SCNC: 4.3 MMOL/L (ref 3.4–5.3)
RBC # BLD AUTO: 4.44 10E6/UL (ref 3.8–5.2)
SODIUM SERPL-SCNC: 135 MMOL/L (ref 135–145)
VIT D+METAB SERPL-MCNC: 35 NG/ML (ref 20–50)
WBC # BLD AUTO: 5.6 10E3/UL (ref 4–11)

## 2025-07-07 PROCEDURE — 82040 ASSAY OF SERUM ALBUMIN: CPT | Performed by: PATHOLOGY

## 2025-07-07 PROCEDURE — 99000 SPECIMEN HANDLING OFFICE-LAB: CPT | Performed by: PATHOLOGY

## 2025-07-07 PROCEDURE — G0463 HOSPITAL OUTPT CLINIC VISIT: HCPCS | Performed by: INTERNAL MEDICINE

## 2025-07-07 PROCEDURE — 83880 ASSAY OF NATRIURETIC PEPTIDE: CPT | Performed by: PATHOLOGY

## 2025-07-07 PROCEDURE — 85027 COMPLETE CBC AUTOMATED: CPT | Performed by: PATHOLOGY

## 2025-07-07 PROCEDURE — 80048 BASIC METABOLIC PNL TOTAL CA: CPT | Performed by: PATHOLOGY

## 2025-07-07 PROCEDURE — 82306 VITAMIN D 25 HYDROXY: CPT | Performed by: INTERNAL MEDICINE

## 2025-07-07 PROCEDURE — 36415 COLL VENOUS BLD VENIPUNCTURE: CPT | Performed by: PATHOLOGY

## 2025-07-07 RX ORDER — ZOLEDRONIC ACID 0.05 MG/ML
INJECTION, SOLUTION INTRAVENOUS
COMMUNITY

## 2025-07-07 ASSESSMENT — PAIN SCALES - GENERAL: PAINLEVEL_OUTOF10: NO PAIN (0)

## 2025-07-07 NOTE — NURSING NOTE
Chief Complaint   Patient presents with    Follow Up     RTN HF: 74 year old female presents with exercise induced HFpEF for 1 year follow-up with labs and echo prior     Vitals were taken and medications reconciled.    ANDERSON Gale  11:59 AM

## 2025-07-07 NOTE — PATIENT INSTRUCTIONS
Medication Changes & Instructions:  No changes      Follow up Appointment Information:  Follow-up in 1 year with echocardiogram, labs, exercise stress test prior      Thank you for allowing us to be a part of your care here at the [facility].      If you have questions or concerns please contact us at:  Cardiovascular Clinic  [facility]   [phone] Press #1 to send a message to your care team  On Call Cardiologist for after hours or on weekends: [phone]  option #4

## 2025-07-07 NOTE — PROGRESS NOTES
"July 7, 2025    Dear Dr. Queen,    We had the pleasure of seeing Ms. Sarah العلي for follow-up in her pulmonary hypertension clinic today.  As you know she is a 74-year-old female with Raynaud's disease, family history of scleroderma, HFpEF, and PAC's who returns today for follow-up.    She is doing overall well.  She and her  are walking 30 minutes a day.  She has not noticed any decrease in her exercise capacity.  No exertional chest pain or chest pressure.  No exertional presyncope or syncope.  No lower extremity swelling or abdominal distention.  No recent hospitalizations or ER visits.      PMH:  Past Medical History:   Diagnosis Date    Age-related osteoporosis without current pathological fracture 02/22/2019    Benign positional vertigo decades    Bilateral hearing loss, unspecified hearing loss type 12/06/2018    CARDIOVASCULAR SCREENING; LDL GOAL LESS THAN 160 07/17/2013    Cerebral aneurysm 12/2017    Erythema multiforme 09/26/2016    GERD (gastroesophageal reflux disease)     Hearing problem '04 & \"18    SNHL mild L ear;moderate-severe R ear  >75% loss word compre    Hematuria 09/26/2016    Overview:  Has had urologic evaluation    Laryngospasm 09/26/2016    Lichen planus     Migraine     with auora    Migraines 2000    Ocular migraine/ Migraine with Aura    Ocular migraine 09/26/2016    Oral lichen planus 09/26/2016    Osteopenia 2010    Osteopenia of left hip 12/12/2018    Physical exam 09/26/2016    Overview:  Full code.  Would want her  and son to make medical decisions for her if she were unable to do so.  Does not have an advanced directive.     Overview:  Diet - tries to eat a healthy diet  Exercise - \"I'm a fair weather walker\"  Active during the day Mammogram - 10/2017  Colonoscopy - 2/2008 - next in 2018  DXA - 8/2015 - next in 2018  Immunizations -  Up to date     Pulmonary hypertension (H)     Right internal carotid artery aneurysm 12/06/2018    5 mm    Sensorineural hearing " loss 2/04 & 4/18 2/18 worsened    Tinnitus 12/2018    R ear only    White coat syndrome without diagnosis of hypertension 12/06/2018     Current Medications    Current Outpatient Medications   Medication Sig Dispense Refill    albuterol (PROAIR HFA/PROVENTIL HFA/VENTOLIN HFA) 108 (90 Base) MCG/ACT inhaler Inhale 2 puffs into the lungs every 6 hours as needed for shortness of breath, wheezing or cough 18 g 1    Cholecalciferol (VITAMIN D3) 1000 UNITS CAPS Take 1 capsule by mouth daily      fluocinonide (LIDEX) 0.05 % external gel Apply topically 2 times daily 15 g 3    zoledronic acid (RECLAST) 5 MG/100ML infusion Inject into the vein.       Current Facility-Administered Medications   Medication Dose Route Frequency Provider Last Rate Last Admin    denosumab (PROLIA) injection 60 mg  60 mg Subcutaneous Once Evelyn Hightower MD PhD         Exam:  /80 (BP Location: Left arm, Patient Position: Chair, Cuff Size: Adult Regular)   Pulse 72   Wt 56.7 kg (125 lb)   SpO2 100%   BMI 19.58 kg/m    She was awake, alert, oriented x3.  She was comfortable.  She was in no apparent distress.  She had no pallor, cyanosis or jaundice.  Her neck exam revealed no jugular venous distention.  She had no lower extremity edema.  Cardiac auscultation revealed normal S1 and S2 with no murmur rub or gallop.  Auscultation of the lungs revealed equal air entry on both sides with no added sound.  Her abdomen was soft with normal also no tenderness no rigidity no guarding.  She had no focal neurological deficit.  Her extremities showed no edema.    Recent Results (from the past week)   Calcium    Collection Time: 07/07/25 10:10 AM   Result Value Ref Range    Calcium 9.7 8.8 - 10.4 mg/dL   Albumin level    Collection Time: 07/07/25 10:10 AM   Result Value Ref Range    Albumin 4.5 3.5 - 5.2 g/dL   CBC with platelets    Collection Time: 07/07/25 10:10 AM   Result Value Ref Range    WBC Count 5.6 4.0 - 11.0 10e3/uL    RBC Count 4.44  3.80 - 5.20 10e6/uL    Hemoglobin 13.4 11.7 - 15.7 g/dL    Hematocrit 39.9 35.0 - 47.0 %    MCV 90 78 - 100 fL    MCH 30.2 26.5 - 33.0 pg    MCHC 33.6 31.5 - 36.5 g/dL    RDW 12.4 10.0 - 15.0 %    Platelet Count 257 150 - 450 10e3/uL   Basic metabolic panel    Collection Time: 07/07/25 10:10 AM   Result Value Ref Range    Sodium 135 135 - 145 mmol/L    Potassium 4.3 3.4 - 5.3 mmol/L    Chloride 101 98 - 107 mmol/L    Carbon Dioxide (CO2) 22 22 - 29 mmol/L    Anion Gap 12 7 - 15 mmol/L    Urea Nitrogen 15.2 8.0 - 23.0 mg/dL    Creatinine 0.78 0.51 - 0.95 mg/dL    GFR Estimate 79 >60 mL/min/1.73m2    Calcium 9.7 8.8 - 10.4 mg/dL    Glucose 100 (H) 70 - 99 mg/dL   NT-proBNP    Collection Time: 07/07/25 10:10 AM   Result Value Ref Range    NT-proBNP 507 (H) 0 - 353 pg/mL   Echocardiogram Complete    Collection Time: 07/07/25 11:13 AM   Result Value Ref Range    LVEF  60-65%      Echocardiogram (07/2025)  Global and regional left ventricular function is normal with an EF of 60-65%. Left ventricular diastolic function is normal. Right ventricular function, chamber size, wall motion, and thickness are normal. Mild aortic insufficiency is present. Pulmonary artery systolic pressure is normal. The inferior vena cava is normal. No pericardial effusion is present. No significant changes noted.     CPEX (06/2024)  She exercised for 8 minutes and 11 seconds.  Her estimated METS was 5.7 METS.  She achieved anaerobic threshold with an RER of 1.09.  Her VO2 max was 19.8 mL/kg/min.   Her VO2 was normal at 87% predicted.  Her VE VCO2 slope was normal at 26.  She had appropriate blood pressure and heart rate response.  Her breathing reserve was normal at peak exercise.    Exercise RHC (11/2021)    She had exercise right heart catheterization with the following hemodynamics.    Baseline:  RA: 7/10/7  RV: 35/8  PCWP: 13/20/13  PA: 35/15/24  PA Sat: 77.2%  Hb: 12.4  HR: 72  Ao Sat: 100%  Basilio CO/CI: 4.9/2.9  TD CO/CI: 4.1/2.5  VO2:  187  PVR 2.2      Exercise (4 min 50 seconds):  RA: 8/14/8  PA: 54/35/42  PCWP: 35/51/35  PA Sat: 39.3%  Hb: 12.3  HR: 138  Ao Sat: 97%  Basilio CO/CI: 8.5/5.1  VO2: 821  PVR 0.8 CARLOS    Assessment and Plan:  In summary Ms. العلي is a very pleasant 74-year-old female with Raynaud's disease, a family history of scleroderma, and  exercise  induced heart failure with preserved ejection fraction who returns today for follow-up.     Exercise-induced heart failure with preserved ejection fraction     She is stable.  She is functional class II.  She has no evidence of decompensated heart failure.  She could not do a cardiopulmonary exercise testing this year due to hip pain.  However, her repeat echocardiogram shows normal right ventricular size and systolic function.  Her NT proBNP is mildly elevated but clinically she is euvolemic.    We discussed the importance of low-salt diet and fluid restriction.  We also discussed the importance of regular exercise. She didn't tolerate SGLT2-Inhibitors in the past.  Her blood pressure is adequately controlled.    Supraventricular arrhythmia -predominantly PACs    We discussed the potential option of starting beta-blockers however given the concern of fatigue she would like to hold off which I think is very reasonable.  Her SVT burden is very low.  She has symptoms mainly at nighttime.  We will continue to monitor this closely.  She is also wearing a digital watch to monitor for atrial fibrillation.    Pulmonary nodules  She follows up with the pulmonary nodule clinic at the [facility].    I have recommended her to return to clinic in a year with a repeat echocardiogram, cardiopulmonary stress testing, and labs.  She will call us in the interim with any further worsening symptoms. It was a pleasure seeing Ms. العلي in our bone hypertension clinic at [facility].    Total time today was 35 minutes reviewing notes, imaging, labs, patient visit, orders and documentation          Sincerely,  Negrita Rees MD   [facility]  Heart Failure, Transplant, and Mechanical Circulatory Support Cardiology   Cardiovascular Division  [facility]   [phone]

## 2025-07-07 NOTE — LETTER
"7/7/2025      RE: Sarah العلي  [address] [place] [address]       Dear Colleague,    Thank you for the opportunity to participate in the care of your patient, Sarah العلي, at the [facility] [place] at [facility]. Please see a copy of my visit note below.    July 7, 2025    Dear Dr. Queen,    We had the pleasure of seeing Ms. Sarah العلي for follow-up in her pulmonary hypertension clinic today.  As you know she is a 74-year-old female with Raynaud's disease, family history of scleroderma, HFpEF, and PAC's who returns today for follow-up.    She is doing overall well.  She and her  are walking 30 minutes a day.  She has not noticed any decrease in her exercise capacity.  No exertional chest pain or chest pressure.  No exertional presyncope or syncope.  No lower extremity swelling or abdominal distention.  No recent hospitalizations or ER visits.      PMH:  Past Medical History:   Diagnosis Date     Age-related osteoporosis without current pathological fracture 02/22/2019     Benign positional vertigo decades     Bilateral hearing loss, unspecified hearing loss type 12/06/2018     CARDIOVASCULAR SCREENING; LDL GOAL LESS THAN 160 07/17/2013     Cerebral aneurysm 12/2017     Erythema multiforme 09/26/2016     GERD (gastroesophageal reflux disease)      Hearing problem '04 & \"18    SNHL mild L ear;moderate-severe R ear  >75% loss word compre     Hematuria 09/26/2016    Overview:  Has had urologic evaluation     Laryngospasm 09/26/2016     Lichen planus      Migraine     with auora     Migraines 2000    Ocular migraine/ Migraine with Aura     Ocular migraine 09/26/2016     Oral lichen planus 09/26/2016     Osteopenia 2010     Osteopenia of left hip 12/12/2018     Physical exam 09/26/2016    Overview:  Full code.  Would want her  and son to make medical decisions for her if she were unable to do so.  Does not have an " "advanced directive.     Overview:  Diet - tries to eat a healthy diet  Exercise - \"I'm a fair weather walker\"  Active during the day Mammogram - 10/2017  Colonoscopy - 2/2008 - next in 2018  DXA - 8/2015 - next in 2018  Immunizations -  Up to date      Pulmonary hypertension (H)      Right internal carotid artery aneurysm 12/06/2018    5 mm     Sensorineural hearing loss 2/04 & 4/18 2/18 worsened     Tinnitus 12/2018    R ear only     White coat syndrome without diagnosis of hypertension 12/06/2018     Current Medications    Current Outpatient Medications   Medication Sig Dispense Refill     albuterol (PROAIR HFA/PROVENTIL HFA/VENTOLIN HFA) 108 (90 Base) MCG/ACT inhaler Inhale 2 puffs into the lungs every 6 hours as needed for shortness of breath, wheezing or cough 18 g 1     Cholecalciferol (VITAMIN D3) 1000 UNITS CAPS Take 1 capsule by mouth daily       fluocinonide (LIDEX) 0.05 % external gel Apply topically 2 times daily 15 g 3     zoledronic acid (RECLAST) 5 MG/100ML infusion Inject into the vein.       Current Facility-Administered Medications   Medication Dose Route Frequency Provider Last Rate Last Admin     denosumab (PROLIA) injection 60 mg  60 mg Subcutaneous Once Evelyn Hightower MD PhD         Exam:  /80 (BP Location: Left arm, Patient Position: Chair, Cuff Size: Adult Regular)   Pulse 72   Wt 56.7 kg (125 lb)   SpO2 100%   BMI 19.58 kg/m    She was awake, alert, oriented x3.  She was comfortable.  She was in no apparent distress.  She had no pallor, cyanosis or jaundice.  Her neck exam revealed no jugular venous distention.  She had no lower extremity edema.  Cardiac auscultation revealed normal S1 and S2 with no murmur rub or gallop.  Auscultation of the lungs revealed equal air entry on both sides with no added sound.  Her abdomen was soft with normal also no tenderness no rigidity no guarding.  She had no focal neurological deficit.  Her extremities showed no edema.    Recent " Results (from the past week)   Calcium    Collection Time: 07/07/25 10:10 AM   Result Value Ref Range    Calcium 9.7 8.8 - 10.4 mg/dL   Albumin level    Collection Time: 07/07/25 10:10 AM   Result Value Ref Range    Albumin 4.5 3.5 - 5.2 g/dL   CBC with platelets    Collection Time: 07/07/25 10:10 AM   Result Value Ref Range    WBC Count 5.6 4.0 - 11.0 10e3/uL    RBC Count 4.44 3.80 - 5.20 10e6/uL    Hemoglobin 13.4 11.7 - 15.7 g/dL    Hematocrit 39.9 35.0 - 47.0 %    MCV 90 78 - 100 fL    MCH 30.2 26.5 - 33.0 pg    MCHC 33.6 31.5 - 36.5 g/dL    RDW 12.4 10.0 - 15.0 %    Platelet Count 257 150 - 450 10e3/uL   Basic metabolic panel    Collection Time: 07/07/25 10:10 AM   Result Value Ref Range    Sodium 135 135 - 145 mmol/L    Potassium 4.3 3.4 - 5.3 mmol/L    Chloride 101 98 - 107 mmol/L    Carbon Dioxide (CO2) 22 22 - 29 mmol/L    Anion Gap 12 7 - 15 mmol/L    Urea Nitrogen 15.2 8.0 - 23.0 mg/dL    Creatinine 0.78 0.51 - 0.95 mg/dL    GFR Estimate 79 >60 mL/min/1.73m2    Calcium 9.7 8.8 - 10.4 mg/dL    Glucose 100 (H) 70 - 99 mg/dL   NT-proBNP    Collection Time: 07/07/25 10:10 AM   Result Value Ref Range    NT-proBNP 507 (H) 0 - 353 pg/mL   Echocardiogram Complete    Collection Time: 07/07/25 11:13 AM   Result Value Ref Range    LVEF  60-65%      Echocardiogram (07/2025)  Global and regional left ventricular function is normal with an EF of 60-65%. Left ventricular diastolic function is normal. Right ventricular function, chamber size, wall motion, and thickness are normal. Mild aortic insufficiency is present. Pulmonary artery systolic pressure is normal. The inferior vena cava is normal. No pericardial effusion is present. No significant changes noted.     CPEX (06/2024)  She exercised for 8 minutes and 11 seconds.  Her estimated METS was 5.7 METS.  She achieved anaerobic threshold with an RER of 1.09.  Her VO2 max was 19.8 mL/kg/min.   Her VO2 was normal at 87% predicted.  Her VE VCO2 slope was normal at 26.   She had appropriate blood pressure and heart rate response.  Her breathing reserve was normal at peak exercise.    Exercise RHC (11/2021)    She had exercise right heart catheterization with the following hemodynamics.    Baseline:  RA: 7/10/7  RV: 35/8  PCWP: 13/20/13  PA: 35/15/24  PA Sat: 77.2%  Hb: 12.4  HR: 72  Ao Sat: 100%  Basilio CO/CI: 4.9/2.9  TD CO/CI: 4.1/2.5  VO2: 187  PVR 2.2      Exercise (4 min 50 seconds):  RA: 8/14/8  PA: 54/35/42  PCWP: 35/51/35  PA Sat: 39.3%  Hb: 12.3  HR: 138  Ao Sat: 97%  Basilio CO/CI: 8.5/5.1  VO2: 821  PVR 0.8 CARLOS    Assessment and Plan:  In summary Ms. العلي is a very pleasant 74-year-old female with Raynaud's disease, a family history of scleroderma, and  exercise  induced heart failure with preserved ejection fraction who returns today for follow-up.     Exercise-induced heart failure with preserved ejection fraction     She is stable.  She is functional class II.  She has no evidence of decompensated heart failure.  She could not do a cardiopulmonary exercise testing this year due to hip pain.  However, her repeat echocardiogram shows normal right ventricular size and systolic function.  Her NT proBNP is mildly elevated but clinically she is euvolemic.    We discussed the importance of low-salt diet and fluid restriction.  We also discussed the importance of regular exercise. She didn't tolerate SGLT2-Inhibitors in the past.  Her blood pressure is adequately controlled.    Supraventricular arrhythmia -predominantly PACs    We discussed the potential option of starting beta-blockers however given the concern of fatigue she would like to hold off which I think is very reasonable.  Her SVT burden is very low.  She has symptoms mainly at nighttime.  We will continue to monitor this closely.  She is also wearing a digital watch to monitor for atrial fibrillation.    Pulmonary nodules  She follows up with the pulmonary nodule clinic at the [facility].    I have recommended her to  return to clinic in a year with a repeat echocardiogram, cardiopulmonary stress testing, and labs.  She will call us in the interim with any further worsening symptoms. It was a pleasure seeing Ms. العلي in our bone hypertension clinic at [facility].    Total time today was 35 minutes reviewing notes, imaging, labs, patient visit, orders and documentation         Sincerely,  Negrita Rees MD   [facility]ension  Heart Failure, Transplant, and Mechanical Circulatory Support Cardiology   Cardiovascular Division  [facility]   [phone]                          Please do not hesitate to contact me if you have any questions/concerns.     Sincerely,     Negrita Rees MD

## 2025-07-08 ENCOUNTER — PRE VISIT (OUTPATIENT)
Dept: INTERNAL MEDICINE | Facility: CLINIC | Age: 75
End: 2025-07-08
Payer: COMMERCIAL

## 2025-07-08 NOTE — TELEPHONE ENCOUNTER
Date of upcoming preventative visit: 7/18/25    Date of last preventative visit: 7/17/24    Relevant notes from last preventative visit:    Labs today and she will be getting Zoster vaccine at local pharmacy. Follow up in one year unless needed earlier.      (Z00.00) Physical exam  (primary encounter diagnosis)  Comment: Due for yearly labs, ordered as below.   Plan: TSH with free T4 reflex, Hepatic panel         (Albumin, ALT, AST, Bili, Alk Phos, TP)    Z13.6) CARDIOVASCULAR SCREENING; LDL GOAL LESS THAN 160  Comment: Due for lipid screening; pt is not fasting today.   Plan: Lipid panel reflex to direct LDL Non-fasting    (R73.09) Elevated glucose  Comment: Single measurement of elevated fasting glucose on BMP from June with glucose at 103. No h/o prediabetes or elevated fasting glucose previously, last A1c was 3/2023 and came back at 5.3. No recent polydipsia or polyuria per her hx, no concerning findings on exam including increase in weight, acanthosis nigricans, etc. However, given her age, known CV disease, will recheck A1c with annual labs and follow up as needed.   Plan: Hemoglobin A1c      Orders patient completed: Labs, Shingrix dose 2    Orders patient needs to complete: N/A    Vaccinations Due: Shingrix dose 3,     Actions needed for upcoming preventative visit: N/A    Medications due for Refill: N/A    Room Set up needed: N/A    ANDERSON Palacios 11:54 AM on 7/8/2025

## 2025-09-04 ENCOUNTER — ANCILLARY PROCEDURE (OUTPATIENT)
Dept: GENERAL RADIOLOGY | Facility: CLINIC | Age: 75
End: 2025-09-04
Attending: INTERNAL MEDICINE
Payer: COMMERCIAL

## 2025-09-04 ENCOUNTER — OFFICE VISIT (OUTPATIENT)
Dept: INTERNAL MEDICINE | Facility: CLINIC | Age: 75
End: 2025-09-04
Payer: COMMERCIAL

## 2025-09-04 VITALS
BODY MASS INDEX: 19.83 KG/M2 | SYSTOLIC BLOOD PRESSURE: 139 MMHG | HEART RATE: 60 BPM | OXYGEN SATURATION: 99 % | WEIGHT: 126.6 LBS | DIASTOLIC BLOOD PRESSURE: 71 MMHG | TEMPERATURE: 97.2 F

## 2025-09-04 DIAGNOSIS — M25.512 ACUTE PAIN OF LEFT SHOULDER: ICD-10-CM

## 2025-09-04 DIAGNOSIS — M25.512 ACUTE PAIN OF LEFT SHOULDER: Primary | ICD-10-CM

## (undated) DEVICE — INTRODUCER SHEATH 4FRX40CM MICROPUNC PED G47946

## (undated) DEVICE — INTRO SHEATH 7FRX10CM PINNACLE RSS702

## (undated) DEVICE — PACK HEART RIGHT CUSTOM SAN32RHF18

## (undated) DEVICE — Device

## (undated) DEVICE — INTRO SHEATH MICRO PLATINUM TIP 4FRX40CM 7274

## (undated) RX ORDER — FENTANYL CITRATE 50 UG/ML
INJECTION, SOLUTION INTRAMUSCULAR; INTRAVENOUS
Status: DISPENSED
Start: 2023-09-22

## (undated) RX ORDER — LIDOCAINE 40 MG/G
CREAM TOPICAL
Status: DISPENSED
Start: 2021-11-10